# Patient Record
Sex: MALE | Race: WHITE | Employment: FULL TIME | URBAN - METROPOLITAN AREA
[De-identification: names, ages, dates, MRNs, and addresses within clinical notes are randomized per-mention and may not be internally consistent; named-entity substitution may affect disease eponyms.]

---

## 2017-02-01 ENCOUNTER — VIRTUAL VISIT (OUTPATIENT)
Dept: FAMILY MEDICINE | Facility: OTHER | Age: 42
End: 2017-02-01

## 2017-02-01 NOTE — PROGRESS NOTES
"Date:   Clinician: Maritza Rapp  Clinician NPI: [id number]  Patient: Rickey Butt  Patient : 1975  Patient Address: [address] [place] [address]  Patient Phone: [phone]  Visit Protocol: URI  Patient Summary:  Rickey is a 41 year old ( : 1975 ) male who initiated a Zip for a presumed sinus infection. When asked the question \"Do you have a [place] primary care physician?\", Rickey responded \"Yes\".     His symptoms started gradually 48 hours ago and consist of rhinitis, chills, nasal congestion, itchy eyes, loss of appetite, post-nasal drainage, cough, myalgias, and malaise.   He denies dysphagia, nausea, vomiting, fever, sore throat, hoarse voice, ear pain, dyspnea, and chest pain. He denies a history of facial surgery.   His profuse nasal secretions are yellow. His moderate facial pain or pressure does not feel worse when bending or leaning forward and is located on both sides of his head. The facial pain or pressure started after the onset of other URI symptoms.  He has teeth pain and is confident the tooth pain is not from a cavity, recent dental work or other mouth problems. Rickey has a moderate headache. The headache did not start before his other symptoms and is located on both sides of his head.   In the past year Rickey has been diagnosed with one (1) episodes of sinusitis. When asked to feel his neck he reported enlarged lymph nodes. Rickey noted that the enlarged neck lymph nodes were first noticed when prompted to check for lymphadenopathy. He denies axillary lymphadenopathy.   His mild (a few coughs/hr) productive cough is NOT more bothersome at night. He believes the cough is caused by post-nasal drainage. His cough produces yellow sputum.   He denies rigors.   Rickey denies having COPD or other chronic lung disease.   Pulse: Not measured beats in 10 seconds.    Weight (in lbs): 200   Rickey does not smoke or use smokeless tobacco.   " MEDICATIONS:  Sertraline (Zoloft)  , ALLERGIES:  NKDA   Clinician Response:  Dear Rickey,  Based on the information you have provided, you likely have a viral upper respiratory infection, otherwise known as a 'cold'.   I am prescribing fluticasone propionate nasal (Flonase) 50 mcg/spray. Take one or two inhalations in each nostril one time a day. There are no refills with this prescription.   Unless your are allergic to the over-the-counter medication(s) below, I recommend using:   Ibuprofen. Please follow the instructions on the package. This is an over-the-counter medication that does not require a prescription.   Drink plenty of liquids, especially water and take time to rest your body. This may mean taking a nap or going to bed earlier. Your body is fighting an infection and liquids and rest will improve the pace of recovery. Remember to regularly wash your hands and avoid close contact with others to prevent spreading your infection.   Diagnosis: Viral URI  Diagnosis ICD: J06.9  Prescription: fluticasone propionate (Flonase) 50mcg nasal spray 16 gm, 30 days supply. Take one or two inhalations in each nostril one time a day. Refills: 0, Refill as needed: no, Allow substitutions: yes  Prescription Sent At: February 01 15:07:39, 2017  Pharmacy: [id number] - [phone] - [address] [place] [address]

## 2017-05-14 ENCOUNTER — OFFICE VISIT (OUTPATIENT)
Dept: URGENT CARE | Facility: URGENT CARE | Age: 42
End: 2017-05-14
Payer: COMMERCIAL

## 2017-05-14 VITALS
TEMPERATURE: 98.5 F | HEART RATE: 66 BPM | SYSTOLIC BLOOD PRESSURE: 110 MMHG | OXYGEN SATURATION: 97 % | DIASTOLIC BLOOD PRESSURE: 80 MMHG

## 2017-05-14 DIAGNOSIS — R07.0 THROAT PAIN: Primary | ICD-10-CM

## 2017-05-14 DIAGNOSIS — J30.2 SEASONAL ALLERGIC RHINITIS, UNSPECIFIED ALLERGIC RHINITIS TRIGGER: ICD-10-CM

## 2017-05-14 LAB
DEPRECATED S PYO AG THROAT QL EIA: NORMAL
MICRO REPORT STATUS: NORMAL
SPECIMEN SOURCE: NORMAL

## 2017-05-14 PROCEDURE — 87880 STREP A ASSAY W/OPTIC: CPT | Performed by: FAMILY MEDICINE

## 2017-05-14 PROCEDURE — 99213 OFFICE O/P EST LOW 20 MIN: CPT | Performed by: FAMILY MEDICINE

## 2017-05-14 PROCEDURE — 87081 CULTURE SCREEN ONLY: CPT | Performed by: FAMILY MEDICINE

## 2017-05-14 RX ORDER — CETIRIZINE HYDROCHLORIDE 10 MG/1
10 TABLET ORAL PRN
COMMUNITY
End: 2020-05-20

## 2017-05-14 RX ORDER — FLUTICASONE PROPIONATE 50 MCG
1-2 SPRAY, SUSPENSION (ML) NASAL DAILY
Qty: 1 BOTTLE | Refills: 0 | Status: SHIPPED | OUTPATIENT
Start: 2017-05-14

## 2017-05-14 NOTE — PROGRESS NOTES
SUBJECTIVE:   Rickey Butt is a 41 year old male presenting with a chief complaint of strep exposure and sore throat:  Symptoms started 1 week ago and  include: sore throat in the mornings and some post nasal drainage.  Has seasonal allergies, taking zyrtec.  Multiple family members with strep.  Course of illness is: same.    Treatment measures tried:  OTC meds.  Predisposing factors include:  Non smoker    ROS:  5-Point Review of Systems Negative-- Except as stated above.    OBJECTIVE  /80 (BP Location: Right arm, Patient Position: Chair, Cuff Size: Adult Large)  Pulse 66  Temp 98.5  F (36.9  C) (Oral)  SpO2 97%  GENERAL:  Awake, alert and interactive. No acute distress.  HEENT:   NC/AT, EOMI, clear conjunctiva.  Nose clear.  Oropharynx moist and clear.  TM's and EAC's benign.  NECK: supple and free of adenopathy  CHEST:  Lungs are clear, no rhonchi, wheezing or rales. Normal symmetric air entry throughout both lung fields.   HEART:  S1 and S2 normal, no murmurs, clicks, gallops or rubs. Regular rate and rhythm.    Results for orders placed or performed in visit on 05/14/17   Rapid strep screen   Result Value Ref Range    Specimen Description Throat     Rapid Strep A Screen       NEGATIVE: No Group A streptococcal antigen detected by immunoassay, await   culture report.      Micro Report Status FINAL 05/14/2017        ASSESSMENT/PLAN    ICD-10-CM    1. Throat pain R07.0 Rapid strep screen     Beta strep group A culture   2. Seasonal allergic rhinitis, unspecified allergic rhinitis trigger J30.2 fluticasone (FLONASE) 50 MCG/ACT spray      We discussed the expected course and symptomatic cares in detail, including return to care if symptoms not improving as expected, do not resolve completely, or if any new or worsening symptoms develop.

## 2017-05-15 LAB
BACTERIA SPEC CULT: NORMAL
MICRO REPORT STATUS: NORMAL
SPECIMEN SOURCE: NORMAL

## 2018-05-04 ENCOUNTER — OFFICE VISIT (OUTPATIENT)
Dept: FAMILY MEDICINE | Facility: CLINIC | Age: 43
End: 2018-05-04
Payer: COMMERCIAL

## 2018-05-04 VITALS
WEIGHT: 210 LBS | RESPIRATION RATE: 14 BRPM | HEART RATE: 73 BPM | BODY MASS INDEX: 27.83 KG/M2 | HEIGHT: 73 IN | SYSTOLIC BLOOD PRESSURE: 122 MMHG | DIASTOLIC BLOOD PRESSURE: 80 MMHG | TEMPERATURE: 98.2 F

## 2018-05-04 DIAGNOSIS — F41.1 GENERALIZED ANXIETY DISORDER: ICD-10-CM

## 2018-05-04 DIAGNOSIS — F32.0 MILD MAJOR DEPRESSION (H): ICD-10-CM

## 2018-05-04 DIAGNOSIS — J30.2 ACUTE SEASONAL ALLERGIC RHINITIS, UNSPECIFIED TRIGGER: Primary | ICD-10-CM

## 2018-05-04 PROCEDURE — 99214 OFFICE O/P EST MOD 30 MIN: CPT | Performed by: PHYSICIAN ASSISTANT

## 2018-05-04 RX ORDER — AZELASTINE 1 MG/ML
1 SPRAY, METERED NASAL 2 TIMES DAILY
Qty: 1 BOTTLE | Refills: 3 | Status: SHIPPED | OUTPATIENT
Start: 2018-05-04 | End: 2020-05-20

## 2018-05-04 ASSESSMENT — PATIENT HEALTH QUESTIONNAIRE - PHQ9: 5. POOR APPETITE OR OVEREATING: NOT AT ALL

## 2018-05-04 ASSESSMENT — ANXIETY QUESTIONNAIRES
5. BEING SO RESTLESS THAT IT IS HARD TO SIT STILL: NOT AT ALL
2. NOT BEING ABLE TO STOP OR CONTROL WORRYING: NOT AT ALL
IF YOU CHECKED OFF ANY PROBLEMS ON THIS QUESTIONNAIRE, HOW DIFFICULT HAVE THESE PROBLEMS MADE IT FOR YOU TO DO YOUR WORK, TAKE CARE OF THINGS AT HOME, OR GET ALONG WITH OTHER PEOPLE: NOT DIFFICULT AT ALL
GAD7 TOTAL SCORE: 1
3. WORRYING TOO MUCH ABOUT DIFFERENT THINGS: NOT AT ALL
1. FEELING NERVOUS, ANXIOUS, OR ON EDGE: NOT AT ALL
6. BECOMING EASILY ANNOYED OR IRRITABLE: SEVERAL DAYS
7. FEELING AFRAID AS IF SOMETHING AWFUL MIGHT HAPPEN: NOT AT ALL

## 2018-05-04 NOTE — PROGRESS NOTES
SUBJECTIVE:   Rickey Butt is a 42 year old male who presents to clinic today for the following health issues:      ALLERGIES     Onset: x 1 week    Description:   Nasal congestion: YES  Sneezing: YES  Red, itchy eyes: YES    Progression of Symptoms:  same    Accompanying Signs & Symptoms:  Cough: no   Wheezing: no   Rash: no   Sinus/facial pain: YES   History:   Is it seasonal: in the spring   History of Asthma: no   Has allergy testing been done: no     Precipitating factors:   None    Alleviating factors:  None       Therapies Tried and outcome: flonase, zyrtec      Depression and Anxiety Follow-Up    Status since last visit: No change    Other associated symptoms:None    Complicating factors:     Significant life event: No     Current substance abuse: None    PHQ-9 10/19/2015 12/7/2015   Total Score 2 3   Q9: Suicide Ideation Not at all Not at all     JENIFER-7 SCORE 11/24/2014 10/19/2015 12/7/2015   Total Score 3 - -   Total Score - - -   Total Score - 6 3     In the past two weeks have you had thoughts of suicide or self-harm?  No.    Do you have concerns about your personal safety or the safety of others?   No  PHQ-9  English  PHQ-9   Any Language  JENIFER-7  Suicide Assessment Five-step Evaluation and Treatment (SAFE-T)      Was managed by psychiatrist. They retired. Has ~4 months of refills left.     Problem list and histories reviewed & adjusted, as indicated.  Additional history: as documented    Patient Active Problem List   Diagnosis     CARDIOVASCULAR SCREENING; LDL GOAL LESS THAN 160     Family hx of colon cancer     Family hx of melanoma     Mild major depression (H)     Generalized anxiety disorder     Seasonal allergic rhinitis     Past Surgical History:   Procedure Laterality Date     APPENDECTOMY  2007     HERNIA REPAIR         Social History   Substance Use Topics     Smoking status: Never Smoker     Smokeless tobacco: Never Used     Alcohol use Yes      Comment: one bottle beer per week      "Family History   Problem Relation Age of Onset     CANCER Mother      melanoma         Current Outpatient Prescriptions   Medication Sig Dispense Refill     azelastine (ASTELIN) 0.1 % spray Spray 1 spray into both nostrils 2 times daily 1 Bottle 3     cetirizine (ZYRTEC) 10 MG tablet Take 10 mg by mouth daily       fluticasone (FLONASE) 50 MCG/ACT spray Spray 1-2 sprays into both nostrils daily 1 Bottle 0     sertraline (ZOLOFT) 25 MG tablet Take 25 mg by mouth daily Patient taking 1.5 tabs daily 30 tablet 0     No Known Allergies  Recent Labs   Lab Test  10/19/15   0804  07/18/13   0832   LDL   --   89   HDL   --   38*   TRIG   --   92   ALT   --   35   CR   --   0.86   GFRESTIMATED   --   >90   GFRESTBLACK   --   >90   POTASSIUM   --   4.2   TSH  0.86  0.63        Reviewed and updated as needed this visit by clinical staff  Tobacco  Allergies  Meds  Problems  Med Hx  Surg Hx  Fam Hx  Soc Hx        Reviewed and updated as needed this visit by Provider  Allergies  Meds  Problems         ROS:  Constitutional, HEENT, psych, cardiovascular, pulmonary, gi and gu systems are negative, except as otherwise noted.    OBJECTIVE:     /80 (BP Location: Right arm, Patient Position: Chair, Cuff Size: Adult Large)  Pulse 73  Temp 98.2  F (36.8  C) (Oral)  Resp 14  Ht 6' 1\" (1.854 m)  Wt 210 lb (95.3 kg)  BMI 27.71 kg/m2  Body mass index is 27.71 kg/(m^2).  GENERAL: healthy, alert and no distress  EYES: Eyes grossly normal to inspection, PERRL and conjunctivae and sclerae normal  HENT: normal cephalic/atraumatic, both ears: clear effusion, nasal mucosa edematous , oropharynx clear, oral mucous membranes moist and sinuses: maxillary tenderness on bilaterally  NECK: no adenopathy, no asymmetry, masses, or scars and thyroid normal to palpation  RESP: lungs clear to auscultation - no rales, rhonchi or wheezes  CV: regular rates and rhythm, normal S1 S2, no S3 or S4 and no murmur, click or rub  PSYCH: mentation " appears normal, affect normal/bright    Diagnostic Test Results:  none     ASSESSMENT/PLAN:     (J30.2) Acute seasonal allergic rhinitis, unspecified trigger  (primary encounter diagnosis)  Comment: history of this. Not managed with past treatments. Will add astelin to regimen. If no improvement in ~5 days, patient to call and singulair to be added.   Plan: azelastine (ASTELIN) 0.1 % spray        -Medication use and side effects discussed with the patient. Patient is in complete understanding and agreement with plan.       (F32.0) Mild major depression (H)  Comment: stable. Was followed by psych. No longer established. Given stability, I am comfortable continuing management. Does not yet need refill, but will refill in 4 months and see patient yearly.   Plan:     (F41.1) Generalized anxiety disorder  Comment: as above   Plan:     Follow up: as above     Eduardo Mckinnon PA-C  [facility]

## 2018-05-04 NOTE — MR AVS SNAPSHOT
"              After Visit Summary   5/4/2018    Rickey Butt    MRN: [id number]           Patient Information     Date Of Birth          1975        Visit Information        Provider Department      5/4/2018 7:00 AM Eduardo Mckinnon PA-C [facility]        Today's Diagnoses     Mild major depression (H)    -  1    Generalized anxiety disorder        Acute seasonal allergic rhinitis, unspecified trigger           Follow-ups after your visit        Who to contact     If you have questions or need follow up information about today's clinic visit or your schedule please contact [facility] directly at [phone].  Normal or non-critical lab and imaging results will be communicated to you by MyChart, letter or phone within 4 business days after the clinic has received the results. If you do not hear from us within 7 days, please contact the clinic through [employer] or phone. If you have a critical or abnormal lab result, we will notify you by phone as soon as possible.  Submit refill requests through [employer] or call your pharmacy and they will forward the refill request to us. Please allow 3 business days for your refill to be completed.          Additional Information About Your Visit        MyChart Information     [employer] gives you secure access to your electronic health record. If you see a primary care provider, you can also send messages to your care team and make appointments. If you have questions, please call your primary care clinic.  If you do not have a primary care provider, please call [phone] and they will assist you.        Care EveryWhere ID     This is your Care EveryWhere ID. This could be used by other organizations to access your [facility] medical records  [id number]        Your Vitals Were     Pulse Temperature Respirations Height BMI (Body Mass Index)       73 98.2  F (36.8  C) (Oral) 14 6' 1\" (1.854 m) 27.71 kg/m2        Blood Pressure from " Last 3 Encounters:   05/04/18 122/80   05/14/17 110/80   12/26/16 128/70    Weight from Last 3 Encounters:   05/04/18 210 lb (95.3 kg)   12/26/16 203 lb (92.1 kg)   03/31/16 200 lb (90.7 kg)              We Performed the Following     DEPRESSION ACTION PLAN (DAP)          Today's Medication Changes          These changes are accurate as of 5/4/18  7:20 AM.  If you have any questions, ask your nurse or doctor.               Start taking these medicines.        Dose/Directions    azelastine 0.1 % spray   Commonly known as:  ASTELIN   Used for:  Acute seasonal allergic rhinitis, unspecified trigger   Started by:  Eduardo Mckinnon PA-C        Dose:  1 spray   Spray 1 spray into both nostrils 2 times daily   Quantity:  1 Bottle   Refills:  3         These medicines have changed or have updated prescriptions.        Dose/Directions    fluticasone 50 MCG/ACT spray   Commonly known as:  FLONASE   This may have changed:  Another medication with the same name was removed. Continue taking this medication, and follow the directions you see here.   Used for:  Seasonal allergic rhinitis, unspecified allergic rhinitis trigger   Changed by:  Eduardo Mckinnon PA-C        Dose:  1-2 spray   Spray 1-2 sprays into both nostrils daily   Quantity:  1 Bottle   Refills:  0         Stop taking these medicines if you haven't already. Please contact your care team if you have questions.     albuterol 108 (90 Base) MCG/ACT Inhaler   Commonly known as:  PROAIR HFA/PROVENTIL HFA/VENTOLIN HFA   Stopped by:  Eduardo Mckinnon PA-C                Where to get your medicines      These medications were sent to [facility] [address] [place], [place] - [address]  [address], [facility] [address]     Phone:  [phone]     azelastine 0.1 % spray                Primary Care Provider Fax #    Physician No Ref-Primary [phone]       No address on file        Equal Access  to Services     PAZ ARAGNO : Cooper Ruano, wadanika barron, qamaris valeraalalexis bain. [facility] [phone].    ATENCIÓN: Si lorela vannesa, tiene a etienne disposición servicios gratuitos de asistencia lingüística. Llame al [phone].    We comply with applicable federal civil rights laws and [place] laws. We do not discriminate on the basis of race, color, national origin, age, disability, sex, sexual orientation, or gender identity.            Thank you!     Thank you for choosing [facility]  for your care. Our goal is always to provide you with excellent care. Hearing back from our patients is one way we can continue to improve our services. Please take a few minutes to complete the written survey that you may receive in the mail after your visit with us. Thank you!             Your Updated Medication List - Protect others around you: Learn how to safely use, store and throw away your medicines at www.disposemymeds.org.          This list is accurate as of 5/4/18  7:20 AM.  Always use your most recent med list.                   Brand Name Dispense Instructions for use Diagnosis    azelastine 0.1 % spray    ASTELIN    1 Bottle    Spray 1 spray into both nostrils 2 times daily    Acute seasonal allergic rhinitis, unspecified trigger       cetirizine 10 MG tablet    zyrTEC     Take 10 mg by mouth daily        fluticasone 50 MCG/ACT spray    FLONASE    1 Bottle    Spray 1-2 sprays into both nostrils daily    Seasonal allergic rhinitis, unspecified allergic rhinitis trigger       sertraline 25 MG tablet    ZOLOFT    30 tablet    Take 25 mg by mouth daily Patient taking 1.5 tabs daily

## 2018-05-04 NOTE — LETTER
My Depression Action Plan  Name: Rickey Butt   Date of Birth 1975  Date: 5/4/2018    My doctor: No Ref-Primary, Physician   My clinic: [address] [address] [phone]          GREEN    ZONE   Good Control    What it looks like:     Things are going generally well. You have normal up s and down s. You may even feel depressed from time to time, but bad moods usually last less than a day.   What you need to do:  1. Continue to care for yourself (see self care plan)  2. Check your depression survival kit and update it as needed  3. Follow your physician s recommendations including any medication.  4. Do not stop taking medication unless you consult with your physician first.           YELLOW         ZONE Getting Worse    What it looks like:     Depression is starting to interfere with your life.     It may be hard to get out of bed; you may be starting to isolate yourself from others.    Symptoms of depression are starting to last most all day and this has happened for several days.     You may have suicidal thoughts but they are not constant.   What you need to do:     1. Call your care team, your response to treatment will improve if you keep your care team informed of your progress. Yellow periods are signs an adjustment may need to be made.     2. Continue your self-care, even if you have to fake it!    3. Talk to someone in your support network    4. Open up your depression survival kit           RED    ZONE Medical Alert - Get Help    What it looks like:     Depression is seriously interfering with your life.     You may experience these or other symptoms: You can t get out of bed most days, can t work or engage in other necessary activities, you have trouble taking care of basic hygiene, or basic responsibilities, thoughts of suicide or death that will not go away, self-injurious behavior.     What you need to do:  1. Call  your care team and request a same-day appointment. If they are not available (weekends or after hours) call your local crisis line, emergency room or 911.            Depression Self Care Plan / Survival Kit    Self-Care for Depression  Here s the deal. Your body and mind are really not as separate as most people think.  What you do and think affects how you feel and how you feel influences what you do and think. This means if you do things that people who feel good do, it will help you feel better.  Sometimes this is all it takes.  There is also a place for medication and therapy depending on how severe your depression is, so be sure to consult with your medical provider and/ or Behavioral Health Consultant if your symptoms are worsening or not improving.     In order to better manage my stress, I will:    Exercise  Get some form of exercise, every day. This will help reduce pain and release endorphins, the  feel good  chemicals in your brain. This is almost as good as taking antidepressants!  This is not the same as joining a gym and then never going! (they count on that by the way ) It can be as simple as just going for a walk or doing some gardening, anything that will get you moving.      Hygiene   Maintain good hygiene (Get out of bed in the morning, Make your bed, Brush your teeth, Take a shower, and Get dressed like you were going to work, even if you are unemployed).  If your clothes don't fit try to get ones that do.    Diet  I will strive to eat foods that are good for me, drink plenty of water, and avoid excessive sugar, caffeine, alcohol, and other mood-altering substances.  Some foods that are helpful in depression are: complex carbohydrates, B vitamins, flaxseed, fish or fish oil, fresh fruits and vegetables.    Psychotherapy  I agree to participate in Individual Therapy (if recommended).    Medication  If prescribed medications, I agree to take them.  Missing doses can result in serious side effects.   I understand that drinking alcohol, or other illicit drug use, may cause potential side effects.  I will not stop my medication abruptly without first discussing it with my provider.    Staying Connected With Others  I will stay in touch with my friends, family members, and my primary care provider/team.    Use your imagination  Be creative.  We all have a creative side; it doesn t matter if it s oil painting, sand castles, or mud pies! This will also kick up the endorphins.    Witness Beauty  (AKA stop and smell the roses) Take a look outside, even in mid-winter. Notice colors, textures. Watch the squirrels and birds.     Service to others  Be of service to others.  There is always someone else in need.  By helping others we can  get out of ourselves  and remember the really important things.  This also provides opportunities for practicing all the other parts of the program.    Humor  Laugh and be silly!  Adjust your TV habits for less news and crime-drama and more comedy.    Control your stress  Try breathing deep, massage therapy, biofeedback, and meditation. Find time to relax each day.     My support system    Clinic Contact:  Phone number:    Contact 1:  Phone number:    Contact 2:  Phone number:    Restorationist/:  Phone number:    Therapist:  Phone number:    [facility] crisis center:    Phone number:    Other community support:  Phone number:

## 2018-05-05 ASSESSMENT — PATIENT HEALTH QUESTIONNAIRE - PHQ9: SUM OF ALL RESPONSES TO PHQ QUESTIONS 1-9: 2

## 2018-05-05 ASSESSMENT — ANXIETY QUESTIONNAIRES: GAD7 TOTAL SCORE: 1

## 2018-05-06 ENCOUNTER — MYC MEDICAL ADVICE (OUTPATIENT)
Dept: FAMILY MEDICINE | Facility: CLINIC | Age: 43
End: 2018-05-06

## 2018-05-06 DIAGNOSIS — J30.1 ACUTE SEASONAL ALLERGIC RHINITIS DUE TO POLLEN: Primary | ICD-10-CM

## 2018-05-07 RX ORDER — MONTELUKAST SODIUM 10 MG/1
10 TABLET ORAL AT BEDTIME
Qty: 90 TABLET | Refills: 1 | Status: SHIPPED | OUTPATIENT
Start: 2018-05-07 | End: 2020-05-20

## 2018-05-07 NOTE — TELEPHONE ENCOUNTER
ZB:    Please see below. Per LOV note on 5/4/18:    (J30.2) Acute seasonal allergic rhinitis, unspecified trigger  (primary encounter diagnosis)  Comment: history of this. Not managed with past treatments. Will add astelin to regimen. If no improvement in ~5 days, patient to call and singulair to be added.   Plan: azelastine (ASTELIN) 0.1 % spray        -Medication use and side effects discussed with the patient. Patient is in complete understanding and agreement with plan.      Brianne Garrison RN -- [facility]

## 2018-08-30 DIAGNOSIS — F41.1 GENERALIZED ANXIETY DISORDER: ICD-10-CM

## 2018-08-30 DIAGNOSIS — F32.0 MILD MAJOR DEPRESSION (H): Primary | ICD-10-CM

## 2018-08-30 NOTE — TELEPHONE ENCOUNTER
"Requested Prescriptions   Pending Prescriptions Disp Refills     sertraline (ZOLOFT) 25 MG tablet 30 tablet 0     Sig: Take 1 tablet (25 mg) by mouth daily Patient taking 1.5 tabs daily    SSRIs Protocol Passed    8/30/2018  7:20 AM       Passed - Recent (12 mo) or future (30 days) visit within the authorizing provider's specialty    Patient had office visit in the last 12 months or has a visit in the next 30 days with authorizing provider or within the authorizing provider's specialty.  See \"Patient Info\" tab in inbasket, or \"Choose Columns\" in Meds & Orders section of the refill encounter.           Passed - Patient is age 18 or older        Last Written Prescription Date:  12/26/2016  Last Fill Quantity: 30,  # refills: 0   Last office visit: 5/4/2018 with prescribing provider:  César Mckinnon   Future Office Visit:      "

## 2018-09-01 NOTE — TELEPHONE ENCOUNTER
OV 5-4-18 (F32.0) Mild major depression (H)  Comment: stable. Was followed by psych. No longer established. Given stability, I am comfortable continuing management.   Does not yet need refill, but will refill in 4 months and see patient yearly.      PHQ-9 SCORE 10/19/2015 12/7/2015 5/4/2018   Total Score - - -   Total Score MyChart - - -   Total Score 2 3 2     JENIFER-7 SCORE 10/19/2015 12/7/2015 5/4/2018   Total Score - - -   Total Score - - -   Total Score 6 3 1       Routing refill request to provider for review/approval because:  New provider prescribing medication, rx states he takes 1.5 tabs (37.5 mg) do you want dose changed to that?    Consuelo Juarez RN, BS  Clinical Nurse Triage.

## 2018-09-02 RX ORDER — SERTRALINE HYDROCHLORIDE 25 MG/1
TABLET, FILM COATED ORAL
Qty: 135 TABLET | Refills: 0 | OUTPATIENT
Start: 2018-09-02

## 2018-09-03 RX ORDER — SERTRALINE HYDROCHLORIDE 25 MG/1
25 TABLET, FILM COATED ORAL DAILY
Qty: 135 TABLET | Refills: 1 | Status: SHIPPED | OUTPATIENT
Start: 2018-09-03 | End: 2019-02-27

## 2018-09-09 ENCOUNTER — OFFICE VISIT (OUTPATIENT)
Dept: URGENT CARE | Facility: URGENT CARE | Age: 43
End: 2018-09-09
Payer: COMMERCIAL

## 2018-09-09 VITALS
OXYGEN SATURATION: 98 % | HEART RATE: 68 BPM | TEMPERATURE: 97.2 F | DIASTOLIC BLOOD PRESSURE: 79 MMHG | SYSTOLIC BLOOD PRESSURE: 122 MMHG

## 2018-09-09 DIAGNOSIS — I49.9 IRREGULAR HEARTBEAT: Primary | ICD-10-CM

## 2018-09-09 PROCEDURE — 36415 COLL VENOUS BLD VENIPUNCTURE: CPT | Performed by: FAMILY MEDICINE

## 2018-09-09 PROCEDURE — 80048 BASIC METABOLIC PNL TOTAL CA: CPT | Performed by: FAMILY MEDICINE

## 2018-09-09 PROCEDURE — 99214 OFFICE O/P EST MOD 30 MIN: CPT | Performed by: FAMILY MEDICINE

## 2018-09-09 PROCEDURE — 93000 ELECTROCARDIOGRAM COMPLETE: CPT | Performed by: FAMILY MEDICINE

## 2018-09-09 ASSESSMENT — ENCOUNTER SYMPTOMS: PALPITATIONS: 1

## 2018-09-09 NOTE — MR AVS SNAPSHOT
After Visit Summary   9/9/2018    Rickey Butt    MRN: [id number]           Patient Information     Date Of Birth          1975        Visit Information        Provider Department      9/9/2018 4:20 PM Coy Echevarria MD [facility]        Today's Diagnoses     Irregular heartbeat    -  1       Follow-ups after your visit        Who to contact     If you have questions or need follow up information about today's clinic visit or your schedule please contact [facility] URG[facility] directly at [phone].  Normal or non-critical lab and imaging results will be communicated to you by [employer], letter or phone within 4 business days after the clinic has received the results. If you do not hear from us within 7 days, please contact the clinic through [employer] or phone. If you have a critical or abnormal lab result, we will notify you by phone as soon as possible.  Submit refill requests through [employer] or call your pharmacy and they will forward the refill request to us. Please allow 3 business days for your refill to be completed.          Additional Information About Your Visit        MyChart Information     [employer] gives you secure access to your electronic health record. If you see a primary care provider, you can also send messages to your care team and make appointments. If you have questions, please call your primary care clinic.  If you do not have a primary care provider, please call [phone] and they will assist you.        Care EveryWhere ID     This is your Care EveryWhere ID. This could be used by other organizations to access your [place] medical records  [id number]        Your Vitals Were     Pulse Temperature Pulse Oximetry             68 97.2  F (36.2  C) (Oral) 98%          Blood Pressure from Last 3 Encounters:   09/09/18 122/79   05/04/18 122/80   05/14/17 110/80    Weight from Last 3 Encounters:   05/04/18 210 lb (95.3 kg)   12/26/16 203 lb  (92.1 kg)   03/31/16 200 lb (90.7 kg)              We Performed the Following     Basic metabolic panel     EKG 12-lead complete w/read - Clinics        Primary Care Provider Office Phone # Fax #    Eduardo Dwain Mckinnon PA-C [phone] [address] [facility] [address]        Equal Access to Services     [facility]: Hadii aad ku hadasho Soomaali, waaxda luqadaha, qaybta kaalmada adeegyada, waxay idiin hayaan adeeg kharash la'aan . So [facility] [phone].    ATENCIÓN: Si habla español, tiene a etienne disposición servicios gratuitos de asistencia lingüística. Llame al [phone].    We comply with applicable federal civil rights laws and [place] laws. We do not discriminate on the basis of race, color, national origin, age, disability, sex, sexual orientation, or gender identity.            Thank you!     Thank you for choosing [facility] URGENT [facility]ARE  for your care. Our goal is always to provide you with excellent care. Hearing back from our patients is one way we can continue to improve our services. Please take a few minutes to complete the written survey that you may receive in the mail after your visit with us. Thank you!             Your Updated Medication List - Protect others around you: Learn how to safely use, store and throw away your medicines at www.disposemymeds.org.          This list is accurate as of 9/9/18  5:48 PM.  Always use your most recent med list.                   Brand Name Dispense Instructions for use Diagnosis    azelastine 0.1 % nasal spray    ASTELIN    1 Bottle    Spray 1 spray into both nostrils 2 times daily    Acute seasonal allergic rhinitis, unspecified trigger       cetirizine 10 MG tablet    zyrTEC     Take 10 mg by mouth daily        fluticasone 50 MCG/ACT spray    FLONASE    1 Bottle    Spray 1-2 sprays into both nostrils daily    Seasonal allergic rhinitis, unspecified allergic rhinitis trigger       montelukast 10 MG tablet    SINGULAIR     90 tablet    Take 1 tablet (10 mg) by mouth At Bedtime    Acute seasonal allergic rhinitis due to pollen       sertraline 25 MG tablet    ZOLOFT    135 tablet    Take 1 tablet (25 mg) by mouth daily Patient taking 1.5 tabs daily    Mild major depression (H), Generalized anxiety disorder

## 2018-09-09 NOTE — PROGRESS NOTES
SUBJECTIVE:   Rickey Butt is a 43 year old male presenting with a chief complaint of   Chief Complaint   Patient presents with     Urgent Care     Irregular Heart Beat     Feels heart is skipping started yesterday- Denies numbness or tingling on arms, no Cx pain   Acute onset of feeling of a skipped beat or an extra beat over the last several days.  Thinks he may be a little bit dehydrated  Has been walking his dog with no problems associated with increase in activity    He first noticed this when he was at rest.  It is not woken up at night      He does not have a family history of cardiovascular disease that he knows of  No personal history of cardiovascular disease      Not taking any medications other than antiallergy medications and has not been on those at this time.    He is an established patient of [employer].    Cardiac Event    Symptom Onset: 2 day(s) ago.  Timing of illness: sudden onset.  Current and Associated symptoms: irregular heart beats.  Character: dull.  Severity mild.  Location: left chest.  Radiation: none.  Associated Symptoms: none.  Exacerbated by: nothing.  Relieved by: nothing.  Cardiac risk factors: known cardiac disease.        Review of Systems   Cardiovascular: Positive for palpitations.   All other systems reviewed and are negative.      Past Medical History:   Diagnosis Date     CARDIOVASCULAR SCREENING; LDL GOAL LESS THAN 160 7/18/2013     Family hx of colon cancer 7/18/2013     Family hx of melanoma 7/18/2013     Generalised anxiety disorder 7/18/2013     Mild major depression (H) 7/18/2013     Family History   Problem Relation Age of Onset     Cancer Mother      melanoma     Current Outpatient Prescriptions   Medication Sig Dispense Refill     sertraline (ZOLOFT) 25 MG tablet Take 1 tablet (25 mg) by mouth daily Patient taking 1.5 tabs daily 135 tablet 1     azelastine (ASTELIN) 0.1 % spray Spray 1 spray into both nostrils 2 times daily 1 Bottle 3     cetirizine (ZYRTEC) 10  MG tablet Take 10 mg by mouth daily       fluticasone (FLONASE) 50 MCG/ACT spray Spray 1-2 sprays into both nostrils daily 1 Bottle 0     montelukast (SINGULAIR) 10 MG tablet Take 1 tablet (10 mg) by mouth At Bedtime 90 tablet 1     Social History   Substance Use Topics     Smoking status: Never Smoker     Smokeless tobacco: Never Used     Alcohol use Yes      Comment: one bottle beer per week       OBJECTIVE  /79 (BP Location: Right arm, Patient Position: Chair, Cuff Size: Adult Regular)  Pulse 68  Temp 97.2  F (36.2  C) (Oral)  SpO2 98%    Physical Exam   Constitutional: He is oriented to person, place, and time. He appears well-developed and well-nourished.   HENT:   Head: Normocephalic.   Eyes: EOM are normal. Pupils are equal, round, and reactive to light.   Neck: Normal range of motion. Neck supple.   Cardiovascular: Normal rate and regular rhythm.    Pulmonary/Chest: Effort normal.   Abdominal: Soft.   Musculoskeletal: Normal range of motion.   Neurological: He is alert and oriented to person, place, and time.   Skin: Skin is warm.   Psychiatric: He has a normal mood and affect.   Nursing note and vitals reviewed.      Labs:  No results found for this or any previous visit (from the past 24 hour(s)).    X-Ray was not done.  EKG EKG showed a sinus rhythm  ; Occasional ectopy that appeared to be ventricular.  Normal rate    ASSESSMENT:      ICD-10-CM    1. Irregular heartbeat I49.9 EKG 12-lead complete w/read - Clinics     Basic metabolic panel        Medical Decision Making:    Differential Diagnosis:  Ectopy, sinus rhythm, anxiety, allergy     Serious Comorbid Conditions:  Adult:  None    PLAN:    Cardiac: We will be checking his BMP.  I am concerned that he may have been dehydrated I am going to suggest that he use 32 ounces of Gatorade on a daily basis over the next 2-3 days .  In addition there is some ectopy that he has not noticed before I do not think that it serious and I think overall his  cardiovascular health appears normal however    Having said that I would probably still have him sent to cardiology either for consultation or for a stress echo I did suggest that he call his.    Primary care and my note would be on the chart with recommendations    Followup:    As above    There are no Patient Instructions on file for this visit.

## 2018-09-10 LAB
ANION GAP SERPL CALCULATED.3IONS-SCNC: 6 MMOL/L (ref 3–14)
BUN SERPL-MCNC: 12 MG/DL (ref 7–30)
CALCIUM SERPL-MCNC: 8.8 MG/DL (ref 8.5–10.1)
CHLORIDE SERPL-SCNC: 112 MMOL/L (ref 94–109)
CO2 SERPL-SCNC: 25 MMOL/L (ref 20–32)
CREAT SERPL-MCNC: 0.91 MG/DL (ref 0.66–1.25)
GFR SERPL CREATININE-BSD FRML MDRD: >90 ML/MIN/1.7M2
GLUCOSE SERPL-MCNC: 90 MG/DL (ref 70–99)
POTASSIUM SERPL-SCNC: 4.1 MMOL/L (ref 3.4–5.3)
SODIUM SERPL-SCNC: 143 MMOL/L (ref 133–144)

## 2018-09-11 ENCOUNTER — OFFICE VISIT (OUTPATIENT)
Dept: CARDIOLOGY | Facility: CLINIC | Age: 43
End: 2018-09-11
Payer: COMMERCIAL

## 2018-09-11 VITALS
WEIGHT: 207.6 LBS | HEART RATE: 59 BPM | BODY MASS INDEX: 27.51 KG/M2 | DIASTOLIC BLOOD PRESSURE: 87 MMHG | HEIGHT: 73 IN | SYSTOLIC BLOOD PRESSURE: 133 MMHG

## 2018-09-11 DIAGNOSIS — I49.3 PVC'S (PREMATURE VENTRICULAR CONTRACTIONS): ICD-10-CM

## 2018-09-11 DIAGNOSIS — R00.2 HEART PALPITATIONS: Primary | ICD-10-CM

## 2018-09-11 LAB
MAGNESIUM SERPL-MCNC: 2.4 MG/DL (ref 1.6–2.3)
TSH SERPL DL<=0.005 MIU/L-ACNC: 0.56 MU/L (ref 0.4–4)

## 2018-09-11 PROCEDURE — 83735 ASSAY OF MAGNESIUM: CPT | Performed by: INTERNAL MEDICINE

## 2018-09-11 PROCEDURE — 84443 ASSAY THYROID STIM HORMONE: CPT | Performed by: INTERNAL MEDICINE

## 2018-09-11 PROCEDURE — 99204 OFFICE O/P NEW MOD 45 MIN: CPT | Performed by: INTERNAL MEDICINE

## 2018-09-11 PROCEDURE — 36415 COLL VENOUS BLD VENIPUNCTURE: CPT | Performed by: INTERNAL MEDICINE

## 2018-09-11 NOTE — LETTER
9/11/2018    Eduardo Mckinnon PA-C  [address] [facility] [address]    RE: Rickey Butt       Dear Colleague,    I had the pleasure of seeing Rickey Butt in the [facility].    HPI and Plan:   See dictation    Orders Placed This Encounter   Procedures     TSH     Magnesium     Follow-Up with Cardiac Advanced Practice Provider     Holter Monitor 24 hour - Adult     Echocardiogram     Exercise Stress Echocardiogram       No orders of the defined types were placed in this encounter.      There are no discontinued medications.      Encounter Diagnoses   Name Primary?     Heart palpitations Yes     PVC's (premature ventricular contractions)        CURRENT MEDICATIONS:  Current Outpatient Prescriptions   Medication Sig Dispense Refill     azelastine (ASTELIN) 0.1 % spray Spray 1 spray into both nostrils 2 times daily (Patient taking differently: Spray 1 spray into both nostrils as needed ) 1 Bottle 3     cetirizine (ZYRTEC) 10 MG tablet Take 10 mg by mouth as needed        fluticasone (FLONASE) 50 MCG/ACT spray Spray 1-2 sprays into both nostrils daily (Patient taking differently: Spray 1-2 sprays into both nostrils as needed ) 1 Bottle 0     montelukast (SINGULAIR) 10 MG tablet Take 1 tablet (10 mg) by mouth At Bedtime (Patient taking differently: Take 10 mg by mouth as needed ) 90 tablet 1     sertraline (ZOLOFT) 25 MG tablet Take 1 tablet (25 mg) by mouth daily Patient taking 1.5 tabs daily 135 tablet 1       ALLERGIES   No Known Allergies    PAST MEDICAL HISTORY:  Past Medical History:   Diagnosis Date     CARDIOVASCULAR SCREENING; LDL GOAL LESS THAN 160 7/18/2013     Family hx of colon cancer 7/18/2013     Family hx of melanoma 7/18/2013     Generalised anxiety disorder 7/18/2013     Heart palpitations      Mild major depression (H) 7/18/2013       PAST SURGICAL HISTORY:  Past Surgical History:   Procedure Laterality Date     APPENDECTOMY  2007     HERNIA REPAIR    "      FAMILY HISTORY:  Family History   Problem Relation Age of Onset     Cancer Mother      melanoma       SOCIAL HISTORY:  Social History     Social History     Marital status:      Spouse name: N/A     Number of children: N/A     Years of education: N/A     Social History Main Topics     Smoking status: Never Smoker     Smokeless tobacco: Never Used     Alcohol use Yes      Comment: one bottle beer per week     Drug use: No     Sexual activity: Yes     Partners: Female     Other Topics Concern     Parent/Sibling W/ Cabg, Mi Or Angioplasty Before 65f 55m? No     Social History Narrative       Review of Systems:  Skin:  Negative       Eyes:  Positive for glasses    ENT:  Negative      Respiratory:  Negative       Cardiovascular:    Positive for;palpitations    Gastroenterology: Negative      Genitourinary:  Negative      Musculoskeletal:  Negative      Neurologic:  Negative      Psychiatric:  Negative      Heme/Lymph/Imm:  Negative      Endocrine:  Negative        Physical Exam:  Vitals: /87  Pulse 59  Ht 1.854 m (6' 1\")  Wt 94.2 kg (207 lb 9.6 oz)  BMI 27.39 kg/m2    Constitutional:  cooperative, alert and oriented, well developed, well nourished, in no acute distress        Skin:  warm and dry to the touch, no apparent skin lesions or masses noted          Head:  normocephalic, no masses or lesions        Eyes:  pupils equal and round, conjunctivae and lids unremarkable, sclera white, no xanthalasma, EOMS intact, no nystagmus        Lymph:      ENT:  no pallor or cyanosis        Neck:  carotid pulses are full and equal bilaterally, JVP normal, no carotid bruit        Respiratory:  normal breath sounds, clear to auscultation, normal A-P diameter, normal symmetry, normal respiratory excursion, no use of accessory muscles         Cardiac: regular rhythm;normal S1 and S2;no S3 or S4;apical impulse not displaced;no murmurs, gallops or rubs detected occasional premature beats              pulses full " and equal                                        GI:  not assessed this visit        Extremities and Muscular Skeletal:  no deformities, clubbing, cyanosis, erythema observed;no edema              Neurological:  no gross motor deficits;affect appropriate        Psych:  Alert and Oriented x 3        CC  Eduardo Mckinnon PA-C  [address] [phone]                Thank you for allowing me to participate in the care of your patient.      Sincerely,     Ángel Amaro MD, MD     [facility]    cc:   Eduardo Mckinnon PA-C  [id number] [address] [phone]

## 2018-09-11 NOTE — LETTER
9/11/2018      Eduardo Mckinnon PA-C  [address]  [facility] [address]      RE: Rickey PRESSLEY Daquan       Dear Colleague,    I had the pleasure of seeing Rickey Butt in the [facility].    Service Date: 09/11/2018      REFERRING PROVIDER:  Eduardo Mckinnon       INDICATION FOR CARDIAC CONSULTATION:  Palpitations.        HISTORY OF PRESENT ILLNESS:  It is my pleasure to see your patient, Rickey Butt.  He is a very pleasant 43-year-old gentleman who noticed on Sunday which was 3 days ago that he began to notice extra heartbeats.  He would notice after the extra heartbeats, that there was a sort of emptiness in his chest which most likely corresponds to the pause after the extrasystole.  They were happening all day and he decided to go into the urgent care.  They performed an EKG and the extrasystoles are in fact PVCs.  He has a nonspecific intraventricular conduction defect in aVF and he has early repolarization changes on the precordial leads and to a lesser extent in I and aVL.  He has no chest pains or chest pressure or unusual shortness of breath.  His potassium at the time was normal at 4.1 but TSH and magnesium were not checked.  His TSH in 2015 was normal.  He does drink about 5 cups of coffee in the morning time and he did have 5 cups of coffee on that Sunday morning but that is no different than previously.  He has never had these palpitations up until this Sunday and he has had them since then.  He has not had an echocardiogram performed.  He has not noticed anything different over this weekend.  He did not take any new medications, either prescription or over-the-counter.  He is not particularly stressed out about anything.  He is sleeping well.  He has some minor stress with financing siding for his house but nothing acute or major.       IMPRESSION:  Palpitations.  These are due to PVCs.  No obvious underlying cause for the PVCs is identifiable at present.       PLAN:     1.  We will obtain an echocardiogram to ensure that the heart is structurally normal.   2.  We will obtain a stress echocardiogram to ensure that ischemia is not the cause of the PVCs.   3.  We will check a serum magnesium and TSH to ensure there is no electrolyte or hormonal causes for the PVCs.     4.  Finally, we will obtain a 24-hour Holter monitor to see the PVC burden.        It is my pleasure to be involved in the care of this very nice patient.      Ángel Guerrero MD, FACC       cc:   Eduardo Mckinnon PA-C    [address] [address]         ÁNGEL GUERRERO MD, FACC             D: 2018   T: 2018   MT: DORETHA      Name:     LEIGH COOL   MRN:      [id number]        Account:      [id number]   :      1975           Service Date: 2018      Document: [id number]           Outpatient Encounter Prescriptions as of 2018   Medication Sig Dispense Refill     azelastine (ASTELIN) 0.1 % spray Spray 1 spray into both nostrils 2 times daily (Patient taking differently: Spray 1 spray into both nostrils as needed ) 1 Bottle 3     cetirizine (ZYRTEC) 10 MG tablet Take 10 mg by mouth as needed        fluticasone (FLONASE) 50 MCG/ACT spray Spray 1-2 sprays into both nostrils daily (Patient taking differently: Spray 1-2 sprays into both nostrils as needed ) 1 Bottle 0     montelukast (SINGULAIR) 10 MG tablet Take 1 tablet (10 mg) by mouth At Bedtime (Patient taking differently: Take 10 mg by mouth as needed ) 90 tablet 1     sertraline (ZOLOFT) 25 MG tablet Take 1 tablet (25 mg) by mouth daily Patient taking 1.5 tabs daily 135 tablet 1     No facility-administered encounter medications on file as of 2018.        Again, thank you for allowing me to participate in the care of your patient.      Sincerely,    Ángel Amaro MD, MD     [facility]

## 2018-09-11 NOTE — PROGRESS NOTES
HPI and Plan:   See dictation    Orders Placed This Encounter   Procedures     TSH     Magnesium     Follow-Up with Cardiac Advanced Practice Provider     Holter Monitor 24 hour - Adult     Echocardiogram     Exercise Stress Echocardiogram       No orders of the defined types were placed in this encounter.      There are no discontinued medications.      Encounter Diagnoses   Name Primary?     Heart palpitations Yes     PVC's (premature ventricular contractions)        CURRENT MEDICATIONS:  Current Outpatient Prescriptions   Medication Sig Dispense Refill     azelastine (ASTELIN) 0.1 % spray Spray 1 spray into both nostrils 2 times daily (Patient taking differently: Spray 1 spray into both nostrils as needed ) 1 Bottle 3     cetirizine (ZYRTEC) 10 MG tablet Take 10 mg by mouth as needed        fluticasone (FLONASE) 50 MCG/ACT spray Spray 1-2 sprays into both nostrils daily (Patient taking differently: Spray 1-2 sprays into both nostrils as needed ) 1 Bottle 0     montelukast (SINGULAIR) 10 MG tablet Take 1 tablet (10 mg) by mouth At Bedtime (Patient taking differently: Take 10 mg by mouth as needed ) 90 tablet 1     sertraline (ZOLOFT) 25 MG tablet Take 1 tablet (25 mg) by mouth daily Patient taking 1.5 tabs daily 135 tablet 1       ALLERGIES   No Known Allergies    PAST MEDICAL HISTORY:  Past Medical History:   Diagnosis Date     CARDIOVASCULAR SCREENING; LDL GOAL LESS THAN 160 7/18/2013     Family hx of colon cancer 7/18/2013     Family hx of melanoma 7/18/2013     Generalised anxiety disorder 7/18/2013     Heart palpitations      Mild major depression (H) 7/18/2013       PAST SURGICAL HISTORY:  Past Surgical History:   Procedure Laterality Date     APPENDECTOMY  2007     HERNIA REPAIR         FAMILY HISTORY:  Family History   Problem Relation Age of Onset     Cancer Mother      melanoma       SOCIAL HISTORY:  Social History     Social History     Marital status:      Spouse name: N/A     Number of  "children: N/A     Years of education: N/A     Social History Main Topics     Smoking status: Never Smoker     Smokeless tobacco: Never Used     Alcohol use Yes      Comment: one bottle beer per week     Drug use: No     Sexual activity: Yes     Partners: Female     Other Topics Concern     Parent/Sibling W/ Cabg, Mi Or Angioplasty Before 65f 55m? No     Social History Narrative       Review of Systems:  Skin:  Negative       Eyes:  Positive for glasses    ENT:  Negative      Respiratory:  Negative       Cardiovascular:    Positive for;palpitations    Gastroenterology: Negative      Genitourinary:  Negative      Musculoskeletal:  Negative      Neurologic:  Negative      Psychiatric:  Negative      Heme/Lymph/Imm:  Negative      Endocrine:  Negative        Physical Exam:  Vitals: /87  Pulse 59  Ht 1.854 m (6' 1\")  Wt 94.2 kg (207 lb 9.6 oz)  BMI 27.39 kg/m2    Constitutional:  cooperative, alert and oriented, well developed, well nourished, in no acute distress        Skin:  warm and dry to the touch, no apparent skin lesions or masses noted          Head:  normocephalic, no masses or lesions        Eyes:  pupils equal and round, conjunctivae and lids unremarkable, sclera white, no xanthalasma, EOMS intact, no nystagmus        Lymph:      ENT:  no pallor or cyanosis        Neck:  carotid pulses are full and equal bilaterally, JVP normal, no carotid bruit        Respiratory:  normal breath sounds, clear to auscultation, normal A-P diameter, normal symmetry, normal respiratory excursion, no use of accessory muscles         Cardiac: regular rhythm;normal S1 and S2;no S3 or S4;apical impulse not displaced;no murmurs, gallops or rubs detected occasional premature beats              pulses full and equal                                        GI:  not assessed this visit        Extremities and Muscular Skeletal:  no deformities, clubbing, cyanosis, erythema observed;no edema              Neurological:  no gross " motor deficits;affect appropriate        Psych:  Alert and Oriented x 3        CC  Eduardo Mckinnon PA-C  [address] [place] [address]

## 2018-09-11 NOTE — PROGRESS NOTES
Service Date: 09/11/2018      REFERRING PROVIDER:  Eduardo Mckinnon       INDICATION FOR CARDIAC CONSULTATION:  Palpitations.        HISTORY OF PRESENT ILLNESS:  It is my pleasure to see your patient, Rickey Butt.  He is a very pleasant 43-year-old gentleman who noticed on Sunday which was 3 days ago that he began to notice extra heartbeats.  He would notice after the extra heartbeats, that there was a sort of emptiness in his chest which most likely corresponds to the pause after the extrasystole.  They were happening all day and he decided to go into the urgent care.  They performed an EKG and the extrasystoles are in fact PVCs.  He has a nonspecific intraventricular conduction defect in aVF and he has early repolarization changes on the precordial leads and to a lesser extent in I and aVL.  He has no chest pains or chest pressure or unusual shortness of breath.  His potassium at the time was normal at 4.1 but TSH and magnesium were not checked.  His TSH in 2015 was normal.  He does drink about 5 cups of coffee in the morning time and he did have 5 cups of coffee on that Sunday morning but that is no different than previously.  He has never had these palpitations up until this Sunday and he has had them since then.  He has not had an echocardiogram performed.  He has not noticed anything different over this weekend.  He did not take any new medications, either prescription or over-the-counter.  He is not particularly stressed out about anything.  He is sleeping well.  He has some minor stress with financing siding for his house but nothing acute or major.       IMPRESSION:  Palpitations.  These are due to PVCs.  No obvious underlying cause for the PVCs is identifiable at present.      PLAN:     1.  We will obtain an echocardiogram to ensure that the heart is structurally normal.   2.  We will obtain a stress echocardiogram to ensure that ischemia is not the cause of the PVCs.   3.  We will check a serum magnesium  and TSH to ensure there is no electrolyte or hormonal causes for the PVCs.     4.  Finally, we will obtain a 24-hour Holter monitor to see the PVC burden.        It is my pleasure to be involved in the care of this very nice patient.      Ángel Guerrero MD, FACC       cc:   Eduardo Mckinnon PA-C    [facility]   [id number] [place] [address]         ÁNGEL GUERRERO MD, FACC             D: 2018   T: 2018   MT: DORETHA      Name:     LEIGH COOL   MRN:      [id number]        Account:      [id number]   :      1975           Service Date: 2018      Document: [id number]

## 2018-09-11 NOTE — MR AVS SNAPSHOT
After Visit Summary   9/11/2018    Rickey Butt    MRN: [id number]           Patient Information     Date Of Birth          1975        Visit Information        Provider Department      9/11/2018 11:15 AM Ángel Amaro MD [facility]        Today's Diagnoses     Heart palpitations    -  1    PVC's (premature ventricular contractions)           Follow-ups after your visit        Additional Services     Follow-Up with Cardiac Advanced Practice Provider                 Your next 10 appointments already scheduled     Sep 12, 2018 11:00 AM CDT   Ech Complete with [id number]   [facility] CV Echocardiography (Cardiovascular Imaging at [facility])    [address] [place] [id number]   [phone]           1.  Please bring or wear a comfortable two-piece outfit. 2.  You may eat, drink and take your normal medicines. 3.  For any questions that cannot be answered, please contact the ordering physician 4.  Please do not wear perfumes or scented lotions on the day of your exam.            Sep 21, 2018 10:00 AM CDT   Ech Stress Test with [id number]   [facility] CV Echocardiography (Cardiovascular Imaging at [facility])    [address] [place] [address]   [phone]           1. Please bring or wear a comfortable two-piece outfit and walking shoes. 2. Stop eating 3 hours before the test. You may drink water or juice. 3. Stop all caffeine 12 hours before the test. This includes coffee, tea, soda pop, chocolate and certain medicines (such as Anacin and Excederin). Also avoid decaf coffee and tea, as these contain small amounts of caffeine. 4. No alcohol, smoking or use of other tobacco products for 12 hours before the test. 5. Refer to your provider instructions to see if you need to stop any medications (such as beta-blockers or nitrates) for this test. 6. For  patients with diabetes: - If you take insulin, call your diabetes care team. Ask if you should take a   dose the morning of your test. - If you take diabetes medicine by mouth, dont take it on the morning of your test. Bring it with you to take after the test. (If you have questions, call your diabetes care team) 7. When you arrive, please tell us if: - You have diabetes. - You have taken Viagra, Cialis or Levitra in the past 48 hours. 8. For any questions that cannot be answered, please contact the ordering physician 9. Please do not wear perfumes or scented lotions on the day of your exam.            Sep 21, 2018 11:30 AM CDT   Holter Monitor with [facility] Heart Imaging ([facility])    [address]  [place] [id number] [phone]              Future tests that were ordered for you today     Open Future Orders        Priority Expected Expires Ordered    Follow-Up with Cardiac Advanced Practice Provider Routine 10/11/2018 9/11/2019 9/11/2018    Holter Monitor 24 hour - Adult Routine 9/18/2018 9/11/2019 9/11/2018    Exercise Stress Echocardiogram Routine 9/18/2018 9/11/2019 9/11/2018    Echocardiogram Routine 9/18/2018 9/11/2019 9/11/2018    Magnesium Routine 9/11/2018 9/11/2019 9/11/2018    TSH Routine 9/11/2018 9/11/2019 9/11/2018            Who to contact     If you have questions or need follow up information about today's clinic visit or your schedule please contact [facility]   [place] directly at [phone].  Normal or non-critical lab and imaging results will be communicated to you by MyChart, letter or phone within 4 business days after the clinic has received the results. If you do not hear from us within 7 days, please contact the clinic through MyChart or phone. If you have a critical or abnormal lab result, we will notify you by phone as soon as possible.  Submit refill requests through [employer] or call  "your pharmacy and they will forward the refill request to us. Please allow 3 business days for your refill to be completed.          Additional Information About Your Visit        GoTuneshart Information     [employer] gives you secure access to your electronic health record. If you see a primary care provider, you can also send messages to your care team and make appointments. If you have questions, please call your primary care clinic.  If you do not have a primary care provider, please call [phone] and they will assist you.        Care EveryWhere ID     This is your Care EveryWhere ID. This could be used by other organizations to access your [place] medical records  [id number]        Your Vitals Were     Pulse Height BMI (Body Mass Index)             59 1.854 m (6' 1\") 27.39 kg/m2          Blood Pressure from Last 3 Encounters:   09/11/18 133/87   09/09/18 122/79   05/04/18 122/80    Weight from Last 3 Encounters:   09/11/18 94.2 kg (207 lb 9.6 oz)   05/04/18 95.3 kg (210 lb)   12/26/16 92.1 kg (203 lb)                 Today's Medication Changes          These changes are accurate as of 9/11/18 11:46 AM.  If you have any questions, ask your nurse or doctor.               These medicines have changed or have updated prescriptions.        Dose/Directions    azelastine 0.1 % nasal spray   Commonly known as:  ASTELIN   This may have changed:    - when to take this  - reasons to take this   Used for:  Acute seasonal allergic rhinitis, unspecified trigger        Dose:  1 spray   Spray 1 spray into both nostrils 2 times daily   Quantity:  1 Bottle   Refills:  3       fluticasone 50 MCG/ACT spray   Commonly known as:  FLONASE   This may have changed:    - when to take this  - reasons to take this   Used for:  Seasonal allergic rhinitis, unspecified allergic rhinitis trigger        Dose:  1-2 spray   Spray 1-2 sprays into both nostrils daily   Quantity:  1 Bottle   Refills:  0       montelukast 10 MG tablet   Commonly " known as:  TURNER   This may have changed:    - when to take this  - reasons to take this   Used for:  Acute seasonal allergic rhinitis due to pollen        Dose:  10 mg   Take 1 tablet (10 mg) by mouth At Bedtime   Quantity:  90 tablet   Refills:  1                Primary Care Provider Office Phone # Fax #    Eduardo Dwain Mckinnon PA-C [phone]       [address] [facility] [address]        Equal Access to Services     PAZ ARAGON : Hadii aad ku hadasho Soomaali, waaxda luqadaha, qaybta kaalmada adeegyada, waxay idiin hayaan adeeg kharashyam lacarlos . So [facility] [phone].    ATENCIÓN: Si niels granado, tiene a etienne disposición servicios gratuitos de asistencia lingüística. [facility] [phone].    We comply with applicable federal civil rights laws and [place] laws. We do not discriminate on the basis of race, color, national origin, age, disability, sex, sexual orientation, or gender identity.            Thank you!     Thank you for choosing [facility]  for your care. Our goal is always to provide you with excellent care. Hearing back from our patients is one way we can continue to improve our services. Please take a few minutes to complete the written survey that you may receive in the mail after your visit with us. Thank you!             Your Updated Medication List - Protect others around you: Learn how to safely use, store and throw away your medicines at www.disposemymeds.org.          This list is accurate as of 9/11/18 11:46 AM.  Always use your most recent med list.                   Brand Name Dispense Instructions for use Diagnosis    azelastine 0.1 % nasal spray    ASTELIN    1 Bottle    Spray 1 spray into both nostrils 2 times daily    Acute seasonal allergic rhinitis, unspecified trigger       cetirizine 10 MG tablet    zyrTEC     Take 10 mg by mouth as needed        fluticasone 50 MCG/ACT spray    FLONASE    1 Bottle    Spray 1-2 sprays  into both nostrils daily    Seasonal allergic rhinitis, unspecified allergic rhinitis trigger       montelukast 10 MG tablet    SINGULAIR    90 tablet    Take 1 tablet (10 mg) by mouth At Bedtime    Acute seasonal allergic rhinitis due to pollen       sertraline 25 MG tablet    ZOLOFT    135 tablet    Take 1 tablet (25 mg) by mouth daily Patient taking 1.5 tabs daily    Mild major depression (H), Generalized anxiety disorder

## 2018-09-12 ENCOUNTER — HOSPITAL ENCOUNTER (OUTPATIENT)
Dept: CARDIOLOGY | Facility: CLINIC | Age: 43
Discharge: HOME | End: 2018-09-12
Attending: INTERNAL MEDICINE | Admitting: INTERNAL MEDICINE
Payer: COMMERCIAL

## 2018-09-12 PROCEDURE — 93306 TTE W/DOPPLER COMPLETE: CPT

## 2018-09-12 PROCEDURE — 93306 TTE W/DOPPLER COMPLETE: CPT | Mod: 26 | Performed by: INTERNAL MEDICINE

## 2018-09-21 ENCOUNTER — HOSPITAL ENCOUNTER (OUTPATIENT)
Dept: CARDIOLOGY | Facility: CLINIC | Age: 43
End: 2018-09-21
Attending: INTERNAL MEDICINE
Payer: COMMERCIAL

## 2018-09-21 ENCOUNTER — HOSPITAL ENCOUNTER (OUTPATIENT)
Dept: CARDIOLOGY | Facility: CLINIC | Age: 43
Discharge: HOME | End: 2018-09-21
Attending: INTERNAL MEDICINE | Admitting: INTERNAL MEDICINE
Payer: COMMERCIAL

## 2018-09-21 ENCOUNTER — CARE COORDINATION (OUTPATIENT)
Dept: CARDIOLOGY | Facility: CLINIC | Age: 43
End: 2018-09-21

## 2018-09-21 DIAGNOSIS — I49.3 PVC'S (PREMATURE VENTRICULAR CONTRACTIONS): Primary | ICD-10-CM

## 2018-09-21 DIAGNOSIS — I49.3 PVC'S (PREMATURE VENTRICULAR CONTRACTIONS): ICD-10-CM

## 2018-09-21 DIAGNOSIS — I47.29 PAROXYSMAL VENTRICULAR TACHYCARDIA (H): ICD-10-CM

## 2018-09-21 PROCEDURE — 93226 XTRNL ECG REC<48 HR SCAN A/R: CPT

## 2018-09-21 PROCEDURE — 93325 DOPPLER ECHO COLOR FLOW MAPG: CPT | Mod: TC

## 2018-09-21 PROCEDURE — 93350 STRESS TTE ONLY: CPT | Mod: 26 | Performed by: INTERNAL MEDICINE

## 2018-09-21 PROCEDURE — 93227 XTRNL ECG REC<48 HR R&I: CPT | Performed by: INTERNAL MEDICINE

## 2018-09-21 PROCEDURE — 93321 DOPPLER ECHO F-UP/LMTD STD: CPT | Mod: 26 | Performed by: INTERNAL MEDICINE

## 2018-09-21 PROCEDURE — 25500064 ZZH RX 255 OP 636: Performed by: INTERNAL MEDICINE

## 2018-09-21 PROCEDURE — 93018 CV STRESS TEST I&R ONLY: CPT | Performed by: INTERNAL MEDICINE

## 2018-09-21 PROCEDURE — 93016 CV STRESS TEST SUPVJ ONLY: CPT | Performed by: INTERNAL MEDICINE

## 2018-09-21 PROCEDURE — 93325 DOPPLER ECHO COLOR FLOW MAPG: CPT | Mod: 26 | Performed by: INTERNAL MEDICINE

## 2018-09-21 RX ADMIN — HUMAN ALBUMIN MICROSPHERES AND PERFLUTREN 9 ML: 10; .22 INJECTION, SOLUTION INTRAVENOUS at 10:18

## 2018-09-21 NOTE — PROGRESS NOTES
Pt w/ hx of palpitations, PVCs. Seen by Dr. Meek Guerrero 9/11 and sent for echo, stress echo and labs, as below, all essential WNL. Holter is pending and pt has f/u with Nathaniel Alaniz CNP 10/25. Reviewed results below w/ pt and asked him to call prior to f/u w/ concerns/questions. FYI to Dr. FELIZ.    9/11/18 echo showed:  The visual ejection fraction is estimated at 60-65%.  The right ventricle is normal in size and function.  Sinus rhythm was noted.  There is no comparison study available.    9/21/18 stress echo showed:  1. Exercise echocardiogram is negative for myocardial ischemia at a peak heart  rate of 157 BPM (88% of maximal predicted heart rate).  2. No regional wall motion abnormalities.  3. Left ventricular ejection fraction increased from 65% to 75%.  4. Negative stress ECG.  5. Above-average exercise capacity (13.4 METS).     There is no comparison study available.    9/11/18 labs:   Component      Latest Ref Rng & Units 9/11/2018   TSH      0.40 - 4.00 mU/L 0.56   Magnesium      1.6 - 2.3 mg/dL 2.4 (H)     Huma Patel [facility] RN 3:32 PM 09/21/18  [phone]

## 2018-09-21 NOTE — PROGRESS NOTES
Will be seeing Nathaniel in 3 weeks. Steco normal. Small run of VT and PVCs. Would add metoprolol succinate 25 mg per day and will see if symptoms improve prior to visit. Fallon

## 2018-09-24 LAB — INTERPRETATION MONITOR -MUSE: NORMAL

## 2018-09-25 NOTE — PROGRESS NOTES
Reviewed Holter report showing       Pt has office visit 10/25/18 w/ Nathaniel Penny NP. Will message Dr. Amaro to review. LPenfield RN

## 2018-09-28 RX ORDER — METOPROLOL SUCCINATE 25 MG/1
25 TABLET, EXTENDED RELEASE ORAL DAILY
Qty: 30 TABLET | Refills: 2 | Status: SHIPPED | OUTPATIENT
Start: 2018-09-28 | End: 2018-10-30

## 2018-09-28 NOTE — PROGRESS NOTES
Called pt with recommendations from Dr. Amaro to start metoprolol succinate 25 mg daily and follow up with Nathaniel domingo has scheduled. Pt advised to take the metoprolol in the evening or at bedtime to help with fatigue. Prescription escripted to [facility] as requested. LPenfield RN

## 2018-10-30 ENCOUNTER — OFFICE VISIT (OUTPATIENT)
Dept: CARDIOLOGY | Facility: CLINIC | Age: 43
End: 2018-10-30
Attending: INTERNAL MEDICINE
Payer: COMMERCIAL

## 2018-10-30 VITALS
WEIGHT: 206 LBS | BODY MASS INDEX: 27.3 KG/M2 | DIASTOLIC BLOOD PRESSURE: 68 MMHG | SYSTOLIC BLOOD PRESSURE: 112 MMHG | HEIGHT: 73 IN | HEART RATE: 60 BPM

## 2018-10-30 DIAGNOSIS — I47.29 PAROXYSMAL VENTRICULAR TACHYCARDIA (H): ICD-10-CM

## 2018-10-30 DIAGNOSIS — I49.3 PVC'S (PREMATURE VENTRICULAR CONTRACTIONS): ICD-10-CM

## 2018-10-30 PROCEDURE — 99213 OFFICE O/P EST LOW 20 MIN: CPT | Performed by: NURSE PRACTITIONER

## 2018-10-30 RX ORDER — METOPROLOL SUCCINATE 25 MG/1
25 TABLET, EXTENDED RELEASE ORAL DAILY
Qty: 90 TABLET | Refills: 3 | Status: SHIPPED | OUTPATIENT
Start: 2018-10-30 | End: 2019-11-21

## 2018-10-30 NOTE — MR AVS SNAPSHOT
After Visit Summary   10/30/2018    Rickey Butt    MRN: [id number]           Patient Information     Date Of Birth          1975        Visit Information        Provider Department      10/30/2018 7:30 AM Nathaniel Penny APRN CNP [facility]        Today's Diagnoses     PVC's (premature ventricular contractions)        Paroxysmal ventricular tachycardia (H)          Care Instructions    Stay on metoprolol for now  See us back in follow up end of January/february to discuss plan for staying on metoprolol          Follow-ups after your visit        Additional Services     Follow-Up with Cardiologist                 Future tests that were ordered for you today     Open Future Orders        Priority Expected Expires Ordered    Follow-Up with Cardiologist Routine 2/1/2019 10/30/2019 10/30/2018            Who to contact     If you have questions or need follow up information about today's clinic visit or your schedule please contact [facility] directly at [phone].  Normal or non-critical lab and imaging results will be communicated to you by Places[employer]hart, letter or phone within 4 business days after the clinic has received the results. If you do not hear from us within 7 days, please contact the clinic through [employer] or phone. If you have a critical or abnormal lab result, we will notify you by phone as soon as possible.  Submit refill requests through [employer] or call your pharmacy and they will forward the refill request to us. Please allow 3 business days for your refill to be completed.          Additional Information About Your Visit        Placesterhart Information     [employer] gives you secure access to your electronic health record. If you see a primary care provider, you can also send messages to your care team and make appointments. If you have questions, please call your primary care clinic.  If you do not  "have a primary care provider, please call [phone] and they will assist you.        Care EveryWhere ID     This is your Care EveryWhere ID. This could be used by other organizations to access your [place] medical records  [id number]        Your Vitals Were     Pulse Height BMI (Body Mass Index)             60 1.854 m (6' 1\") 27.18 kg/m2          Blood Pressure from Last 3 Encounters:   10/30/18 112/68   09/11/18 133/87   09/09/18 122/79    Weight from Last 3 Encounters:   10/30/18 93.4 kg (206 lb)   09/11/18 94.2 kg (207 lb 9.6 oz)   05/04/18 95.3 kg (210 lb)              We Performed the Following     Follow-Up with Cardiac Advanced Practice Provider          Today's Medication Changes          These changes are accurate as of 10/30/18  7:55 AM.  If you have any questions, ask your nurse or doctor.               These medicines have changed or have updated prescriptions.        Dose/Directions    azelastine 0.1 % nasal spray   Commonly known as:  ASTELIN   This may have changed:    - when to take this  - reasons to take this   Used for:  Acute seasonal allergic rhinitis, unspecified trigger        Dose:  1 spray   Spray 1 spray into both nostrils 2 times daily   Quantity:  1 Bottle   Refills:  3       fluticasone 50 MCG/ACT spray   Commonly known as:  FLONASE   This may have changed:    - when to take this  - reasons to take this   Used for:  Seasonal allergic rhinitis, unspecified allergic rhinitis trigger        Dose:  1-2 spray   Spray 1-2 sprays into both nostrils daily   Quantity:  1 Bottle   Refills:  0       montelukast 10 MG tablet   Commonly known as:  SINGULAIR   This may have changed:    - when to take this  - reasons to take this   Used for:  Acute seasonal allergic rhinitis due to pollen        Dose:  10 mg   Take 1 tablet (10 mg) by mouth At Bedtime   Quantity:  90 tablet   Refills:  1            Where to get your medicines      These medications were sent to [id number] [place] " [place], [place] - [address]  [address], [facility] [address]     Phone:  [phone]     metoprolol succinate 25 MG 24 hr tablet                Primary Care Provider Office Phone # Fax #    Eduardo Dwain Mckinnon PA-C [phone]       [address]  [facility] [address]        Equal Access to Services     PAZ ARAGON : Hadii aad ku hadasho Soomaali, waaxda luqadaha, qaybta kaalmada adeegyada, waxay idiin hayaan adeeg kharash la'aan . So [facility] [phone].    ATENCIÓN: Si niels granado, tiene a etienne disposición servicios gratuitos de asistencia lingüística. [facility] [phone].    We comply with applicable federal civil rights laws and [place] laws. We do not discriminate on the basis of race, color, national origin, age, disability, sex, sexual orientation, or gender identity.            Thank you!     Thank you for choosing [facility]  for your care. Our goal is always to provide you with excellent care. Hearing back from our patients is one way we can continue to improve our services. Please take a few minutes to complete the written survey that you may receive in the mail after your visit with us. Thank you!             Your Updated Medication List - Protect others around you: Learn how to safely use, store and throw away your medicines at www.disposemymeds.org.          This list is accurate as of 10/30/18  7:55 AM.  Always use your most recent med list.                   Brand Name Dispense Instructions for use Diagnosis    azelastine 0.1 % nasal spray    ASTELIN    1 Bottle    Spray 1 spray into both nostrils 2 times daily    Acute seasonal allergic rhinitis, unspecified trigger       cetirizine 10 MG tablet    zyrTEC     Take 10 mg by mouth as needed        fluticasone 50 MCG/ACT spray    FLONASE    1 Bottle    Spray 1-2 sprays into both nostrils daily    Seasonal allergic rhinitis, unspecified  allergic rhinitis trigger       metoprolol succinate 25 MG 24 hr tablet    TOPROL-XL    90 tablet    Take 1 tablet (25 mg) by mouth daily At bedtime    PVC's (premature ventricular contractions), Paroxysmal ventricular tachycardia (H)       montelukast 10 MG tablet    SINGULAIR    90 tablet    Take 1 tablet (10 mg) by mouth At Bedtime    Acute seasonal allergic rhinitis due to pollen       sertraline 25 MG tablet    ZOLOFT    135 tablet    Take 1 tablet (25 mg) by mouth daily Patient taking 1.5 tabs daily    Mild major depression (H), Generalized anxiety disorder

## 2018-10-30 NOTE — PROGRESS NOTES
History of Present Illness:    Rickey Butt is a 43 year old male who recently met Dr. Mariano Guerrero.  He was noting extra heartbeats and palpitations which he felt has an emptiness in his chest at times.  EKG at urgent care confirmed PVCs.  He has a nonspecific intraventricular conduction defect and aVF and he has repolarization changes on the precordial leads and to some extent in lead I and aVL, although less.    He has no cardiac symptoms.    He underwent a battery of tests which she is here to review today.  Since he knows he is reduced his coffee intake from 5 cups in the morning to 1 cup.  He notes some fatigue with this.  He takes Zoloft for depression with some components of anxiety and has seasonal allergies.    He underwent an echocardiogram which is essentially normal.  Stress echocardiogram also shows no ischemia.  He is aerobically fit.  Holter monitor was performed showing a 6% PVC burden with one, 7 beat run of nonsustained ventricular tachycardia and occasional PACs.  TSH and magnesium levels were normal.    Following these results Dr. Meek Guerrero suggested the patient begin metoprolol XL 25 mg a day which she is taking in the evening.  He has had a marked improvement in his palpitations with this and his caffeine reduction.    All of his test results were reviewed.  He wishes to stay on the medication for a few months and then reassess longevity or use as needed .    Impression/Plan:     1.  Premature ventricular contractions  -Normal LV function  -No ischemia  -Normal magnesium and TSH  -No associated symptoms of syncope or presyncope  -tolerating Metoprolol XL 25 mg a day well with reduction in his PVC's  -He has limited his caffeine intake  -Follow-up with us in 4 months to determine length of metoprolol therapy or if this could be used on an as-needed basis in the future.    It has been a pleasure seeing Rickey Butt in follow up today.    Nathaniel Prakash-Katty, MSN, APRN-BC,  CNP  Cardiology    No orders of the defined types were placed in this encounter.    No orders of the defined types were placed in this encounter.    There are no discontinued medications.      Encounter Diagnosis   Name Primary?     PVC's (premature ventricular contractions)        CURRENT MEDICATIONS:  Current Outpatient Prescriptions   Medication Sig Dispense Refill     azelastine (ASTELIN) 0.1 % spray Spray 1 spray into both nostrils 2 times daily (Patient taking differently: Spray 1 spray into both nostrils as needed ) 1 Bottle 3     cetirizine (ZYRTEC) 10 MG tablet Take 10 mg by mouth as needed        fluticasone (FLONASE) 50 MCG/ACT spray Spray 1-2 sprays into both nostrils daily (Patient taking differently: Spray 1-2 sprays into both nostrils as needed ) 1 Bottle 0     metoprolol succinate (TOPROL-XL) 25 MG 24 hr tablet Take 1 tablet (25 mg) by mouth daily At bedtime 30 tablet 2     montelukast (SINGULAIR) 10 MG tablet Take 1 tablet (10 mg) by mouth At Bedtime (Patient taking differently: Take 10 mg by mouth as needed ) 90 tablet 1     sertraline (ZOLOFT) 25 MG tablet Take 1 tablet (25 mg) by mouth daily Patient taking 1.5 tabs daily 135 tablet 1       ALLERGIES   No Known Allergies    PAST MEDICAL HISTORY:  Past Medical History:   Diagnosis Date     CARDIOVASCULAR SCREENING; LDL GOAL LESS THAN 160 7/18/2013     Family hx of colon cancer 7/18/2013     Family hx of melanoma 7/18/2013     Generalised anxiety disorder 7/18/2013     Heart palpitations      Mild major depression (H) 7/18/2013       PAST SURGICAL HISTORY:  Past Surgical History:   Procedure Laterality Date     APPENDECTOMY  2007     HERNIA REPAIR         FAMILY HISTORY:  Family History   Problem Relation Age of Onset     Cancer Mother      melanoma       SOCIAL HISTORY:  Social History     Social History     Marital status:      Spouse name: N/A     Number of children: N/A     Years of education: N/A     Social History Main Topics      "Smoking status: Never Smoker     Smokeless tobacco: Never Used     Alcohol use Yes      Comment: one bottle beer per week     Drug use: No     Sexual activity: Yes     Partners: Female     Other Topics Concern     Parent/Sibling W/ Cabg, Mi Or Angioplasty Before 65f 55m? No     Social History Narrative       Review of Systems:  Skin:  Negative       Eyes:  Positive for glasses    ENT:  Negative      Respiratory:  Negative       Cardiovascular:    Positive for;palpitations (no longer aware of them)    Gastroenterology: Negative      Genitourinary:  Negative      Musculoskeletal:  Negative      Neurologic:  Negative      Psychiatric:  Negative      Heme/Lymph/Imm:  Negative      Endocrine:  Negative        Physical Exam:  Vitals: /68  Pulse 60  Ht 1.854 m (6' 1\")  Wt 93.4 kg (206 lb)  BMI 27.18 kg/m2    Constitutional:           Skin:             Head:           Eyes:           Lymph:      ENT:           Neck:           Respiratory:            Cardiac:                                                           GI:           Extremities and Muscular Skeletal:                 Neurological:           Psych:         Recent Lab Results:  LIPID RESULTS:  Lab Results   Component Value Date    CHOL 145 07/18/2013    HDL 38 (L) 07/18/2013    LDL 89 07/18/2013    TRIG 92 07/18/2013    CHOLHDLRATIO 3.8 07/18/2013       LIVER ENZYME RESULTS:  Lab Results   Component Value Date    AST 24 07/18/2013    ALT 35 07/18/2013       CBC RESULTS:  Lab Results   Component Value Date    WBC 3.4 (L) 07/18/2013    RBC 4.81 07/18/2013    HGB 14.9 07/18/2013    HCT 43.4 07/18/2013    MCV 90 07/18/2013    MCH 31.0 07/18/2013    MCHC 34.3 07/18/2013    RDW 12.8 07/18/2013     07/18/2013       BMP RESULTS:  Lab Results   Component Value Date     09/09/2018    POTASSIUM 4.1 09/09/2018    CHLORIDE 112 (H) 09/09/2018    CO2 25 09/09/2018    ANIONGAP 6 09/09/2018    GLC 90 09/09/2018    BUN 12 09/09/2018    CR 0.91 09/09/2018    " GFRESTIMATED >90 09/09/2018    GFRESTBLACK >90 09/09/2018    ANDREW 8.8 09/09/2018        A1C RESULTS:  No results found for: A1C    INR RESULTS:  No results found for: INR        CC  Ángel Amaro MD  [address]  [place] [address]

## 2018-10-30 NOTE — LETTER
10/30/2018    Eduardo Mckinnon PA-C  [address]  [facility] [address]    RE: Rickey Butt       Dear Colleague,    I had the pleasure of seeing Rickey Butt in the [facility].    History of Present Illness:    Rickey Butt is a 43 year old male who recently met Dr. Mariano Guerrero.  He was noting extra heartbeats and palpitations which he felt has an emptiness in his chest at times.  EKG at urgent care confirmed PVCs.  He has a nonspecific intraventricular conduction defect and aVF and he has repolarization changes on the precordial leads and to some extent in lead I and aVL, although less.    He has no cardiac symptoms.    He underwent a battery of tests which she is here to review today.  Since he knows he is reduced his coffee intake from 5 cups in the morning to 1 cup.  He notes some fatigue with this.  He takes Zoloft for depression with some components of anxiety and has seasonal allergies.    He underwent an echocardiogram which is essentially normal.  Stress echocardiogram also shows no ischemia.  He is aerobically fit.  Holter monitor was performed showing a 6% PVC burden with one, 7 beat run of nonsustained ventricular tachycardia and occasional PACs.  TSH and magnesium levels were normal.    Following these results Dr. Meek Guerrero suggested the patient begin metoprolol XL 25 mg a day which she is taking in the evening.  He has had a marked improvement in his palpitations with this and his caffeine reduction.    All of his test results were reviewed.  He wishes to stay on the medication for a few months and then reassess longevity or use as needed .    Impression/Plan:     1.  Premature ventricular contractions  -Normal LV function  -No ischemia  -Normal magnesium and TSH  -No associated symptoms of syncope or presyncope  -tolerating Metoprolol XL 25 mg a day well with reduction in his PVC's  -He has limited his caffeine intake  -Follow-up with us  in 4 months to determine length of metoprolol therapy or if this could be used on an as-needed basis in the future.    It has been a pleasure seeing Rickey Butt in follow up today.    Nathaniel Penny, MSN, APRN-BC, CNP  Cardiology    No orders of the defined types were placed in this encounter.    No orders of the defined types were placed in this encounter.    There are no discontinued medications.      Encounter Diagnosis   Name Primary?     PVC's (premature ventricular contractions)        CURRENT MEDICATIONS:  Current Outpatient Prescriptions   Medication Sig Dispense Refill     azelastine (ASTELIN) 0.1 % spray Spray 1 spray into both nostrils 2 times daily (Patient taking differently: Spray 1 spray into both nostrils as needed ) 1 Bottle 3     cetirizine (ZYRTEC) 10 MG tablet Take 10 mg by mouth as needed        fluticasone (FLONASE) 50 MCG/ACT spray Spray 1-2 sprays into both nostrils daily (Patient taking differently: Spray 1-2 sprays into both nostrils as needed ) 1 Bottle 0     metoprolol succinate (TOPROL-XL) 25 MG 24 hr tablet Take 1 tablet (25 mg) by mouth daily At bedtime 30 tablet 2     montelukast (SINGULAIR) 10 MG tablet Take 1 tablet (10 mg) by mouth At Bedtime (Patient taking differently: Take 10 mg by mouth as needed ) 90 tablet 1     sertraline (ZOLOFT) 25 MG tablet Take 1 tablet (25 mg) by mouth daily Patient taking 1.5 tabs daily 135 tablet 1       ALLERGIES   No Known Allergies    PAST MEDICAL HISTORY:  Past Medical History:   Diagnosis Date     CARDIOVASCULAR SCREENING; LDL GOAL LESS THAN 160 7/18/2013     Family hx of colon cancer 7/18/2013     Family hx of melanoma 7/18/2013     Generalised anxiety disorder 7/18/2013     Heart palpitations      Mild major depression (H) 7/18/2013       PAST SURGICAL HISTORY:  Past Surgical History:   Procedure Laterality Date     APPENDECTOMY  2007     HERNIA REPAIR         FAMILY HISTORY:  Family History   Problem Relation Age of Onset      "Cancer Mother      melanoma       SOCIAL HISTORY:  Social History     Social History     Marital status:      Spouse name: N/A     Number of children: N/A     Years of education: N/A     Social History Main Topics     Smoking status: Never Smoker     Smokeless tobacco: Never Used     Alcohol use Yes      Comment: one bottle beer per week     Drug use: No     Sexual activity: Yes     Partners: Female     Other Topics Concern     Parent/Sibling W/ Cabg, Mi Or Angioplasty Before 65f 55m? No     Social History Narrative       Review of Systems:  Skin:  Negative       Eyes:  Positive for glasses    ENT:  Negative      Respiratory:  Negative       Cardiovascular:    Positive for;palpitations (no longer aware of them)    Gastroenterology: Negative      Genitourinary:  Negative      Musculoskeletal:  Negative      Neurologic:  Negative      Psychiatric:  Negative      Heme/Lymph/Imm:  Negative      Endocrine:  Negative        Physical Exam:  Vitals: /68  Pulse 60  Ht 1.854 m (6' 1\")  Wt 93.4 kg (206 lb)  BMI 27.18 kg/m2    Constitutional:           Skin:             Head:           Eyes:           Lymph:      ENT:           Neck:           Respiratory:            Cardiac:                                                           GI:           Extremities and Muscular Skeletal:                 Neurological:           Psych:         Recent Lab Results:  LIPID RESULTS:  Lab Results   Component Value Date    CHOL 145 07/18/2013    HDL 38 (L) 07/18/2013    LDL 89 07/18/2013    TRIG 92 07/18/2013    CHOLHDLRATIO 3.8 07/18/2013       LIVER ENZYME RESULTS:  Lab Results   Component Value Date    AST 24 07/18/2013    ALT 35 07/18/2013       CBC RESULTS:  Lab Results   Component Value Date    WBC 3.4 (L) 07/18/2013    RBC 4.81 07/18/2013    HGB 14.9 07/18/2013    HCT 43.4 07/18/2013    MCV 90 07/18/2013    MCH 31.0 07/18/2013    MCHC 34.3 07/18/2013    RDW 12.8 07/18/2013     07/18/2013       BMP RESULTS:  Lab " Results   Component Value Date     09/09/2018    POTASSIUM 4.1 09/09/2018    CHLORIDE 112 (H) 09/09/2018    CO2 25 09/09/2018    ANIONGAP 6 09/09/2018    GLC 90 09/09/2018    BUN 12 09/09/2018    CR 0.91 09/09/2018    GFRESTIMATED >90 09/09/2018    GFRESTBLACK >90 09/09/2018    ANDREW 8.8 09/09/2018        A1C RESULTS:  No results found for: A1C    INR RESULTS:  No results found for: INR      Thank you for allowing me to participate in the care of your patient.    Sincerely,     KIM Kerr [facility]

## 2018-10-30 NOTE — PATIENT INSTRUCTIONS
Stay on metoprolol for now  See us back in follow up end of January/february to discuss plan for staying on metoprolol

## 2019-02-21 ENCOUNTER — OFFICE VISIT (OUTPATIENT)
Dept: CARDIOLOGY | Facility: CLINIC | Age: 44
End: 2019-02-21
Payer: COMMERCIAL

## 2019-02-21 VITALS
SYSTOLIC BLOOD PRESSURE: 112 MMHG | DIASTOLIC BLOOD PRESSURE: 68 MMHG | BODY MASS INDEX: 27.82 KG/M2 | HEART RATE: 60 BPM | HEIGHT: 73 IN | WEIGHT: 209.9 LBS

## 2019-02-21 DIAGNOSIS — I49.3 PVC'S (PREMATURE VENTRICULAR CONTRACTIONS): ICD-10-CM

## 2019-02-21 DIAGNOSIS — R00.2 HEART PALPITATIONS: Primary | ICD-10-CM

## 2019-02-21 PROCEDURE — 99213 OFFICE O/P EST LOW 20 MIN: CPT | Performed by: INTERNAL MEDICINE

## 2019-02-21 ASSESSMENT — MIFFLIN-ST. JEOR: SCORE: 1905.98

## 2019-02-21 NOTE — PROGRESS NOTES
Service Date: 02/21/2019      REFERRING PHYSICIAN:  Eduardo Mckinnon PA-C.       HISTORY OF PRESENT ILLNESS:  It was my pleasure to see your patient, Rickey Butt.  He is an extremely pleasant man with a history of palpitations due to premature ventricular contractions.  As part of his workup, he had an echocardiogram which essentially shows a structurally normal heart and he underwent a stress echocardiogram which showed no evidence of ischemia or inducible arrhythmias.  The Holter monitor did show that he had frequent PVCs.  He was having 6340 PVCs in a 24-hour period. That is 6% of QRS complexes.  He had 1 ventricular run which was 7 beats long.  He was placed on low-dose metoprolol succinate 25 mg at nighttime and he also cut back significantly on his caffeine intake.  If you remember, he was drinking 5 cups of coffee a morning.  Also, if you remember his magnesium was normal, his TSH was normal, and his electrolytes were normal as well.  Since November, he has really had no palpitations at all, so things are going well for him.  Given the frequency of his PVCs as 6% of the total complexes, he is not at risk of developing a rate-related cardiomyopathy due to his frequent PVCs.  He is having no side effects from the metoprolol that he knows of.  His blood pressure is well controlled today at 112/68.  His pulse is 60 beats per minute.      IMPRESSION:  Palpitations.  These have been effectively suppressed by metoprolol and by greatly reducing his caffeine intake.  There is no evidence of structural heart disease or ischemia.      PLAN:  We will continue on his present regimen, but we will see him again in 6 months' time.  He was wondering if he could stop the metoprolol medication possibly at that time and I explained to him everybody is different.  Some people would have rebound of their PVCs, other people will do well simply by eliminating caffeine from their diet, so he will see how he does at that time.       As always, he has been told to contact me if he has any questions or any concerns.      cc:   Eduardo [place] [address]         LU WILSON MD, [facility]             D: 2019   T: 2019   MT: CALEB      Name:     LEIGH COOL   MRN:      [id number]        Account:      [id number]   :      1975           Service Date: 2019      Document: [id number]

## 2019-02-21 NOTE — PROGRESS NOTES
HPI and Plan:   See dictation    Orders Placed This Encounter   Procedures     Follow-Up with Cardiologist     EKG 12-lead complete w/read - Clinics       No orders of the defined types were placed in this encounter.      There are no discontinued medications.      Encounter Diagnoses   Name Primary?     PVC's (premature ventricular contractions)      Heart palpitations Yes       CURRENT MEDICATIONS:  Current Outpatient Medications   Medication Sig Dispense Refill     cetirizine (ZYRTEC) 10 MG tablet Take 10 mg by mouth as needed        fluticasone (FLONASE) 50 MCG/ACT spray Spray 1-2 sprays into both nostrils daily (Patient taking differently: Spray 1-2 sprays into both nostrils as needed ) 1 Bottle 0     metoprolol succinate (TOPROL-XL) 25 MG 24 hr tablet Take 1 tablet (25 mg) by mouth daily At bedtime 90 tablet 3     montelukast (SINGULAIR) 10 MG tablet Take 1 tablet (10 mg) by mouth At Bedtime (Patient taking differently: Take 10 mg by mouth as needed ) 90 tablet 1     sertraline (ZOLOFT) 25 MG tablet Take 1 tablet (25 mg) by mouth daily Patient taking 1.5 tabs daily 135 tablet 1     azelastine (ASTELIN) 0.1 % spray Spray 1 spray into both nostrils 2 times daily (Patient not taking: Reported on 2/21/2019) 1 Bottle 3       ALLERGIES     Allergies   Allergen Reactions     Seasonal Allergies        PAST MEDICAL HISTORY:  Past Medical History:   Diagnosis Date     CARDIOVASCULAR SCREENING; LDL GOAL LESS THAN 160 7/18/2013     Family hx of colon cancer 7/18/2013     Family hx of melanoma 7/18/2013     Generalised anxiety disorder 7/18/2013     Heart palpitations      Mild major depression (H) 7/18/2013       PAST SURGICAL HISTORY:  Past Surgical History:   Procedure Laterality Date     APPENDECTOMY  2007     HERNIA REPAIR         FAMILY HISTORY:  Family History   Problem Relation Age of Onset     Cancer Mother         melanoma       SOCIAL HISTORY:  Social History     Socioeconomic History     Marital status:   "    Spouse name: None     Number of children: None     Years of education: None     Highest education level: None   Occupational History     None   Social Needs     Financial resource strain: None     Food insecurity:     Worry: None     Inability: None     Transportation needs:     Medical: None     Non-medical: None   Tobacco Use     Smoking status: Never Smoker     Smokeless tobacco: Never Used   Substance and Sexual Activity     Alcohol use: Yes     Comment: five bottles of beer per week     Drug use: No     Sexual activity: Yes     Partners: Female   Lifestyle     Physical activity:     Days per week: None     Minutes per session: None     Stress: None   Relationships     Social connections:     Talks on phone: None     Gets together: None     Attends Restoration service: None     Active member of club or organization: None     Attends meetings of clubs or organizations: None     Relationship status: None     Intimate partner violence:     Fear of current or ex partner: None     Emotionally abused: None     Physically abused: None     Forced sexual activity: None   Other Topics Concern     Parent/sibling w/ CABG, MI or angioplasty before 65F 55M? No   Social History Narrative     None       Review of Systems:  Skin:  Negative       Eyes:  Positive for glasses    ENT:  Negative      Respiratory:  Negative       Cardiovascular:  Negative      Gastroenterology: Negative      Genitourinary:  Negative      Musculoskeletal:  Negative      Neurologic:  Negative      Psychiatric:  Positive for depression    Heme/Lymph/Imm:  Negative      Endocrine:  Negative        Physical Exam:  Vitals: /68 (BP Location: Right arm, Patient Position: Chair, Cuff Size: Adult Large)   Pulse 60   Ht 1.854 m (6' 1\")   Wt 95.2 kg (209 lb 14.4 oz)   BMI 27.69 kg/m      Constitutional:  cooperative, alert and oriented, well developed, well nourished, in no acute distress        Skin:  warm and dry to the touch, no apparent skin " lesions or masses noted          Head:  normocephalic, no masses or lesions        Eyes:  pupils equal and round, conjunctivae and lids unremarkable, sclera white, no xanthalasma, EOMS intact, no nystagmus        Lymph:      ENT:  no pallor or cyanosis        Neck:  carotid pulses are full and equal bilaterally, JVP normal, no carotid bruit        Respiratory:  normal breath sounds, clear to auscultation, normal A-P diameter, normal symmetry, normal respiratory excursion, no use of accessory muscles         Cardiac: regular rhythm;normal S1 and S2;no S3 or S4;apical impulse not displaced;no murmurs, gallops or rubs detected                pulses full and equal                                        GI:  not assessed this visit        Extremities and Muscular Skeletal:  no deformities, clubbing, cyanosis, erythema observed;no edema              Neurological:  no gross motor deficits;affect appropriate        Psych:  Alert and Oriented x 3        CC  KIM Kerr CNP  [address]  [place] [address]

## 2019-02-21 NOTE — LETTER
2/21/2019    Eduardo Mckinnon PA-C  [address] [address] [facility] [address]    RE: Rickey Butt       Dear Colleague,    I had the pleasure of seeing Rickey Butt in the [facility].    HPI and Plan:   See dictation    Orders Placed This Encounter   Procedures     Follow-Up with Cardiologist     EKG 12-lead complete w/read - Clinics       No orders of the defined types were placed in this encounter.      There are no discontinued medications.      Encounter Diagnoses   Name Primary?     PVC's (premature ventricular contractions)      Heart palpitations Yes       CURRENT MEDICATIONS:  Current Outpatient Medications   Medication Sig Dispense Refill     cetirizine (ZYRTEC) 10 MG tablet Take 10 mg by mouth as needed        fluticasone (FLONASE) 50 MCG/ACT spray Spray 1-2 sprays into both nostrils daily (Patient taking differently: Spray 1-2 sprays into both nostrils as needed ) 1 Bottle 0     metoprolol succinate (TOPROL-XL) 25 MG 24 hr tablet Take 1 tablet (25 mg) by mouth daily At bedtime 90 tablet 3     montelukast (SINGULAIR) 10 MG tablet Take 1 tablet (10 mg) by mouth At Bedtime (Patient taking differently: Take 10 mg by mouth as needed ) 90 tablet 1     sertraline (ZOLOFT) 25 MG tablet Take 1 tablet (25 mg) by mouth daily Patient taking 1.5 tabs daily 135 tablet 1     azelastine (ASTELIN) 0.1 % spray Spray 1 spray into both nostrils 2 times daily (Patient not taking: Reported on 2/21/2019) 1 Bottle 3       ALLERGIES     Allergies   Allergen Reactions     Seasonal Allergies        PAST MEDICAL HISTORY:  Past Medical History:   Diagnosis Date     CARDIOVASCULAR SCREENING; LDL GOAL LESS THAN 160 7/18/2013     Family hx of colon cancer 7/18/2013     Family hx of melanoma 7/18/2013     Generalised anxiety disorder 7/18/2013     Heart palpitations      Mild major depression (H) 7/18/2013       PAST SURGICAL HISTORY:  Past Surgical History:   Procedure Laterality Date      APPENDECTOMY  2007     HERNIA REPAIR         FAMILY HISTORY:  Family History   Problem Relation Age of Onset     Cancer Mother         melanoma       SOCIAL HISTORY:  Social History     Socioeconomic History     Marital status:      Spouse name: None     Number of children: None     Years of education: None     Highest education level: None   Occupational History     None   Social Needs     Financial resource strain: None     Food insecurity:     Worry: None     Inability: None     Transportation needs:     Medical: None     Non-medical: None   Tobacco Use     Smoking status: Never Smoker     Smokeless tobacco: Never Used   Substance and Sexual Activity     Alcohol use: Yes     Comment: five bottles of beer per week     Drug use: No     Sexual activity: Yes     Partners: Female   Lifestyle     Physical activity:     Days per week: None     Minutes per session: None     Stress: None   Relationships     Social connections:     Talks on phone: None     Gets together: None     Attends Church service: None     Active member of club or organization: None     Attends meetings of clubs or organizations: None     Relationship status: None     Intimate partner violence:     Fear of current or ex partner: None     Emotionally abused: None     Physically abused: None     Forced sexual activity: None   Other Topics Concern     Parent/sibling w/ CABG, MI or angioplasty before 65F 55M? No   Social History Narrative     None       Review of Systems:  Skin:  Negative       Eyes:  Positive for glasses    ENT:  Negative      Respiratory:  Negative       Cardiovascular:  Negative      Gastroenterology: Negative      Genitourinary:  Negative      Musculoskeletal:  Negative      Neurologic:  Negative      Psychiatric:  Positive for depression    Heme/Lymph/Imm:  Negative      Endocrine:  Negative        Physical Exam:  Vitals: /68 (BP Location: Right arm, Patient Position: Chair, Cuff Size: Adult Large)   Pulse 60   Ht  "1.854 m (6' 1\")   Wt 95.2 kg (209 lb 14.4 oz)   BMI 27.69 kg/m       Constitutional:  cooperative, alert and oriented, well developed, well nourished, in no acute distress        Skin:  warm and dry to the touch, no apparent skin lesions or masses noted          Head:  normocephalic, no masses or lesions        Eyes:  pupils equal and round, conjunctivae and lids unremarkable, sclera white, no xanthalasma, EOMS intact, no nystagmus        Lymph:      ENT:  no pallor or cyanosis        Neck:  carotid pulses are full and equal bilaterally, JVP normal, no carotid bruit        Respiratory:  normal breath sounds, clear to auscultation, normal A-P diameter, normal symmetry, normal respiratory excursion, no use of accessory muscles         Cardiac: regular rhythm;normal S1 and S2;no S3 or S4;apical impulse not displaced;no murmurs, gallops or rubs detected                pulses full and equal                                        GI:  not assessed this visit        Extremities and Muscular Skeletal:  no deformities, clubbing, cyanosis, erythema observed;no edema              Neurological:  no gross motor deficits;affect appropriate        Psych:  Alert and Oriented x 3        CC  KIM Kerr CNP  [address]  [place] [address]                Thank you for allowing me to participate in the care of your patient.      Sincerely,     Ángel Amaro MD, MD     [facility]    cc:   KIM Kerr CNP  [address]  [place] [address]        "

## 2019-02-21 NOTE — LETTER
2/21/2019      Eduardo Mckinnon PA-C  [address] [facility] [address]      RE: Rickey PRESSLEY Daquan       Dear Colleague,    I had the pleasure of seeing Rickey Butt in the [facility].    Service Date: 02/21/2019      REFERRING PHYSICIAN:  Eduardo Mckinnon PA-C.       HISTORY OF PRESENT ILLNESS:  It was my pleasure to see your patient, Rickey Butt.  He is an extremely pleasant man with a history of palpitations due to premature ventricular contractions.  As part of his workup, he had an echocardiogram which essentially shows a structurally normal heart and he underwent a stress echocardiogram which showed no evidence of ischemia or inducible arrhythmias.  The Holter monitor did show that he had frequent PVCs.  He was having 6340 PVCs in a 24-hour period. That is 6% of QRS complexes.  He had 1 ventricular run which was 7 beats long.  He was placed on low-dose metoprolol succinate 25 mg at nighttime and he also cut back significantly on his caffeine intake.  If you remember, he was drinking 5 cups of coffee a morning.  Also, if you remember his magnesium was normal, his TSH was normal, and his electrolytes were normal as well.  Since November, he has really had no palpitations at all, so things are going well for him.  Given the frequency of his PVCs as 6% of the total complexes, he is not at risk of developing a rate-related cardiomyopathy due to his frequent PVCs.  He is having no side effects from the metoprolol that he knows of.  His blood pressure is well controlled today at 112/68.  His pulse is 60 beats per minute.      IMPRESSION:  Palpitations.  These have been effectively suppressed by metoprolol and by greatly reducing his caffeine intake.  There is no evidence of structural heart disease or ischemia.      PLAN:  We will continue on his present regimen, but we will see him again in 6 months' time.  He was wondering if he could stop the metoprolol medication  possibly at that time and I explained to him everybody is different.  Some people would have rebound of their PVCs, other people will do well simply by eliminating caffeine from their diet, so he will see how he does at that time.      As always, he has been told to contact me if he has any questions or any concerns.      cc:   [place] [address]         ÁNGEL WILSON MD, [facility]             D: 2019   T: 2019   MT: CALEB      Name:     LEIGH COOL   MRN:      [id number]        Account:      [id number]   :      1975           Service Date: 2019      Document: [id number]         Outpatient Encounter Medications as of 2019   Medication Sig Dispense Refill     cetirizine (ZYRTEC) 10 MG tablet Take 10 mg by mouth as needed        fluticasone (FLONASE) 50 MCG/ACT spray Spray 1-2 sprays into both nostrils daily (Patient taking differently: Spray 1-2 sprays into both nostrils as needed ) 1 Bottle 0     metoprolol succinate (TOPROL-XL) 25 MG 24 hr tablet Take 1 tablet (25 mg) by mouth daily At bedtime 90 tablet 3     montelukast (SINGULAIR) 10 MG tablet Take 1 tablet (10 mg) by mouth At Bedtime (Patient taking differently: Take 10 mg by mouth as needed ) 90 tablet 1     [DISCONTINUED] sertraline (ZOLOFT) 25 MG tablet Take 1 tablet (25 mg) by mouth daily Patient taking 1.5 tabs daily 135 tablet 1     azelastine (ASTELIN) 0.1 % spray Spray 1 spray into both nostrils 2 times daily 1 Bottle 3     No facility-administered encounter medications on file as of 2019.        Again, thank you for allowing me to participate in the care of your patient.      Sincerely,    Ángel Amaro MD, MD     [facility]

## 2019-02-27 ENCOUNTER — OFFICE VISIT (OUTPATIENT)
Dept: FAMILY MEDICINE | Facility: CLINIC | Age: 44
End: 2019-02-27
Payer: COMMERCIAL

## 2019-02-27 VITALS
SYSTOLIC BLOOD PRESSURE: 129 MMHG | RESPIRATION RATE: 14 BRPM | TEMPERATURE: 98.4 F | WEIGHT: 209 LBS | BODY MASS INDEX: 27.57 KG/M2 | DIASTOLIC BLOOD PRESSURE: 79 MMHG | HEART RATE: 64 BPM

## 2019-02-27 DIAGNOSIS — F41.1 GENERALIZED ANXIETY DISORDER: ICD-10-CM

## 2019-02-27 DIAGNOSIS — I47.29 PAROXYSMAL VENTRICULAR TACHYCARDIA (H): ICD-10-CM

## 2019-02-27 DIAGNOSIS — Z13.6 CARDIOVASCULAR SCREENING; LDL GOAL LESS THAN 160: ICD-10-CM

## 2019-02-27 DIAGNOSIS — F32.0 MILD MAJOR DEPRESSION (H): Primary | ICD-10-CM

## 2019-02-27 DIAGNOSIS — Z13.1 SCREENING FOR DIABETES MELLITUS: ICD-10-CM

## 2019-02-27 PROBLEM — I47.20 PAROXYSMAL VENTRICULAR TACHYCARDIA (H): Status: ACTIVE | Noted: 2019-02-27

## 2019-02-27 LAB
CHOLEST SERPL-MCNC: 175 MG/DL
GLUCOSE SERPL-MCNC: 90 MG/DL (ref 70–99)
HDLC SERPL-MCNC: 46 MG/DL
LDLC SERPL CALC-MCNC: 109 MG/DL
NONHDLC SERPL-MCNC: 129 MG/DL
TRIGL SERPL-MCNC: 99 MG/DL

## 2019-02-27 PROCEDURE — 82947 ASSAY GLUCOSE BLOOD QUANT: CPT | Performed by: PHYSICIAN ASSISTANT

## 2019-02-27 PROCEDURE — 80061 LIPID PANEL: CPT | Performed by: PHYSICIAN ASSISTANT

## 2019-02-27 PROCEDURE — 36415 COLL VENOUS BLD VENIPUNCTURE: CPT | Performed by: PHYSICIAN ASSISTANT

## 2019-02-27 PROCEDURE — 99213 OFFICE O/P EST LOW 20 MIN: CPT | Performed by: PHYSICIAN ASSISTANT

## 2019-02-27 RX ORDER — SERTRALINE HYDROCHLORIDE 25 MG/1
25 TABLET, FILM COATED ORAL DAILY
Qty: 135 TABLET | Refills: 1 | Status: SHIPPED | OUTPATIENT
Start: 2019-02-27 | End: 2019-08-21

## 2019-02-27 ASSESSMENT — ANXIETY QUESTIONNAIRES
7. FEELING AFRAID AS IF SOMETHING AWFUL MIGHT HAPPEN: NOT AT ALL
GAD7 TOTAL SCORE: 1
1. FEELING NERVOUS, ANXIOUS, OR ON EDGE: NOT AT ALL
GAD7 TOTAL SCORE: 1
7. FEELING AFRAID AS IF SOMETHING AWFUL MIGHT HAPPEN: NOT AT ALL
3. WORRYING TOO MUCH ABOUT DIFFERENT THINGS: NOT AT ALL
6. BECOMING EASILY ANNOYED OR IRRITABLE: SEVERAL DAYS
2. NOT BEING ABLE TO STOP OR CONTROL WORRYING: NOT AT ALL
4. TROUBLE RELAXING: NOT AT ALL
GAD7 TOTAL SCORE: 1
5. BEING SO RESTLESS THAT IT IS HARD TO SIT STILL: NOT AT ALL

## 2019-02-27 ASSESSMENT — PATIENT HEALTH QUESTIONNAIRE - PHQ9
SUM OF ALL RESPONSES TO PHQ QUESTIONS 1-9: 0
SUM OF ALL RESPONSES TO PHQ QUESTIONS 1-9: 0
10. IF YOU CHECKED OFF ANY PROBLEMS, HOW DIFFICULT HAVE THESE PROBLEMS MADE IT FOR YOU TO DO YOUR WORK, TAKE CARE OF THINGS AT HOME, OR GET ALONG WITH OTHER PEOPLE: NOT DIFFICULT AT ALL

## 2019-02-27 NOTE — PROGRESS NOTES
SUBJECTIVE:   Rickey Butt is a 43 year old male who presents to clinic today for the following health issues:      History of Present Illness     Depression & Anxiety Follow-up:     Depression/Anxiety:  Depression only    Status since last visit::  Stable    Other associated symptoms of depression::  None    Significant life event::  No    Current substance use::  Alcohol and Prescription Drugs    Anxiety/Panic symptoms::  No       Today's PHQ-9         PHQ-9 Total Score:     (P) 0   PHQ-9 Q9 Suicidal ideation:   (P) Not at all   Thoughts of suicide or self harm:      Self-harm Plan:        Self-harm Action:          Safety concerns for self or others:       JENIFER-7 Total Score: (P) 1    Diet:  Other  Frequency of exercise:  None  Taking medications regularly:  Yes  Medication side effects:  None  Additional concerns today:  No    Patient also requesting fasting glucose and lipids. Lipids last checked 2013. Glucose on bmp non fasting in sept of 2018 was 90.       Problem list and histories reviewed & adjusted, as indicated.  Additional history: as documented      Patient Active Problem List   Diagnosis     CARDIOVASCULAR SCREENING; LDL GOAL LESS THAN 160     Family hx of colon cancer     Family hx of melanoma     Mild major depression (H)     Generalized anxiety disorder     Seasonal allergic rhinitis     Heart palpitations     Paroxysmal ventricular tachycardia (H)     Past Surgical History:   Procedure Laterality Date     APPENDECTOMY  2007     HERNIA REPAIR         Social History     Tobacco Use     Smoking status: Never Smoker     Smokeless tobacco: Never Used   Substance Use Topics     Alcohol use: Yes     Comment: five bottles of beer per week     Family History   Problem Relation Age of Onset     Cancer Mother         melanoma         Current Outpatient Medications   Medication Sig Dispense Refill     azelastine (ASTELIN) 0.1 % spray Spray 1 spray into both nostrils 2 times daily 1 Bottle 3      cetirizine (ZYRTEC) 10 MG tablet Take 10 mg by mouth as needed        fluticasone (FLONASE) 50 MCG/ACT spray Spray 1-2 sprays into both nostrils daily (Patient taking differently: Spray 1-2 sprays into both nostrils as needed ) 1 Bottle 0     metoprolol succinate (TOPROL-XL) 25 MG 24 hr tablet Take 1 tablet (25 mg) by mouth daily At bedtime 90 tablet 3     montelukast (SINGULAIR) 10 MG tablet Take 1 tablet (10 mg) by mouth At Bedtime (Patient taking differently: Take 10 mg by mouth as needed ) 90 tablet 1     sertraline (ZOLOFT) 25 MG tablet Take 1 tablet (25 mg) by mouth daily Patient taking 1.5 tabs daily 135 tablet 1     Allergies   Allergen Reactions     Seasonal Allergies      BP Readings from Last 3 Encounters:   02/27/19 129/79   02/21/19 112/68   10/30/18 112/68    Wt Readings from Last 3 Encounters:   02/27/19 94.8 kg (209 lb)   02/21/19 95.2 kg (209 lb 14.4 oz)   10/30/18 93.4 kg (206 lb)                    ROS:  Constitutional, HEENT, psych, neuro, cardiovascular, pulmonary, gi and gu systems are negative, except as otherwise noted.    OBJECTIVE:     /79 (BP Location: Right arm, Patient Position: Chair, Cuff Size: Adult Regular)   Pulse 64   Temp 98.4  F (36.9  C) (Oral)   Resp 14   Wt 94.8 kg (209 lb)   BMI 27.57 kg/m    Body mass index is 27.57 kg/m .  GENERAL: healthy, alert and no distress  RESP: lungs clear to auscultation - no rales, rhonchi or wheezes  CV: regular rates and rhythm, normal S1 S2, no S3 or S4 and no murmur, click or rub  PSYCH: mentation appears normal, affect normal/bright    Diagnostic Test Results:  none     ASSESSMENT/PLAN:     (F32.0) Mild major depression (H)  (primary encounter diagnosis)  Comment: stable. Follow up 6 month evisit.   Plan: sertraline (ZOLOFT) 25 MG tablet        -Medication use and side effects discussed with the patient. Patient is in complete understanding and agreement with plan.       (F41.1) Generalized anxiety disorder  Comment: as above    Plan: sertraline (ZOLOFT) 25 MG tablet            (Z13.6) CARDIOVASCULAR SCREENING; LDL GOAL LESS THAN 160  Comment:   Plan: Lipid panel reflex to direct LDL Fasting            (Z13.1) Screening for diabetes mellitus  Comment:   Plan: Glucose            (I47.2) Paroxysmal ventricular tachycardia (H)  Comment: followed by cardiology. Managed with metoprolol/rate control   Plan:     Follow up: as above     Eduardo Mckinnon PA-C  [facility]  Answers for HPI/ROS submitted by the patient on 2/27/2019   Chronic problems general questions HPI Form  If you checked off any problems, how difficult have these problems made it for you to do your work, take care of things at home, or get along with other people?: Not difficult at all  PHQ9 TOTAL SCORE: 0  JENIFER 7 TOTAL SCORE: 1

## 2019-02-28 ASSESSMENT — PATIENT HEALTH QUESTIONNAIRE - PHQ9: SUM OF ALL RESPONSES TO PHQ QUESTIONS 1-9: 0

## 2019-02-28 ASSESSMENT — ANXIETY QUESTIONNAIRES: GAD7 TOTAL SCORE: 1

## 2019-04-29 ENCOUNTER — OFFICE VISIT (OUTPATIENT)
Dept: SLEEP MEDICINE | Facility: CLINIC | Age: 44
End: 2019-04-29
Payer: COMMERCIAL

## 2019-04-29 VITALS
HEART RATE: 52 BPM | DIASTOLIC BLOOD PRESSURE: 74 MMHG | WEIGHT: 204.2 LBS | BODY MASS INDEX: 27.06 KG/M2 | OXYGEN SATURATION: 96 % | SYSTOLIC BLOOD PRESSURE: 113 MMHG | HEIGHT: 73 IN

## 2019-04-29 DIAGNOSIS — G47.9 SLEEP DISTURBANCE: Primary | ICD-10-CM

## 2019-04-29 PROCEDURE — 99214 OFFICE O/P EST MOD 30 MIN: CPT | Performed by: INTERNAL MEDICINE

## 2019-04-29 ASSESSMENT — MIFFLIN-ST. JEOR: SCORE: 1880

## 2019-04-29 NOTE — PROGRESS NOTES
[facility]  Outpatient Sleep Medicine Consultation  April 29, 2019      Name: Rickey Butt MRN# [id number]   Age: 43 year old YOB: 1975     Date of Consultation: April 29, 2019  Consultation is requested by: Elissa Roberts MD  No address on file  Primary care provider: Eduardo Mckinnon  [place] clinic: [facility]       Reason for Sleep Consult:     Rickey Butt is a 43 year old male nightly witnessed apnea, snoring, gasping, poor quality of sleep and excessive daytime sleepiness and tiredness.         Assessment and Plan:     Summary Sleep Diagnoses/Recommendations:    1. Sleep Disturbance:  High suspicion of sleep disordered breathing based on patient's symptoms (snoring, excessive daytime sleepiness, witnessed apneas), high BMI, neck circumference and oropharyngeal examination. Will schedule Home sleep study. We also discussed the pathophysiology of sleep disordered breathing and the importance of treating it if S/he should have it. Patient is advised not to drive if he/she feels drowsy or sleepy. Hand out was given to the patient.  Follow up after sleep study to discuss the result of sleep study and treatment options.    2.  No restorative sleep and insufficient sleep related to above.        Orders Placed This Encounter   Procedures     HST-Home Sleep Apnea Test       Summary Counseling:  See instructions    Counseling included a comprehensive review of diagnostic and therapeutic strategies as well as risks of inadequate therapy.  Educational materials provided in instructions.    All questions were answered.  The patient indicates understanding of the above issues and agrees with the plan set forth.           History of Present Illness:     Rickey Butt is a 43 year old male with history of premature ventricular contractures, depression, generalized anxiety disorder and palpitations who presents to the [facility] in [place] with  complains of sleep disturbance.  Patient complaints snoring, witnessed apnea, poor sleep quality as per wife, excessive daytime sleepiness and tiredness, insufficient sleep, no refreshing, no restorative sleep, dry mouth and throat.  He denies waking up gasping or choking, morning headaches, acid reflux, restless legs, grinding his teeth, difficulty falling asleep and staying asleep. He was recently diagnosed premature ventricular contractures with palpitations and improved with Metoprolol. His TSH was normal.    Please see below for sleep ROS details.    PREVIOUS IN- LAB or HOME SLEEP STUDIES:  None     SLEEP-WAKE SCHEDULE:     Rickey Butt     -Describes themself as neither a morning or night person;      -ON WEEKDAYS, goes to sleep at 11:00 PM during the week; awakens  5:45 AM with an alarm; falls asleep in 30 minutes; denies difficulty falling asleep.     -ON WEEKENDS, goes to sleep at 11:00 PM and wakes up at 7:00 AM without an alarm; falls asleep in 30 minutes.       -Awakens 1 time a night for 30 minutes before falling back to sleep; awakens to uncertain reasons.      -Total sleep time: 6-7 hours per night.    -Naps 0 times/days per week ; takes no inadvertant naps.       BEDTIME ACTIVITIES AND SHIFT WORK:    Rickey Butt    -does use electronics in bed and does not watch TV in bed and read in bed.     -does not do shift work.  He/she works day shifts.      Occupation:      [employer]       SLEEP APNEA: [employer]ang score: 5       INSOMNIA:  Insomnia severity score: 7       SLEEPINESS: Allardt sleepiness scale (ESS): 8   Drowsy driving/near accidents: No          PHQ9: N/A    SLEEP COMPLAINTS:   Snoring- 7 days/week  Witness apnea: Yes  Gasping/Choking: No  Excessive daytime sleep: Yes/No  Toss/turn: Yes/No  Excessive tiredness/fatigue:  Yes  Morning headaches: No  Dry mouth/throat: Yes  Dyspnea: No  Coexisting Lung disease: No    Coexisting Heart disease: No    Does patient have a bed  partner: Yes  Has bed partner been sleeping separately because of snoring:  No            RLS Screen: When you try to relax in the evening or sleep at  night, do you ever have unpleasant, restless feelings in your  legs that can be relieved by walking or movement? No    Periodic limb movement: No    Narcolepsy:       denies sudden urges of sleep attacks     denies cataplexy     denies sleep paralysis      denies hallucinations     Sleep Behaviors:     denies leg symptoms/movements     denies motor restlessness     denies night terrors     denies bruxism     denies automatic behaviors    Other subjective complaints:     denies anxiety or rumination      denies pain and discomfort at  night     denies waking up with heart pounding or racing     denies GERD/heartburn         Parasomnia:   NREM - denies recurrent persistent confusional arousal, night eating, sleep walking or sleep terrors   REM  - denies dream enactment; no injuries.     Safety: None             Medications:     Current Outpatient Medications   Medication Sig     azelastine (ASTELIN) 0.1 % spray Spray 1 spray into both nostrils 2 times daily     cetirizine (ZYRTEC) 10 MG tablet Take 10 mg by mouth as needed      fluticasone (FLONASE) 50 MCG/ACT spray Spray 1-2 sprays into both nostrils daily (Patient taking differently: Spray 1-2 sprays into both nostrils as needed )     metoprolol succinate (TOPROL-XL) 25 MG 24 hr tablet Take 1 tablet (25 mg) by mouth daily At bedtime     montelukast (SINGULAIR) 10 MG tablet Take 1 tablet (10 mg) by mouth At Bedtime (Patient taking differently: Take 10 mg by mouth as needed )     sertraline (ZOLOFT) 25 MG tablet Take 1 tablet (25 mg) by mouth daily Patient taking 1.5 tabs daily     No current facility-administered medications for this visit.         Medication that can affect sleep: Zoloft    Allergies   Allergen Reactions     Seasonal Allergies             Past Medical History:     Does not need 02 supplement at night  "    Past Medical History:   Diagnosis Date     CARDIOVASCULAR SCREENING; LDL GOAL LESS THAN 160 7/18/2013     Family hx of colon cancer 7/18/2013     Family hx of melanoma 7/18/2013     Generalised anxiety disorder 7/18/2013     Heart palpitations      Mild major depression (H) 7/18/2013               Past Surgical History:    No previous upper airway surgery     Past Surgical History:   Procedure Laterality Date     APPENDECTOMY  2007     HERNIA REPAIR              Social History:     Social History     Tobacco Use     Smoking status: Never Smoker     Smokeless tobacco: Never Used   Substance Use Topics     Alcohol use: Yes     Comment: five bottles of beer per week         Chemical History:     Tobacco: No     Uses 1 cups/day of coffee. Last caffeine intake is usually before 10 am    EtOH: Yes, one beer a day  Recreational Drugs: No    Psych Hx:   Depression and generalized anxiety disorder    Current dangers to self or others: None           Family History:     Family History   Problem Relation Age of Onset     Cancer Mother         melanoma        Sleep Family Hx:        RLS- No  GOVIND - No  Insomnia - No  Parasomnia - No         Review of Systems:     A complete 10 point review of systems was negative other than HPI or as commented below:   Patient denies chest pain, dyspnea with activity and or rest, wheezing, abdominal pain, n&v, fever, chills, dysuria, leg pain or swelling. Patient is also denies ear pain, sore throat, postnasal drip, running nose, dry cough.      Rickey Butt has gained 0-5 pounds in the last year.            Physical Examination:   /74   Pulse 52   Ht 1.854 m (6' 0.99\")   Wt 92.6 kg (204 lb 3.2 oz)   SpO2 96%   BMI 26.95 kg/m       Neck Circumference: 40 cm   Constitutional: . Awake, alert, cooperative, in no apparent distress  Mood: euthymic; affect congruent with full range and intensity.  Attention/Concentration:  Normal   Eyes: Pupils round and reactive. No " icterus.  ENT: Mallampati Class: IV.   Tonsillar Stage: 1  hidden by pillars  Clear nasal passages. Enlarged inferior turbinates. No deviated septum.  Oropharynx: No high arched palate. Mild pharyngeal erythema but no exudates, elongated uvula. No lateral narrowing  Tongue:  relative macroglossia   Dentition: Good.  Dentures: None  Neck: Supple, no thyroid enlargement.   Cardiovascular: Regular S1 and S2, no murmurs or gallops.    Pulmonary:  Chest symmetric, lungs reveal decreased breath sounds at bases. No rales or wheezes.  Abdomen: Soft, obese, non tender.  Extremities:  No pedal edema.  Muscle/joint: Strength and tone normal   Skin:  No rash or significant lesions examined areas of skin.   Neurologic: Alert, oriented x3, no focal neurological deficit.           Data: All pertinent previous laboratory data reviewed     No results found for: PH, PHARTERIAL, PO2, [id number], SAT, PCO2, HCO3, BASEEXCESS, CHRISTINA, BEB  Lab Results   Component Value Date    TSH 0.56 09/11/2018    TSH 0.86 10/19/2015     Lab Results   Component Value Date    GLC 90 02/27/2019    GLC 90 09/09/2018     Lab Results   Component Value Date    HGB 14.9 07/18/2013     Lab Results   Component Value Date    BUN 12 09/09/2018    BUN 14 07/18/2013    CR 0.91 09/09/2018    CR 0.86 07/18/2013     Lab Results   Component Value Date    CO2 25 09/09/2018    CO2 27 07/18/2013     No results found for: PAULO      Echocardiography: 9/11/2018  Interpretation Summary     The visual ejection fraction is estimated at 60-65%.  The right ventricle is normal in size and function.  Sinus rhythm was noted.    Chest x-ray: No    PFT: No        Copy to: Eduardo Mckinnon  Copy to: Referred Self      Sergio Grimm MD 4/29/2019   [facility] [place] [address], [place] [address]   [phone] Clinic    Total time spent with patient: 31 minutes with this patient today in which 25 minutes was spent in counseling/coordination of care and  going over planned testing and recommendations.

## 2019-04-29 NOTE — PATIENT INSTRUCTIONS
"MY TREATMENT INFORMATION FOR SLEEP DISTURBANCE-  Rickey Butt    DOCTOR : Sergio Grimm MD  SLEEP CENTER :  [facility]    MY CONTACT NUMBER: [phone]        If I haven't had a sleep study yet, what can I expect?  A personal story from Félix  https://www.[employer].com/watch?v=AxPLmlRpnCs    Am I having a home sleep study?  Here is a video in case you get home and want to make sure you have done it correctly  https://www.[employer].com/watch?v=HLI0Z4eVlp8&feature=youtu.be    Suspected sleep apnea: Sleep study ordered.    Follow up in sleep clinic 1-2 weeks after sleep study to discuss results of sleep study and treatment options.    Patient was advised not to drive if drowsy or sleepy.    Frequently asked questions:  1. What is Obstructive Sleep Apnea (GOVIND)? GOVIND is the most common type of sleep apnea. Apnea literally means, \"without breath.\" It is characterized by repetitive pauses in breathing, despite continued effort to breathe, and is usually associated with a reduction in blood oxygen saturation. Apneas can last 10 to over 60 seconds. It is caused by narrowing or collapse of the upper airway as muscles relax during sleep. Severity of sleep apnea is determined by frequency of breathing events and their effect on your sleep and oxygen levels determined during sleep testing.   2. What are the consequences of GOVIND? Symptoms include: daytime sleepiness- possibly increasing the risk of falling asleep while driving, unrefreshing/restless sleep, snoring, insomnia, waking frequently to urinate, waking with heartburn or reflux, reduced concentration and memory, and morning headaches. Other health consequences may include development of high blood pressure and other cardiovascular disease in persons who are susceptible. Untreated GOVIND  can contribute to heart disease, stroke and diabetes.   3. What are the treatment options? In most situations, sleep apnea is a lifelong disease that must be managed with daily " therapy. Medications are not effective for sleep apnea and surgery is generally not performed until other therapies have been tried. Therapy is usually tailored to the individual patient based on many factors including your wishes as well as severity of sleep apnea and severity of obesity. Continuous Positive Airway (CPAP) is the most reliable treatment. An oral device to hold your jaw forward is usually the next most reliable option. Other options include postioning devices (to keep you off your back), weight loss, and surgery including a tongue pacing device. There is more detail about some of these options below.    Central sleep apnea is a disorder in which your breathing repeatedly stops and starts during sleep.  Central sleep apnea occurs because your brain doesn't send proper signals to the muscles that control your breathing. This condition is different from obstructive sleep apnea, in which you can't breathe normally because of upper airway obstruction. Central sleep apnea is less common than obstructive sleep apnea.  Central sleep apnea may occur as a result of other conditions, such as heart failure and stroke. Sleeping at a high altitude and pain medications also may cause central sleep apnea.        1. CPAP-  WHAT DOES IT DO AND HOW CAN I LEARN TO WEAR IT?                               BEFORE I START, CAN I WATCH A MOVIE TO GET A PLAN ON HOW TO USE CPAP?  https://www.[employer].com/watch?t=r4D40qc177R      Continuous positive airway pressure, or CPAP, is the most effective treatment for obstructive sleep apnea. It works by blowing room air, through a mask, to hold your throat open. A decision to use CPAP is a major step forward in the pursuit of a healthier life. The successful use of CPAP will help you breathe easier, sleep better and live healthier. You can choose CPAP equipment from any durable medical equipment provider that meets your needs.  Using CPAP can be a positive experience if you keep these  "key points in mind:  1. Commitment  CPAP is not a quick fix for your problem. It involves a long-term commitment to improve your sleep and your health.    2. Communication  Stay in close communication with both your sleep doctor and your CPAP supplier. Ask lots of questions and seek help when you need it.    3. Consistency  Use CPAP all night, every night and for every nap. You will receive the maximum health benefits from CPAP when you use it every time that you sleep. This will also make it easier for your body to adjust to the treatment.    4. Correction  The first machine and mask that you try may not be the best ones for you. Work with your sleep doctor and your CPAP supplier to make corrections to your equipment selection. Ask about trying a different type of machine or mask if you have ongoing problems. Make sure that your mask is a good fit and learn to use your equipment properly.    5. Challenge  Tell a family member or close friend to ask you each morning if you used your CPAP the previous night. Have someone to challenge you to give it your best effort.    6. Connection   Your adjustment to CPAP will be easier if you are able to connect with others who use the same treatment. Ask your sleep doctor if there is a support group in your area for people who have sleep apnea, or look for one on the Internet.  7. Comfort   Increase your level of comfort by using a saline spray, decongestant or heated humidifier if CPAP irritates your nose, mouth or throat. Use your unit's \"ramp\" setting to slowly get used to the air pressure level. There may be soft pads you can buy that will fit over your mask straps. Look on www.CPAP.com for accessories that can help make CPAP use more comfortable.  8. Cleaning   Clean your mask, tubing and headgear on a regular basis. Put this time in your schedule so that you don't forget to do it. Check and replace the filters for your CPAP unit and humidifier.    9. Completion   Although " you are never finished with CPAP therapy, you should reward yourself by celebrating the completion of your first month of treatment. Expect this first month to be your hardest period of adjustment. It will involve some trial and error as you find the machine, mask and pressure settings that are right for you.    10. Continuation  After your first month of treatment, continue to make a daily commitment to use your CPAP all night, every night and for every nap.    CPAP-Tips to starting with success:  Begin using your CPAP for short periods of time during the day while you watch TV or read.    Use CPAP every night and for every nap. Using it less often reduces the health benefits and makes it harder for your body to get used to it.    Make small adjustments to your mask, tubing, straps and headgear until you get the right fit. Tightening the mask may actually worsen the leak.  If it leaves significant marks on your face or irritates the bridge of your nose, it may not be the best mask for you.  Speak with the person who supplied the mask and consider trying other masks. Insurances will allow you to try different masks during the first month of starting CPAP.  Insurance also covers a new mask, hose and filter about every 6 months.    Use a saline nasal spray to ease mild nasal congestion. Neti-Pot or saline nasal rinses may also help. Nasal gel sprays can help reduce nasal dryness.  Biotene mouthwash can be helpful to protect your teeth if you experience frequent dry mouth.  Dry mouth may be a sign of air escaping out of your mouth or out of the mask in the case of a full face mask.  Speak with your provider if you expect that is the case.     Take a nasal decongestant to relieve more severe nasal or sinus congestion.  Do not use Afrin (oxymetazoline) nasal spray more than 3 days in a row.  Speak with your sleep doctor if your nasal congestion is chronic.    Use a heated humidifier that fits your CPAP model to enhance  your breathing comfort. Adjust the heat setting up if you get a dry nose or throat, down if you get condensation in the hose or mask.  Position the CPAP lower than you so that any condensation in the hose drains back into the machine rather than towards the mask.    Try a system that uses nasal pillows if traditional masks give you problems.    Clean your mask, tubing and headgear once a week. Make sure the equipment dries fully.    Regularly check and replace the filters for your CPAP unit and humidifier.    Work closely with your sleep provider and your CPAP supplier to make sure that you have the machine, mask and air pressure setting that works best for you. It is better to stop using it and call your provider to solve problems than to lay awake all night frustrated with the device.    BESIDES CPAP, WHAT OTHER THERAPIES ARE THERE?      Positioning Device  Positioning devices are generally used when sleep apnea is mild and only occurs on your back.This example shows a pillow that straps around the waist. It may be appropriate for those whose sleep study shows milder sleep apnea that occurs primarily when lying flat on one's back. Preliminary studies have shown benefit but effectiveness at home may need to be verified by a home sleep test. These devices are generally not covered by medical insurance.                      Oral Appliance  What is oral appliance therapy?  An oral appliance is a small acrylic device that fits over the upper and lower teeth or tongue (similar to an orthodontic retainer or a mouth guard). This device slightly advances the lower jaw or tongue, which moves the base of the tongue forward, opens the airway, improves breathing and can effectively treat snoring and obstructive sleep apnea sleep apnea. The appliance is fabricated and customized by a qualified dentist with experience in treating snoring and sleep apnea. Oral appliances are usually well tolerated and have relatively high  compliance by patients1, 2, 3.  When is an oral appliance indicated?  Oral appliance therapy is recommended as a first-line treatment for patients with primary snoring, mild sleep apnea, and for patients with moderate sleep apnea who prefer appliance therapy to use of CPAP4, 5. Severity of sleep apnea is determined by sleep testing and is based on the number of respiratory events per hour of sleep.   How successful is oral appliance therapy?  The success rate of oral appliance therapy in patients with mild sleep apnea is 75-80% while in patients with moderate sleep apnea it is 50-70%. The chance of success in patients with severe sleep apnea is 40-50%. The research also shows that oral appliances have a beneficial effect on the cardiovascular health of GOVIND patients at the same magnitude as CPAP therapy7.  Oral appliances should be a second-line treatment in cases of severe sleep apnea, but if not completely successful then a combination therapy utilizing CPAP plus oral appliance therapy may be effective. Oral appliances tend to be effective in a broad range of patients although studies show that the patients who have the highest success are females, younger patients, those with milder disease, and less severe obesity. 3, 6.   The chances of success are lower in patients who have more severe GOVIND, are older, and those who are morbidly obese.     Example of an oral appliance   Finding a dentist that practices dental sleep medicine  Specific training is available through the American Academy of Dental Sleep Medicine for dentists interested in working in the field of sleep. To find a dentist who is educated in the field of sleep and the use of oral appliances, near you, visit the Web site of the American Academy of Dental Sleep Medicine; also see   http://www.accpstorage.org/newOrganization/patients/oralAppliances.pdf  To search for a dentist certified in these practices:  Http://aadsm.org/FindADentist.aspx?1  1.  Rama, et al. Objectively measured vs self-reported compliance during oral appliance therapy for sleep-disordered breathing. Chest 2013; 144(5): 9235-5791.  2. Tere, et al. Objective measurement of compliance during oral appliance therapy for sleep-disordered breathing. Thorax 2013; 68(1): 91-96.  3. Luis et al. Mandibular advancement devices in 620 men and women with GOVIND and snoring: tolerability and predictors of treatment success. Chest 2004; 125: 6679-2948.  4. Halima et al. Oral appliances for snoring and GOVIND: a review. Sleep 2006; 29: 244-262.  5. Yung et al. Oral appliance treatment for GOVIND: an update. J Clin Sleep Med 2014; 10(2): 215-227.  6. Trey et al. Predictors of OSAH treatment outcome. J Dent Res 2007; 86: 3570-7137.    Nasal Valves                 Nasal valves may not be effective if you have frequent nasal congestion or have difficulty breathing through your nose. They may be an option for mild apnea if other options are not well tolerated. The efficacy of these devices is generally less than CPAP or oral appliances.      Weight Loss:    Weight management is a personal decision.  If you are interested in exploring weight loss strategies, the following discussion covers the impact on weight loss on sleep apnea and the approaches that may be successful.    Weight loss decreases severity of sleep apnea in most people with obesity. For those with mild obesity who have developed snoring with weight gain, even 15-30 pound weight loss can improve and occasionally eliminate sleep apnea.  Structured and life-long dietary and health habits are necessary to lose weight and keep healthier weight levels.     Though there may be significant health benefits from weight loss, long-term weight loss is very difficult to achieve- studies show success with dietary management in less than 10% of people. In addition, substantial weight loss may require years of dietary control and may be  difficult if patients have severe obesity. In these cases, surgical management may be considered.  Finally, older individuals who have tolerated obesity without health complications may be less likely to benefit from weight loss strategies.    Your BMI is Body mass index is 26.95 kg/m .  Body mass index (BMI) is one way to tell whether you are at a healthy weight, overweight, or obese. It measures your weight in relation to your height.  A BMI of 18.5 to 24.9 is in the healthy range. A person with a BMI of 25 to 29.9 is considered overweight, and someone with a BMI of 30 or greater is considered obese. More than two-thirds of American adults are considered overweight or obese.  Being overweight or obese increases the risk for further weight gain. Excess weight may lead to heart disease and diabetes.  Creating and following plans for healthy eating and physical activity may help you improve your health.  Weight control is part of healthy lifestyle and includes exercise, emotional health, and healthy eating habits. Careful eating habits lifelong are the mainstay of weight control. Though there are significant health benefits from weight loss, long-term weight loss with diet alone may be very difficult to achieve- studies show long-term success with dietary management in less than 10% of people. Attaining a healthy weight may be especially difficult to achieve in those with severe obesity. In some cases, medications, devices and surgical management might be considered.  What can you do?  If you are overweight or obese and are interested in methods for weight loss, you should discuss this with your provider.     Consider reducing daily calorie intake by 500 calories.     Keep a food journal.     Avoiding skipping meals, consider cutting portions instead.    Diet combined with exercise helps maintain muscle while optimizing fat loss. Strength training is particularly important for building and maintaining muscle mass.  Exercise helps reduce stress, increase energy, and improves fitness. Increasing exercise without diet control, however, may not burn enough calories to loose weight.       Start walking three days a week 10-20 minutes at a time    Work towards walking thirty minutes five days a week     Eventually, increase the speed of your walking for 1-2 minutes at time    In addition, we recommend that you review healthy lifestyles and methods for weight loss available through the National Institutes of Health patient information sites:  http://win.niddk.nih.gov/publications/index.htm    And look into health and wellness programs that may be available through your health insurance provider, employer, local community center, or jonathan club.    Weight management plan: Patient was referred to their PCP to discuss a diet and exercise plan.    Surgery:    Upper Airway Surgery for GOVIND  Surgery for GOVIND is a second-line treatment option in the management of sleep apnea.  Surgery should be considered for patients who are having a difficult time tolerating CPAP.    Surgery for GOVIND is directed at areas that are responsible for narrowing or complete obstruction of the airway during sleep.  There are a wide range of procedures available to enlarge and/or stabilize the airway to prevent blockage of breathing in the three major areas where it can occur: the palate, tongue, and nasal regions.  Successful surgical treatment depends on the accurate identification of the factors responsible for obstructive sleep apnea in each person.  A personalized approach is required because there is no single treatment that works well for everyone.  Because of anatomic variation, consultation with an examination by a sleep surgeon is a critical first step in determining what surgical options are best for each patient.  In some cases, examination during sedation may be recommended in order to guide the selection of procedures.  Patients will be counseled about  risks and benefits as well as the typical recovery course after surgery. Surgery is typically not a cure for a person s GOVIND.  However, surgery will often significantly improve one s GOVIND severity (termed  success rate ).  Even in the absence of a cure, surgery will decrease the cardiovascular risk associated with OSA7; improve overall quality of life8 (sleepiness, functionality, sleep quality, etc).          Palate Procedures:  Patients with GOVIND often have narrowing of their airway in the region of their tonsils and uvula.  The goals of palate procedures are to widen the airway in this region as well as to help the tissues resist collapse.  Modern palate procedure techniques focus on tissue conservation and soft tissue rearrangement, rather than tissue removal.  Often the uvula is preserved in this procedure. Residual sleep apnea is common in patient after pharyngoplasty with an average reduction in sleep apnea events of 33%2.      Tongue Procedures:  While patients are awake, the muscles that surround the throat are active and keep this region open for breathing. These muscles relax during sleep, allowing the tongue and other structures to collapse and block breathing.  There are several different tongue procedures available.  Selection of a tongue base procedure depends on characteristics seen on physical exam.  Generally, procedures are aimed at removing bulky tissues in this area or preventing the back of the tongue from falling back during sleep.  Success rates for tongue surgery range from 50-62%3.    Hypoglossal Nerve Stimulation:  Hypoglossal nerve stimulation has recently received approval from the [place] Food and Drug Administration for the treatment of obstructive sleep apnea.  This is based on research showing that the system was safe and effective in treating sleep apnea6.  Results showed that the median AHI score decreased 68%, from 29.3 to 9.0. This therapy uses an implant system that senses  breathing patterns and delivers mild stimulation to airway muscles, which keeps the airway open during sleep.  The system consists of three fully implanted components: a small generator (similar in size to a pacemaker), a breathing sensor, and a stimulation lead.  Using a small handheld remote, a patient turns the therapy on before bed and off upon awakening.    Candidates for this device must be greater than 22 years of age, have moderate to severe GOVIND (AHI between 20-65), BMI less than 32, have tried CPAP/oral appliance without success, and have appropriate upper airway anatomy (determined by a sleep endoscopy performed by Dr. Lyn).    Hypoglossal Nerve Stimulation Pathway:    The sleep surgeon s office will work with the patient through the insurance prior-authorization process (including communications and appeals).    Nasal Procedures:  Nasal obstruction can interfere with nasal breathing during the day and night.  Studies have shown that relief of nasal obstruction can improve the ability of some patients to tolerate positive airway pressure therapy for obstructive sleep apnea1.  Treatment options include medications such as nasal saline, topical corticosteroid and antihistamine sprays, and oral medications such as antihistamines or decongestants. Non-surgical treatments can include external nasal dilators for selected patients. If these are not successful by themselves, surgery can improve the nasal airway either alone or in combination with these other options.      Combination Procedures:  Combination of surgical procedures and other treatments may be recommended, particularly if patients have more than one area of narrowing or persistent positional disease.  The success rate of combination surgery ranges from 66-80%2,3.      1. Jenna PRESSLEY. The Role of the Nose in Snoring and Obstructive Sleep Apnoea: An Update.  Eur Arch Otorhinolaryngol. 2011; 268: 1365-73.  2.  Christy DRAPER; Georgina JONES; Ashley SMITH;  Pallanch JF; Daria MB; Norma SG; Ye AGRAWAL. Surgical modifications of the upper airway for obstructive sleep apnea in adults: a systematic review and meta-analysis. SLEEP 2010;33(10):9163-1727. Moshe HOLCOMB. Hypopharyngeal surgery in obstructive sleep apnea: an evidence-based medicine review.  Arch Otolaryngol Head Neck Surg. 2006 Feb;132(2):206-13.  3. Nahid YH1, Mukesh Y, Khris YURIY. The efficacy of anatomically based multilevel surgery for obstructive sleep apnea. Otolaryngol Head Neck Surg. 2003 Oct;129(4):327-35.  4. Moshe HOLCOMB, Goldberg A. Hypopharyngeal Surgery in Obstructive Sleep Apnea: An Evidence-Based Medicine Review. Arch Otolaryngol Head Neck Surg. 2006 Feb;132(2):206-13.  5. Sander LESTER et al. Upper-Airway Stimulation for Obstructive Sleep Apnea.  N Engl J Med. 2014 Jan 9;370(2):139-49.  6. Lula Y et al. Increased Incidence of Cardiovascular Disease in Middle-aged Men with Obstructive Sleep Apnea. Am J Respir Crit Care Med; 2002 166: 159-165  7. Restrepo EM et al. Studying Life Effects and Effectiveness of Palatopharyngoplasty (SLEEP) study: Subjective Outcomes of Isolated Uvulopalatopharyngoplasty. Otolaryngol Head Neck Surg. 2011; 144: 623-631.  8.   Your blood pressure was checked while you were in clinic today.  Please read the guidelines below about what these numbers mean and what you should do about them.  Your systolic blood pressure is the top number.  This is the pressure when the heart is pumping.  Your diastolic blood pressure is the bottom number.  This is the pressure in between beats.  If your systolic blood pressure is less than 120 and your diastolic blood pressure is less than 80, then your blood pressure is normal. There is nothing more that you need to do about it  If your systolic blood pressure is 120-139 or your diastolic blood pressure is 80-89, your blood pressure may be higher than it should be.  You should have your blood pressure re-checked within a year by a primary care  provider.  If your systolic blood pressure is 140 or greater or your diastolic blood pressure is 90 or greater, you may have high blood pressure.  High blood pressure is treatable, but if left untreated over time it can put you at risk for heart attack, stroke, or kidney failure.  You should have your blood pressure re-checked by a primary care provider within the next four weeks.

## 2019-04-29 NOTE — NURSING NOTE
"Chief Complaint   Patient presents with     Consult     snores, witnessed apneas ,        Initial /74   Pulse 52   Ht 1.854 m (6' 0.99\")   Wt 92.6 kg (204 lb 3.2 oz)   SpO2 96%   BMI 26.95 kg/m   Estimated body mass index is 26.95 kg/m  as calculated from the following:    Height as of this encounter: 1.854 m (6' 0.99\").    Weight as of this encounter: 92.6 kg (204 lb 3.2 oz).    Medication Reconciliation: complete    Neck circumference: 15.75 inches / 40 centimeters.    DME: no    ESS 8    Rupal Ware, Sleep Clinic specialist  "

## 2019-05-02 ENCOUNTER — OFFICE VISIT (OUTPATIENT)
Dept: SLEEP MEDICINE | Facility: CLINIC | Age: 44
End: 2019-05-02
Payer: COMMERCIAL

## 2019-05-02 DIAGNOSIS — G47.9 SLEEP DISTURBANCE: ICD-10-CM

## 2019-05-02 PROCEDURE — G0399 HOME SLEEP TEST/TYPE 3 PORTA: HCPCS | Performed by: INTERNAL MEDICINE

## 2019-05-03 ENCOUNTER — DOCUMENTATION ONLY (OUTPATIENT)
Dept: SLEEP MEDICINE | Facility: CLINIC | Age: 44
End: 2019-05-03
Payer: COMMERCIAL

## 2019-05-03 NOTE — PROGRESS NOTES
This HSAT was performed using a Noxturnal T3 device which recorded snore, sound, movement activity, body position, nasal pressure, oronasal thermal airflow, pulse, oximetry and both chest and abdominal respiratory effort. HSAT data was restricted to the time patient states they were in bed.     HSAT was scored using 1B 4% hypopnea rule.     AHI: 5.7. Snoring was reported as moderate.  Time with SpO2 below 89% was 0.0 minutes.   Overall signal quality was good     Pt will follow up with sleep provider to determine appropriate therapy.     Ordering Alfa POLLARD Abdullahi I, MD Charles O. BA, RPS, RST System Clinical Specialist 05/3/2019

## 2019-05-06 NOTE — PROCEDURES
"  [facility] Study Report  ===========================    Patient Information:  --------------------  Rickey Butt  Patient ID:  [id number]   :  1975  Recording date:  2019       Indication of the sleep study: Rickey Butt is a 43 year old male with history of premature ventricular contractures, depression, generalized anxiety disorder and palpitations who was seen at the [facility] in [place] with complains of snoring, witnessed apnea, poor sleep quality as per wife, excessive daytime sleepiness and tiredness, insufficient sleep, no refreshing, no restorative sleep, dry mouth and throat. Ht 1.854 m (6' 0.99\")   Wt 92.6 kg (204 lb 3.2 oz)   SpO2 96%   BMI 26.95 kg/m .      Recording Information:  ----------------------  This was a Type 3 unattended sleep study (measuring flow, effort, heart rate and pulse oximetry) performed at home. Please refer to EPIC procedure for detailed scoring report.   This study was considered adequate based on > 4 hours of quality oximetry and respiratory recording. As specified by the AASM Manual for the Scoring of Sleep and Associated events, version 2.3, Rule VIII.D 1B, 4% oxygen desaturation scoring for hypopneas is used as a standard of care on all home sleep apnea testing.  The severity of sleep disordered breathing can be underestimated during portable testing because the apnea/hypopnea index is calculated using total recording time rather than total sleep time.  Recording date:  2019   Recording duration:  600.0 minutes  Time in bed:  399.2 minutes  Estimated sleep efficiency was 96.9 %.   Time spent in supine position:  100.0 % of total bed time  The test quality was: adequate for interpretation  The test duration was: adequate for interpretation  Respiratory Analysis:  ---------------------  AHI: 5.7 /hour  AHI (supine):  5.7 /hour  AHI (non-supine):  Non applicable.  WARD: 4.2 /hour (Number of oxygen desaturations per " hour)  Snore index: 4.9 (percentage of time spent snoring versus the total time spent in bed)  Central apnea index:  1.4 / hour    The sleep study demonstrated mild sleep disordered breathing which was characterized predominantly by obstructive apneas and hypopneas.     Oximetry Analysis:  ------------------  Baseline oxygen saturation during sleep was normal.   Lowest oxygen saturation:  89.0 %  Majority of the sleep time spent with oxygen saturation greater than 90%.   0.0% of the total recording time was spent with oxygen saturation less than 90%.   Time Spent oxygen saturation below 88% was 0 minutes.    Cardiac Analysis:  -----------------  Maximum pulse rate was 80.0 /minute and minimal pulse rate was 48.0/minute. Time spent above 100 bpm was 0.0 minutes and time spent below 40 bpm was 0.0 minutes.    Diagnosis:  ==========  Mild obstructive sleep apnea G47.33    Recommendations:   ================    1. Patient may be a candidate for dental appliance through referral to Sleep Dentistry for the treatment of obstructive sleep apnea and/or socially disruptive snoring.    2. If devices are not acceptable or effective, patient may benefit from evaluation of possible surgical options.  If he/she is interested, would recommend referral to specialized ENT-Sleep provider.    3. Patient may be a candidate for positional device as alternative therapy.    4. Recommend optimizing regular wake-sleep schedule and avoiding sleep deprivation.    Other Recommendations:  ======================  1. Start weight loss program if BMI > 25.    2. Avoid sedating medications, including narcotics and alcohol, as these may exacerbate sleep apnea and/or if underlying respiratory disorders.     3. Positional therapy by avoiding sleep in supine position.    4. Avoid driving when drowsy    5. Follow up primary care doctor and/or referring physician as scheduled.      Electronically signed by:      Sergio Grimm MD  Sleep Medicine  Physician  RICARDO Merinoomate, Sleep Medicine and Internal Medicine  [facility].

## 2019-05-15 ENCOUNTER — OFFICE VISIT (OUTPATIENT)
Dept: SLEEP MEDICINE | Facility: CLINIC | Age: 44
End: 2019-05-15
Payer: COMMERCIAL

## 2019-05-15 VITALS
SYSTOLIC BLOOD PRESSURE: 124 MMHG | WEIGHT: 204 LBS | HEART RATE: 60 BPM | DIASTOLIC BLOOD PRESSURE: 78 MMHG | BODY MASS INDEX: 27.04 KG/M2 | OXYGEN SATURATION: 95 % | HEIGHT: 73 IN

## 2019-05-15 DIAGNOSIS — G47.33 OSA (OBSTRUCTIVE SLEEP APNEA): Primary | ICD-10-CM

## 2019-05-15 PROCEDURE — 99213 OFFICE O/P EST LOW 20 MIN: CPT | Performed by: INTERNAL MEDICINE

## 2019-05-15 ASSESSMENT — MIFFLIN-ST. JEOR: SCORE: 1879.09

## 2019-05-15 NOTE — NURSING NOTE
"Chief Complaint   Patient presents with     RECHECK     F/U HST 5/2       Initial /78   Pulse 60   Ht 1.854 m (6' 0.99\")   Wt 92.5 kg (204 lb)   SpO2 95%   BMI 26.92 kg/m   Estimated body mass index is 26.92 kg/m  as calculated from the following:    Height as of this encounter: 1.854 m (6' 0.99\").    Weight as of this encounter: 92.5 kg (204 lb).    Medication Reconciliation: complete          "

## 2019-05-15 NOTE — PATIENT INSTRUCTIONS
"MY TREATMENT INFORMATION FOR SLEEP APNEA-  Rickey WENDIE Butt    MY CONTACT NUMBERS ARE:  [phone]  DOCTOR : Sergio MCKEON [facility]  SLEEP CENTER :  [facility]    You have sleep apnea/snoring: oral appliance is recommended for you.    Patient was advised not to drive if drowsy or sleepy.    Frequently asked questions:  1. What is Obstructive Sleep Apnea (GOVIND)? GOVIND is the most common type of sleep apnea. Apnea literally means, \"without breath.\" It is characterized by repetitive pauses in breathing, despite continued effort to breathe, and is usually associated with a reduction in blood oxygen saturation. Apneas can last 10 to over 60 seconds. It is caused by narrowing or collapse of the upper airway as muscles relax during sleep. Severity of sleep apnea is determined by frequency of breathing events and their effect on your sleep and oxygen levels determined during sleep testing.   2. What are the consequences of GOVIND? Symptoms include: daytime sleepiness- possibly increasing the risk of falling asleep while driving, unrefreshing/restless sleep, snoring, insomnia, waking frequently to urinate, waking with heartburn or reflux, reduced concentration and memory, and morning headaches. Other health consequences may include development of high blood pressure and other cardiovascular disease in persons who are susceptible. Untreated GOVIND  can contribute to heart disease, stroke and diabetes.   3. What are the treatment options? In most situations, sleep apnea is a lifelong disease that must be managed with daily therapy. Medications are not effective for sleep apnea and surgery is generally not performed until other therapies have been tried. Therapy is usually tailored to the individual patient based on many factors including your wishes as well as severity of sleep apnea and severity of obesity. Continuous Positive Airway (CPAP) is the most reliable treatment. An oral device to hold your jaw forward is usually    Oral " Appliance  What is oral appliance therapy?  An oral appliance is a small acrylic device that fits over the upper and lower teeth or tongue (similar to an orthodontic retainer or a mouth guard). This device slightly advances the lower jaw or tongue, which moves the base of the tongue forward, opens the airway, improves breathing and can effectively treat snoring and obstructive sleep apnea sleep apnea. The appliance is fabricated and customized by a qualified dentist with experience in treating snoring and sleep apnea. Oral appliances are usually well tolerated and have relatively high compliance by patients1, 2, 3.  When is an oral appliance indicated?  Oral appliance therapy is recommended as a first-line treatment for patients with primary snoring, mild sleep apnea, and for patients with moderate sleep apnea who prefer appliance therapy to use of CPAP4, 5. Severity of sleep apnea is determined by sleep testing and is based on the number of respiratory events per hour of sleep.   How successful is oral appliance therapy?  The success rate of oral appliance therapy in patients with mild sleep apnea is 75-80% while in patients with moderate sleep apnea it is 50-70%. The chance of success in patients with severe sleep apnea is 40-50%. The research also shows that oral appliances have a beneficial effect on the cardiovascular health of GOVIND patients at the same magnitude as CPAP therapy7.  Oral appliances should be a second-line treatment in cases of severe sleep apnea, but if not completely successful then a combination therapy utilizing CPAP plus oral appliance therapy may be effective. Oral appliances tend to be effective in a broad range of patients although studies show that the patients who have the highest success are females, younger patients, those with milder disease, and less severe obesity. 3, 6.   The chances of success are lower in patients who have more severe GOVIND, are older, and those who are morbidly  obese.     Example of an oral appliance   Finding a dentist that practices dental sleep medicine  Specific training is available through the American Academy of Dental Sleep Medicine for dentists interested in working in the field of sleep. To find a dentist who is educated in the field of sleep and the use of oral appliances, near you, visit the Web site of the American Academy of Dental Sleep Medicine; also see   http://www.accpstorage.org/newOrganization/patients/oralAppliances.pdf  To search for a dentist certified in these practices:  Http://aadsm.org/FindADentist.aspx?1  1. Rama et al. Objectively measured vs self-reported compliance during oral appliance therapy for sleep-disordered breathing. Chest 2013; 144(5): 2955-7698.  2. Tere et al. Objective measurement of compliance during oral appliance therapy for sleep-disordered breathing. Thorax 2013; 68(1): 91-96.  3. Luis, et al. Mandibular advancement devices in 620 men and women with GOVIND and snoring: tolerability and predictors of treatment success. Chest 2004; 125: 5946-3132.  4. Halima, et al. Oral appliances for snoring and GOVIND: a review. Sleep 2006; 29: 244-262.  5. Yung et al. Oral appliance treatment for GOVIND: an update. J Clin Sleep Med 2014; 10(2): 215-227.  6. Trey et al. Predictors of OSAH treatment outcome. J Dent Res 2007; 86: 1538-2302.    Oral Appliance  These are examples of two of many custom-made devices that are more likely to work in mild sleep apnea                                                      Oral appliances are dental mouth pieces that fit very much like a sports mouth guards or removable orthodontic retainers. They are used to treat snoring  And obstructive sleep apnea . The device prevents the airway from collapsing by either holding the tongue or supporting the jaw in a forward position. Since oral appliances are non-invasive and easy to use, they may be considered as an early treatment option.  Oral appliance therapy (OAT) involves the customization, selection, fabrication, fitting, adjustments and long-term follow-up care of specially designed oral devices, worn during sleep, which reposition the lower jaw and tongue base forward to maintain an open airway.  Custom made oral appliances are proven to be more effective than over-the-counter devices. Therefore, the over-the-counter devices are recommended not to be used as a screening tool nor as a therapeutic option.     Who gets a dental device?  Oral appliance therapy can be used as an alternative to  CPAP therapy for the treatment of mild to moderate sleep apnea and for those patients who prefer OAT to CPAP. Oral appliance therapy is a first line therapy for the treatment of primary snoring. Additionally, OAT is an option for those that cannot tolerate CPAP as therapy or who have experienced insufficient surgical results.                 Possible side effects?  Frequent but minor side effects include: excessive salivation, dry mouth, discomfort of teeth and jaw and temporary changes in the patient s bite.  Potential complications include: jaw pain, permanent occlusal changes and TMJ symptoms.  The above mentioned side effects and complications can be recognized and managed by dentists trained in dental sleep medicine.   Finding a dentist that practices dental sleep medicine  Specific training is available through the American Academy of Dental Sleep Medicine for dentists interested in working in the field of sleep. To find a dentist who is educated in the field of sleep and the use of oral appliances, near you, visit the Web site of the American Academy of Dental Sleep Medicine; also see http://www.accpstorage.org/newOrganization/patients/oralAppliances.pdf   To search for a dentist certified in these practices:  Http://aadsm.org/FindADentist.aspx?1  Http://www.accpstorage.org/newOrganization/patients/oralAppliances.pdf        Your blood pressure was  checked while you were in clinic today.  Please read the guidelines below about what these numbers mean and what you should do about them.  Your systolic blood pressure is the top number.  This is the pressure when the heart is pumping.  Your diastolic blood pressure is the bottom number.  This is the pressure in between beats.  If your systolic blood pressure is less than 120 and your diastolic blood pressure is less than 80, then your blood pressure is normal. There is nothing more that you need to do about it  If your systolic blood pressure is 120-139 or your diastolic blood pressure is 80-89, your blood pressure may be higher than it should be.  You should have your blood pressure re-checked within a year by a primary care provider.  If your systolic blood pressure is 140 or greater or your diastolic blood pressure is 90 or greater, you may have high blood pressure.  High blood pressure is treatable, but if left untreated over time it can put you at risk for heart attack, stroke, or kidney failure.  You should have your blood pressure re-checked by a primary care provider within the next four weeks.  Your BMI is Body mass index is 26.92 kg/m .  Weight management is a personal decision.  If you are interested in exploring weight loss strategies, the following discussion covers the approaches that may be successful. Body mass index (BMI) is one way to tell whether you are at a healthy weight, overweight, or obese. It measures your weight in relation to your height.  A BMI of 18.5 to 24.9 is in the healthy range. A person with a BMI of 25 to 29.9 is considered overweight, and someone with a BMI of 30 or greater is considered obese. More than two-thirds of American adults are considered overweight or obese.  Being overweight or obese increases the risk for further weight gain. Excess weight may lead to heart disease and diabetes.  Creating and following plans for healthy eating and physical activity may help you  improve your health.  Weight control is part of healthy lifestyle and includes exercise, emotional health, and healthy eating habits. Careful eating habits lifelong are the mainstay of weight control. Though there are significant health benefits from weight loss, long-term weight loss with diet alone may be very difficult to achieve- studies show long-term success with dietary management in less than 10% of people. Attaining a healthy weight may be especially difficult to achieve in those with severe obesity. In some cases, medications, devices and surgical management might be considered.  What can you do?  If you are overweight or obese and are interested in methods for weight loss, you should discuss this with your provider.     Consider reducing daily calorie intake by 500 calories.     Keep a food journal.     Avoiding skipping meals, consider cutting portions instead.    Diet combined with exercise helps maintain muscle while optimizing fat loss. Strength training is particularly important for building and maintaining muscle mass. Exercise helps reduce stress, increase energy, and improves fitness. Increasing exercise without diet control, however, may not burn enough calories to loose weight.       Start walking three days a week 10-20 minutes at a time    Work towards walking thirty minutes five days a week     Eventually, increase the speed of your walking for 1-2 minutes at time    In addition, we recommend that you review healthy lifestyles and methods for weight loss available through the National Institutes of Health patient information sites:  http://win.niddk.nih.gov/publications/index.htm    And look into health and wellness programs that may be available through your health insurance provider, employer, local community center, or [employer].

## 2019-05-15 NOTE — PROGRESS NOTES
Sleep Study Follow-Up Visit:    Date on this visit: 5/15/2019    ASSESSMENT / PLAN:    GOVIND (obstructive sleep apnea)  Discussed with patient the recent sleep study and treatment options, we recommend oral appliance for the treatment of his mild GOVIND and/or socially disrupting snoring. Avoid sleeping supine position and weight loss as below.  Instructions given. Follow up 6 months after oral device use and repeat home sleep study to determine efficacy of oral device. If in-laboratory sleep study is ordered or required for oral appliance titration, make sure to bring your adjustment tool for the sleep study, and in addition will contact sleep dentist for the details of oral appliance titration.  Counseled regarding weight loss through diet modification and increased physical activity. Patient was given instuctions of weight loss and advised to follow up his/her PCP for further weight loss interventions.      All questions were answered.  The patient indicates understanding of the above issues and agrees with the plan set forth.      Orders Placed This Encounter   Procedures     SLEEP DENTAL REFERRAL       He will follow up with me in about 6 month(s).       BRIEF SUMMARY:    Rickey Butt is a 43 year old male with history of premature ventricular contractures, depression, generalized anxiety disorder and snoring comes in today for follow-up of his sleep study done on 5/2/19 at the [facility] for result of sleep study. Please see H&P for his presenting sleep symptoms for additional details.    Home sleep study: 5/2/18   AHI 5.7/hr (supine 5.7/hr)  WARD 4.9/hr  Snore index 4.9%  Lowest O 2 saturation is 89%  Time spent O 2 saturation below 88% was 0 minutes    These findings were reviewed with the patient and copy of the sleep study result was given.    Past medical/surgical history, family history, social history, medications and allergies were reviewed.      Problem List:  Patient Active Problem List  "   Diagnosis Date Noted     Paroxysmal ventricular tachycardia (H) 02/27/2019     Priority: Medium     Heart palpitations      Priority: Medium     Seasonal allergic rhinitis 03/31/2016     Priority: Medium     CARDIOVASCULAR SCREENING; LDL GOAL LESS THAN 160 07/18/2013     Priority: Medium     Family hx of colon cancer 07/18/2013     Priority: Medium     Family hx of melanoma 07/18/2013     Priority: Medium     Mild major depression (H) 07/18/2013     Priority: Medium     Generalized anxiety disorder 07/18/2013     Priority: Medium     Diagnosis updated by automated process. Provider to review and confirm.               Medications:     Current Outpatient Medications   Medication Sig     azelastine (ASTELIN) 0.1 % spray Spray 1 spray into both nostrils 2 times daily     cetirizine (ZYRTEC) 10 MG tablet Take 10 mg by mouth as needed      fluticasone (FLONASE) 50 MCG/ACT spray Spray 1-2 sprays into both nostrils daily (Patient taking differently: Spray 1-2 sprays into both nostrils as needed )     metoprolol succinate (TOPROL-XL) 25 MG 24 hr tablet Take 1 tablet (25 mg) by mouth daily At bedtime     montelukast (SINGULAIR) 10 MG tablet Take 1 tablet (10 mg) by mouth At Bedtime (Patient not taking: Reported on 4/29/2019)     sertraline (ZOLOFT) 25 MG tablet Take 1 tablet (25 mg) by mouth daily Patient taking 1.5 tabs daily     No current facility-administered medications for this visit.           PHYSICAL EXAMINATION:  /78   Pulse 60   Ht 1.854 m (6' 0.99\")   Wt 92.5 kg (204 lb)   SpO2 95%   BMI 26.92 kg/m            Data: All pertinent previous laboratory data reviewed     No results found for: PH, PHARTERIAL, PO2, [id number], SAT, PCO2, HCO3, BASEEXCESS, CHRISTINA, BEB  Lab Results   Component Value Date    TSH 0.56 09/11/2018    TSH 0.86 10/19/2015     Lab Results   Component Value Date    GLC 90 02/27/2019    GLC 90 09/09/2018     Lab Results   Component Value Date    HGB 14.9 07/18/2013     Lab Results "   Component Value Date    BUN 12 09/09/2018    BUN 14 07/18/2013    CR 0.91 09/09/2018    CR 0.86 07/18/2013     No results found for: PAULO     Fifteen minutes spent with patient, all of which were spent face-to-face counseling, consulting, coordinating plan of care, going over sleep test results and chart review.        Sergio Grimm MD 5/15/2019   [facility]  [address], [place] [address]   [phone] Clinic      CC: No ref. provider found  Copy to: Eduardo Mckinnon

## 2019-08-20 ENCOUNTER — MYC MEDICAL ADVICE (OUTPATIENT)
Dept: FAMILY MEDICINE | Facility: CLINIC | Age: 44
End: 2019-08-20

## 2019-08-21 ENCOUNTER — E-VISIT (OUTPATIENT)
Dept: FAMILY MEDICINE | Facility: CLINIC | Age: 44
End: 2019-08-21
Payer: COMMERCIAL

## 2019-08-21 DIAGNOSIS — F41.1 GENERALIZED ANXIETY DISORDER: ICD-10-CM

## 2019-08-21 DIAGNOSIS — F32.0 MILD MAJOR DEPRESSION (H): ICD-10-CM

## 2019-08-21 PROCEDURE — 99444 ZZC PHYSICIAN ONLINE EVALUATION & MANAGEMENT SERVICE: CPT | Performed by: PHYSICIAN ASSISTANT

## 2019-08-21 RX ORDER — SERTRALINE HYDROCHLORIDE 25 MG/1
25 TABLET, FILM COATED ORAL DAILY
Qty: 135 TABLET | Refills: 1 | Status: SHIPPED | OUTPATIENT
Start: 2019-08-21 | End: 2020-05-20

## 2019-08-21 ASSESSMENT — PATIENT HEALTH QUESTIONNAIRE - PHQ9
SUM OF ALL RESPONSES TO PHQ QUESTIONS 1-9: 1
10. IF YOU CHECKED OFF ANY PROBLEMS, HOW DIFFICULT HAVE THESE PROBLEMS MADE IT FOR YOU TO DO YOUR WORK, TAKE CARE OF THINGS AT HOME, OR GET ALONG WITH OTHER PEOPLE: NOT DIFFICULT AT ALL
SUM OF ALL RESPONSES TO PHQ QUESTIONS 1-9: 1

## 2019-08-22 ASSESSMENT — PATIENT HEALTH QUESTIONNAIRE - PHQ9: SUM OF ALL RESPONSES TO PHQ QUESTIONS 1-9: 1

## 2019-10-30 ENCOUNTER — OFFICE VISIT (OUTPATIENT)
Dept: CARDIOLOGY | Facility: CLINIC | Age: 44
End: 2019-10-30
Attending: INTERNAL MEDICINE
Payer: COMMERCIAL

## 2019-10-30 VITALS
DIASTOLIC BLOOD PRESSURE: 76 MMHG | BODY MASS INDEX: 28.18 KG/M2 | WEIGHT: 212.6 LBS | HEART RATE: 60 BPM | HEIGHT: 73 IN | SYSTOLIC BLOOD PRESSURE: 122 MMHG

## 2019-10-30 DIAGNOSIS — R00.2 HEART PALPITATIONS: ICD-10-CM

## 2019-10-30 DIAGNOSIS — I49.3 PVC'S (PREMATURE VENTRICULAR CONTRACTIONS): ICD-10-CM

## 2019-10-30 PROCEDURE — 93000 ELECTROCARDIOGRAM COMPLETE: CPT | Performed by: INTERNAL MEDICINE

## 2019-10-30 PROCEDURE — 99213 OFFICE O/P EST LOW 20 MIN: CPT | Performed by: INTERNAL MEDICINE

## 2019-10-30 ASSESSMENT — MIFFLIN-ST. JEOR: SCORE: 1913.23

## 2019-10-30 NOTE — LETTER
10/30/2019    Eduardo Mckinnon PA-C  [address] [facility] [address]    RE: Rickey Butt       Dear Colleague,    I had the pleasure of seeing Rickey Butt in the [facility].    HPI and Plan:   See dictation    Orders Placed This Encounter   Procedures     Follow-Up with Cardiologist     EKG 12-lead complete w/read - Clinics (to be scheduled)       No orders of the defined types were placed in this encounter.      There are no discontinued medications.      Encounter Diagnoses   Name Primary?     PVC's (premature ventricular contractions)      Heart palpitations        CURRENT MEDICATIONS:  Current Outpatient Medications   Medication Sig Dispense Refill     cetirizine (ZYRTEC) 10 MG tablet Take 10 mg by mouth as needed        fluticasone (FLONASE) 50 MCG/ACT spray Spray 1-2 sprays into both nostrils daily (Patient taking differently: Spray 1-2 sprays into both nostrils as needed ) 1 Bottle 0     metoprolol succinate (TOPROL-XL) 25 MG 24 hr tablet Take 1 tablet (25 mg) by mouth daily At bedtime 90 tablet 3     sertraline (ZOLOFT) 25 MG tablet Take 1 tablet (25 mg) by mouth daily Patient taking 1.5 tabs daily 135 tablet 1     azelastine (ASTELIN) 0.1 % spray Spray 1 spray into both nostrils 2 times daily 1 Bottle 3     montelukast (SINGULAIR) 10 MG tablet Take 1 tablet (10 mg) by mouth At Bedtime (Patient not taking: Reported on 4/29/2019) 90 tablet 1       ALLERGIES     Allergies   Allergen Reactions     Seasonal Allergies        PAST MEDICAL HISTORY:  Past Medical History:   Diagnosis Date     CARDIOVASCULAR SCREENING; LDL GOAL LESS THAN 160 7/18/2013     Family hx of colon cancer 7/18/2013     Family hx of melanoma 7/18/2013     Generalised anxiety disorder 7/18/2013     Heart palpitations      Mild major depression (H) 7/18/2013       PAST SURGICAL HISTORY:  Past Surgical History:   Procedure Laterality Date     APPENDECTOMY  2007     HERNIA REPAIR    "      FAMILY HISTORY:  Family History   Problem Relation Age of Onset     Cancer Mother         melanoma       SOCIAL HISTORY:  Social History     Socioeconomic History     Marital status:      Spouse name: None     Number of children: None     Years of education: None     Highest education level: None   Occupational History     None   Social Needs     Financial resource strain: None     Food insecurity:     Worry: None     Inability: None     Transportation needs:     Medical: None     Non-medical: None   Tobacco Use     Smoking status: Never Smoker     Smokeless tobacco: Never Used   Substance and Sexual Activity     Alcohol use: Yes     Comment: five bottles of beer per week     Drug use: No     Sexual activity: Yes     Partners: Female   Lifestyle     Physical activity:     Days per week: None     Minutes per session: None     Stress: None   Relationships     Social connections:     Talks on phone: None     Gets together: None     Attends [employer] service: None     Active member of club or organization: None     Attends meetings of clubs or organizations: None     Relationship status: None     Intimate partner violence:     Fear of current or ex partner: None     Emotionally abused: None     Physically abused: None     Forced sexual activity: None   Other Topics Concern     Parent/sibling w/ CABG, MI or angioplasty before 65F 55M? No   Social History Narrative     None       Review of Systems:  Skin:  Negative       Eyes:  Positive for glasses    ENT:  Negative      Respiratory:  Positive for sleep apnea does not wear CPAP   Cardiovascular:    Positive for;palpitations    Gastroenterology: Negative      Genitourinary:  Negative      Musculoskeletal:  Negative      Neurologic:  Positive for numbness or tingling of hands \"sometimes\" - positional  Psychiatric:  Positive for sleep disturbances due to snoring  Heme/Lymph/Imm:  Negative      Endocrine:  Negative        Physical Exam:  Vitals: /76 (BP " "Location: Right arm, Patient Position: Chair, Cuff Size: Adult Regular)   Pulse 60   Ht 1.854 m (6' 1\")   Wt 96.4 kg (212 lb 9.6 oz)   BMI 28.05 kg/m       Constitutional:  cooperative, alert and oriented, well developed, well nourished, in no acute distress        Skin:  warm and dry to the touch, no apparent skin lesions or masses noted          Head:  normocephalic, no masses or lesions        Eyes:  pupils equal and round, conjunctivae and lids unremarkable, sclera white, no xanthalasma, EOMS intact, no nystagmus        Lymph:      ENT:  no pallor or cyanosis        Neck:  carotid pulses are full and equal bilaterally, JVP normal, no carotid bruit        Respiratory:  normal breath sounds, clear to auscultation, normal A-P diameter, normal symmetry, normal respiratory excursion, no use of accessory muscles         Cardiac: regular rhythm;normal S1 and S2;no S3 or S4;apical impulse not displaced;no murmurs, gallops or rubs detected                pulses full and equal                                        GI:  not assessed this visit        Extremities and Muscular Skeletal:  no deformities, clubbing, cyanosis, erythema observed;no edema              Neurological:  no gross motor deficits;affect appropriate        Psych:  Alert and Oriented x 3        CC  Ángel Amaro MD  [address]  [place] [address]                Thank you for allowing me to participate in the care of your patient.      Sincerely,     Ángel Amaro MD, MD     [facility]    cc:   Ángel Amaro MD  [address]  [place] [id number]        "

## 2019-10-30 NOTE — LETTER
10/30/2019      Eduardo Mckinnon PA-C  [address] [facility] [address]      RE: Rickey Butt       Dear Colleague,    I had the pleasure of seeing Rickey Butt in the [facility].    Service Date: 10/30/2019      CARDIOLOGY FOLLOWUP VISIT      REFERRING HEALTH CARE PROVIDER:  JERILYN Eastman.      HISTORY OF PRESENT ILLNESS:  It is my pleasure to see your patient, Rickey Butt, who is a pleasant 44-year-old man complaining of palpitations.  Our investigations confirm that his palpitations were due to PVCs.  He had a structurally normal heart and stress echocardiography showed no evidence of ischemia.   One Holter monitor showed that he had 6340 PVCs in a 24-hour period, representing 6% of his QRS complexes.  He had a 7-beat run of ventricular tachycardia also.      With that background in mind, he has been feeling well.  He only feels palpitations very infrequently.  He is taking metoprolol succinate 25 mg per day.  He has cut his caffeine down significantly.  He was taking 5 cups of coffee in the morning.  Now, he is only taking one.  He has no other cardiac symptoms.  His blood pressure is well controlled at 122/76 and his pulse is 60 beats per minute.  I did not feel any PVCs while listening to him or feeling his pulse today.      IMPRESSION:   1.  Palpitations due to PVCs.  His symptoms have markedly improved compared to this time last year on metoprolol and significant reduction on caffeine intake.      PLAN:  We will continue him on his present medications.  He is tolerating the metoprolol well.  As always, however, he has been told to contact us if he has any questions or any concerns.  It has been my pleasure to be involved in the care of this extremely nice patient.      cc:   Eduardo Mckinnon PA-C   [address]  [address]         LU WILSON MD, FACC             D: 10/30/2019   T:  10/30/2019   MT: CALEB      Name:     LEIGH COOL   MRN:      [id number]        Account:      [id number]   :      1975           Service Date: 10/30/2019      Document: [id number]         Outpatient Encounter Medications as of 10/30/2019   Medication Sig Dispense Refill     cetirizine (ZYRTEC) 10 MG tablet Take 10 mg by mouth as needed        fluticasone (FLONASE) 50 MCG/ACT spray Spray 1-2 sprays into both nostrils daily (Patient taking differently: Spray 1-2 sprays into both nostrils as needed ) 1 Bottle 0     metoprolol succinate (TOPROL-XL) 25 MG 24 hr tablet Take 1 tablet (25 mg) by mouth daily At bedtime 90 tablet 3     sertraline (ZOLOFT) 25 MG tablet Take 1 tablet (25 mg) by mouth daily Patient taking 1.5 tabs daily 135 tablet 1     azelastine (ASTELIN) 0.1 % spray Spray 1 spray into both nostrils 2 times daily 1 Bottle 3     montelukast (SINGULAIR) 10 MG tablet Take 1 tablet (10 mg) by mouth At Bedtime (Patient not taking: Reported on 2019) 90 tablet 1     No facility-administered encounter medications on file as of 10/30/2019.            Again, thank you for allowing me to participate in the care of your patient.      Sincerely,    Ángel Amaro MD, MD     [facility]

## 2019-10-30 NOTE — PROGRESS NOTES
HPI and Plan:   See dictation    Orders Placed This Encounter   Procedures     Follow-Up with Cardiologist     EKG 12-lead complete w/read - Clinics (to be scheduled)       No orders of the defined types were placed in this encounter.      There are no discontinued medications.      Encounter Diagnoses   Name Primary?     PVC's (premature ventricular contractions)      Heart palpitations        CURRENT MEDICATIONS:  Current Outpatient Medications   Medication Sig Dispense Refill     cetirizine (ZYRTEC) 10 MG tablet Take 10 mg by mouth as needed        fluticasone (FLONASE) 50 MCG/ACT spray Spray 1-2 sprays into both nostrils daily (Patient taking differently: Spray 1-2 sprays into both nostrils as needed ) 1 Bottle 0     metoprolol succinate (TOPROL-XL) 25 MG 24 hr tablet Take 1 tablet (25 mg) by mouth daily At bedtime 90 tablet 3     sertraline (ZOLOFT) 25 MG tablet Take 1 tablet (25 mg) by mouth daily Patient taking 1.5 tabs daily 135 tablet 1     azelastine (ASTELIN) 0.1 % spray Spray 1 spray into both nostrils 2 times daily 1 Bottle 3     montelukast (SINGULAIR) 10 MG tablet Take 1 tablet (10 mg) by mouth At Bedtime (Patient not taking: Reported on 4/29/2019) 90 tablet 1       ALLERGIES     Allergies   Allergen Reactions     Seasonal Allergies        PAST MEDICAL HISTORY:  Past Medical History:   Diagnosis Date     CARDIOVASCULAR SCREENING; LDL GOAL LESS THAN 160 7/18/2013     Family hx of colon cancer 7/18/2013     Family hx of melanoma 7/18/2013     Generalised anxiety disorder 7/18/2013     Heart palpitations      Mild major depression (H) 7/18/2013       PAST SURGICAL HISTORY:  Past Surgical History:   Procedure Laterality Date     APPENDECTOMY  2007     HERNIA REPAIR         FAMILY HISTORY:  Family History   Problem Relation Age of Onset     Cancer Mother         melanoma       SOCIAL HISTORY:  Social History     Socioeconomic History     Marital status:      Spouse name: None     Number of children:  "None     Years of education: None     Highest education level: None   Occupational History     None   Social Needs     Financial resource strain: None     Food insecurity:     Worry: None     Inability: None     Transportation needs:     Medical: None     Non-medical: None   Tobacco Use     Smoking status: Never Smoker     Smokeless tobacco: Never Used   Substance and Sexual Activity     Alcohol use: Yes     Comment: five bottles of beer per week     Drug use: No     Sexual activity: Yes     Partners: Female   Lifestyle     Physical activity:     Days per week: None     Minutes per session: None     Stress: None   Relationships     Social connections:     Talks on phone: None     Gets together: None     Attends [employer] service: None     Active member of club or organization: None     Attends meetings of clubs or organizations: None     Relationship status: None     Intimate partner violence:     Fear of current or ex partner: None     Emotionally abused: None     Physically abused: None     Forced sexual activity: None   Other Topics Concern     Parent/sibling w/ CABG, MI or angioplasty before 65F 55M? No   Social History Narrative     None       Review of Systems:  Skin:  Negative       Eyes:  Positive for glasses    ENT:  Negative      Respiratory:  Positive for sleep apnea does not wear CPAP   Cardiovascular:    Positive for;palpitations    Gastroenterology: Negative      Genitourinary:  Negative      Musculoskeletal:  Negative      Neurologic:  Positive for numbness or tingling of hands \"sometimes\" - positional  Psychiatric:  Positive for sleep disturbances due to snoring  Heme/Lymph/Imm:  Negative      Endocrine:  Negative        Physical Exam:  Vitals: /76 (BP Location: Right arm, Patient Position: Chair, Cuff Size: Adult Regular)   Pulse 60   Ht 1.854 m (6' 1\")   Wt 96.4 kg (212 lb 9.6 oz)   BMI 28.05 kg/m      Constitutional:  cooperative, alert and oriented, well developed, well nourished, in no " acute distress        Skin:  warm and dry to the touch, no apparent skin lesions or masses noted          Head:  normocephalic, no masses or lesions        Eyes:  pupils equal and round, conjunctivae and lids unremarkable, sclera white, no xanthalasma, EOMS intact, no nystagmus        Lymph:      ENT:  no pallor or cyanosis        Neck:  carotid pulses are full and equal bilaterally, JVP normal, no carotid bruit        Respiratory:  normal breath sounds, clear to auscultation, normal A-P diameter, normal symmetry, normal respiratory excursion, no use of accessory muscles         Cardiac: regular rhythm;normal S1 and S2;no S3 or S4;apical impulse not displaced;no murmurs, gallops or rubs detected                pulses full and equal                                        GI:  not assessed this visit        Extremities and Muscular Skeletal:  no deformities, clubbing, cyanosis, erythema observed;no edema              Neurological:  no gross motor deficits;affect appropriate        Psych:  Alert and Oriented x 3        CC  Ángel Amaro MD  [address]  [place] [id number]

## 2019-10-30 NOTE — PROGRESS NOTES
Service Date: 10/30/2019      CARDIOLOGY FOLLOWUP VISIT      REFERRING HEALTH CARE PROVIDER:  JERILYN Eastman.      HISTORY OF PRESENT ILLNESS:  It is my pleasure to see your patient, Rickey Cool, who is a pleasant 44-year-old man complaining of palpitations.  Our investigations confirm that his palpitations were due to PVCs.  He had a structurally normal heart and stress echocardiography showed no evidence of ischemia.   One Holter monitor showed that he had 6340 PVCs in a 24-hour period, representing 6% of his QRS complexes.  He had a 7-beat run of ventricular tachycardia also.      With that background in mind, he has been feeling well.  He only feels palpitations very infrequently.  He is taking metoprolol succinate 25 mg per day.  He has cut his caffeine down significantly.  He was taking 5 cups of coffee in the morning.  Now, he is only taking one.  He has no other cardiac symptoms.  His blood pressure is well controlled at 122/76 and his pulse is 60 beats per minute.  I did not feel any PVCs while listening to him or feeling his pulse today.      IMPRESSION:   1.  Palpitations due to PVCs.  His symptoms have markedly improved compared to this time last year on metoprolol and significant reduction on caffeine intake.      PLAN:  We will continue him on his present medications.  He is tolerating the metoprolol well.  As always, however, he has been told to contact us if he has any questions or any concerns.  It has been my pleasure to be involved in the care of this extremely nice patient.      cc:   E[place]ardo [place] [address]         LU WILSON MD, FACC             D: 10/30/2019   T: 10/30/2019   MT: CALEB      Name:     RICKEY COOL   MRN:      [id number]        Account:      [id number]   :      1975           Service Date: 10/30/2019      Document: [id number]

## 2019-11-04 ENCOUNTER — HEALTH MAINTENANCE LETTER (OUTPATIENT)
Age: 44
End: 2019-11-04

## 2019-11-21 DIAGNOSIS — I47.29 PAROXYSMAL VENTRICULAR TACHYCARDIA (H): ICD-10-CM

## 2019-11-21 DIAGNOSIS — I49.3 PVC'S (PREMATURE VENTRICULAR CONTRACTIONS): ICD-10-CM

## 2019-11-21 RX ORDER — METOPROLOL SUCCINATE 25 MG/1
25 TABLET, EXTENDED RELEASE ORAL DAILY
Qty: 90 TABLET | Refills: 2 | Status: SHIPPED | OUTPATIENT
Start: 2019-11-21 | End: 2020-08-17

## 2020-05-19 ASSESSMENT — PATIENT HEALTH QUESTIONNAIRE - PHQ9: SUM OF ALL RESPONSES TO PHQ QUESTIONS 1-9: 0

## 2020-05-19 NOTE — PROGRESS NOTES
"Rickey Butt is a 44 year old male who is being evaluated via a billable telephone visit.      The patient has been notified of following:     \"This telephone visit will be conducted via a call between you and your physician/provider. We have found that certain health care needs can be provided without the need for a physical exam.  This service lets us provide the care you need with a short phone conversation.  If a prescription is necessary we can send it directly to your pharmacy.  If lab work is needed we can place an order for that and you can then stop by our lab to have the test done at a later time.    Telephone visits are billed at different rates depending on your insurance coverage. During this emergency period, for some insurers they may be billed the same as an in-person visit.  Please reach out to your insurance provider with any questions.    If during the course of the call the physician/provider feels a telephone visit is not appropriate, you will not be charged for this service.\"    Patient has given verbal consent for Telephone visit?  Yes    What phone number would you like to be contacted at? See demographics    How would you like to obtain your AVS? MyChart    Subjective     Rickey Butt is a 44 year old male who presents via phone visit today for the following health issues:    HPI  Depression Followup    How are you doing with your depression since your last visit? No change    Are you having other symptoms that might be associated with depression? No    Have you had a significant life event?  2 Grandparents passed away     Are you feeling anxious or having panic attacks?   No    Do you have any concerns with your use of alcohol or other drugs? No    Social History     Tobacco Use     Smoking status: Never Smoker     Smokeless tobacco: Never Used   Substance Use Topics     Alcohol use: Yes     Comment: five bottles of beer per week     Drug use: No     PHQ 2/27/2019 8/21/2019 " 5/19/2020   PHQ-9 Total Score 0 1 0   Q9: Thoughts of better off dead/self-harm past 2 weeks Not at all Not at all Not at all     JENIFER-7 SCORE 12/7/2015 5/4/2018 2/27/2019   Total Score - - -   Total Score - - 1 (minimal anxiety)   Total Score 3 1 1       Suicide Assessment Five-step Evaluation and Treatment (SAFE-T)      How many servings of fruits and vegetables do you eat daily?  2-3    On average, how many sweetened beverages do you drink each day (Examples: soda, juice, sweet tea, etc.  Do NOT count diet or artificially sweetened beverages)?   1    How many days per week do you exercise enough to make your heart beat faster? 3 or less    How many minutes a day do you exercise enough to make your heart beat faster? 20 - 29    How many days per week do you miss taking your medication? 0         Patient Active Problem List   Diagnosis     CARDIOVASCULAR SCREENING; LDL GOAL LESS THAN 160     Family hx of colon cancer     Family hx of melanoma     Mild major depression (H)     Generalized anxiety disorder     Seasonal allergic rhinitis     Heart palpitations     Paroxysmal ventricular tachycardia (H)     Past Surgical History:   Procedure Laterality Date     APPENDECTOMY  2007     HERNIA REPAIR         Social History     Tobacco Use     Smoking status: Never Smoker     Smokeless tobacco: Never Used   Substance Use Topics     Alcohol use: Yes     Comment: five bottles of beer per week     Family History   Problem Relation Age of Onset     Cancer Mother         melanoma         Current Outpatient Medications   Medication Sig Dispense Refill     sertraline (ZOLOFT) 25 MG tablet Take 1 tablet (25 mg) by mouth daily Patient taking 1.5 tabs daily 135 tablet 1     FLUCELVAX QUADRIVALENT 0.5 ML AL        fluticasone (FLONASE) 50 MCG/ACT spray Spray 1-2 sprays into both nostrils daily (Patient taking differently: Spray 1-2 sprays into both nostrils as needed ) 1 Bottle 0     metoprolol succinate ER (TOPROL-XL) 25 MG 24 hr  tablet Take 1 tablet (25 mg) by mouth daily At bedtime 90 tablet 2     Allergies   Allergen Reactions     Seasonal Allergies      Recent Labs   Lab Test 02/27/19  0723 09/11/18  1150 09/09/18  1706 10/19/15  0804 07/18/13  0832   *  --   --   --  89   HDL 46  --   --   --  38*   TRIG 99  --   --   --  92   ALT  --   --   --   --  35   CR  --   --  0.91  --  0.86   GFRESTIMATED  --   --  >90  --  >90   GFRESTBLACK  --   --  >90  --  >90   POTASSIUM  --   --  4.1  --  4.2   TSH  --  0.56  --  0.86 0.63      BP Readings from Last 3 Encounters:   10/30/19 122/76   05/15/19 124/78   04/29/19 113/74    Wt Readings from Last 3 Encounters:   10/30/19 96.4 kg (212 lb 9.6 oz)   05/15/19 92.5 kg (204 lb)   04/29/19 92.6 kg (204 lb 3.2 oz)                    Reviewed and updated as needed this visit by Provider  Tobacco  Allergies  Meds  Problems  Med Hx  Surg Hx  Fam Hx         Review of Systems   Constitutional, HEENT, cardiovascular, pulmonary, GI, , musculoskeletal, neuro, skin, endocrine and psych systems are negative, except as otherwise noted.       Objective   Reported vitals:  There were no vitals taken for this visit.   healthy, alert and no distress  PSYCH: Alert and oriented times 3; coherent speech, normal   rate and volume, able to articulate logical thoughts, able   to abstract reason, no tangential thoughts, no hallucinations   or delusions  His affect is normal  RESP: No cough, no audible wheezing, able to talk in full sentences  Remainder of exam unable to be completed due to telephone visits    Diagnostic Test Results:  none         Assessment/Plan:  1. Mild major depression (H)  Stable. Follow up 6 months to 12 months for physical.   - sertraline (ZOLOFT) 25 MG tablet; Take 1 tablet (25 mg) by mouth daily Patient taking 1.5 tabs daily  Dispense: 135 tablet; Refill: 1  -Medication use and side effects discussed with the patient. Patient is in complete understanding and agreement with plan.        2. Generalized anxiety disorder  As above   - sertraline (ZOLOFT) 25 MG tablet; Take 1 tablet (25 mg) by mouth daily Patient taking 1.5 tabs daily  Dispense: 135 tablet; Refill: 1    Return in about 1 year (around 5/20/2021) for Physical Exam, depression/anxiety follow up.      Phone call duration:  7 minutes    Eduardo Mckinnon PA-C

## 2020-05-20 ENCOUNTER — VIRTUAL VISIT (OUTPATIENT)
Dept: FAMILY MEDICINE | Facility: CLINIC | Age: 45
End: 2020-05-20
Payer: COMMERCIAL

## 2020-05-20 DIAGNOSIS — F41.1 GENERALIZED ANXIETY DISORDER: ICD-10-CM

## 2020-05-20 DIAGNOSIS — F32.0 MILD MAJOR DEPRESSION (H): ICD-10-CM

## 2020-05-20 PROCEDURE — 99213 OFFICE O/P EST LOW 20 MIN: CPT | Mod: TEL | Performed by: PHYSICIAN ASSISTANT

## 2020-05-20 RX ORDER — SERTRALINE HYDROCHLORIDE 25 MG/1
25 TABLET, FILM COATED ORAL DAILY
Qty: 135 TABLET | Refills: 1 | Status: SHIPPED | OUTPATIENT
Start: 2020-05-20 | End: 2020-11-17

## 2020-05-20 RX ORDER — INFLUENZA A VIRUS A/NEBRASKA/14/2019 (H1N1) ANTIGEN (MDCK CELL DERIVED, PROPIOLACTONE INACTIVATED), INFLUENZA A VIRUS A/DELAWARE/39/2019 (H3N2) ANTIGEN (MDCK CELL DERIVED, PROPIOLACTONE INACTIVATED), INFLUENZA B VIRUS B/SINGAPORE/INFTT-16-0610/2016 ANTIGEN (MDCK CELL DERIVED, PROPIOLACTONE INACTIVATED), INFLUENZA B VIRUS B/DARWIN/7/2019 ANTIGEN (MDCK CELL DERIVED, PROPIOLACTONE INACTIVATED) 15; 15; 15; 15 UG/.5ML; UG/.5ML; UG/.5ML; UG/.5ML
INJECTION, SUSPENSION INTRAMUSCULAR
COMMUNITY
Start: 2019-11-09 | End: 2022-11-23

## 2020-08-17 DIAGNOSIS — I49.3 PVC'S (PREMATURE VENTRICULAR CONTRACTIONS): ICD-10-CM

## 2020-08-17 DIAGNOSIS — I47.29 PAROXYSMAL VENTRICULAR TACHYCARDIA (H): ICD-10-CM

## 2020-08-17 RX ORDER — METOPROLOL SUCCINATE 25 MG/1
25 TABLET, EXTENDED RELEASE ORAL DAILY
Qty: 90 TABLET | Refills: 0 | Status: SHIPPED | OUTPATIENT
Start: 2020-08-17 | End: 2020-11-16

## 2020-09-15 ENCOUNTER — VIRTUAL VISIT (OUTPATIENT)
Dept: FAMILY MEDICINE | Facility: OTHER | Age: 45
End: 2020-09-15
Payer: COMMERCIAL

## 2020-09-15 PROCEDURE — 99421 OL DIG E/M SVC 5-10 MIN: CPT | Performed by: PHYSICIAN ASSISTANT

## 2020-09-16 DIAGNOSIS — Z20.822 SUSPECTED COVID-19 VIRUS INFECTION: Primary | ICD-10-CM

## 2020-09-16 NOTE — PROGRESS NOTES
"Date: 09/15/2020 19:19:33  Clinician: Josef Contreras  Clinician NPI: [id number]  Patient: Rickey Butt  Patient : 1975  Patient Address: [address] [place] [address]  Patient Phone: [phone]  Visit Protocol: URI  Patient Summary:  Rickey is a 45 year old ( : 1975 ) male who initiated a [facility] Visit for COVID-19 (Coronavirus) evaluation and screening. When asked the question \"Please sign me up to receive news, health information and promotions from [facility].\", Rickey responded \"No\".    When asked when his symptoms started, Rickey reported that he does not have any symptoms.   He denies taking antibiotic medication in the past month and having recent facial or sinus surgery in the past 60 days.    Pertinent COVID-19 (Coronavirus) information  In the past 14 days, Rickey has not worked in a congregate living setting.   He does not work or volunteer as healthcare worker or a  and does not work or volunteer in a healthcare facility.   Rickey also has not lived in a congregate living setting in the past 14 days. He does not live with a healthcare worker.   Rickey has had a close contact with a laboratory-confirmed COVID-19 patient in the last 14 days. Additional information about contact with COVID-19 (Coronavirus) patient as reported by the patient (free text): My wife tested positive for Covid-19.  Her symptoms started on .  She received a covid test on .  She was notified that she tested positive on .  We live under the same roof along with our 2 sons ages 9 and 11.  So far, the rest of us are displaying no symptoms.    Patient reported they are living in the same household with a COVID-19 positive patient.  Since 2019, Rickey and has not had upper respiratory infection or influenza-like illness. Has not been diagnosed with lab-confirmed COVID-19 test   Pertinent medical history  Rickey does not need a return to work/school note.   Weight: " 210 lbs   Rickey does not smoke or use smokeless tobacco.   Weight: 210 lbs    MEDICATIONS: metoprolol succinate oral, sertraline oral, ALLERGIES: NKDA  Clinician Response:  Dear Rickey,   Based on your exposure to COVID-19 (coronavirus), we would like to test you for this virus.  1. Please call [phone] to schedule your visit. Explain that you were referred by [facility] to have a COVID-19 test. Be ready to share your [facility] visit ID number.  The following will serve as your written order for this COVID Test, ordered by me, for the indication of suspected COVID [Z20.828]: The test will be ordered in [facility], our electronic health record, after you are scheduled. It will show as ordered and authorized by Andrew Pinedo MD.  Order: COVID-19 (coronavirus) PCR for ASYMPTOMATIC EXPOSURE testing from [facility].  If you know you have had close contact with someone who tested positive, you should be quarantined for 14 days after this exposure. You should stay in quarantine for the14 days even if the covid test is negative, the optimal time to test after exposure is 5-7 days from the exposure  Quarantine means   What should I do?  For safety, it's very important to follow these rules. Do this for 14 days after the date you were last exposed to the virus..  Stay home and away from others. Don't go to school or anywhere else. Generally quarantine means staying home from work but there are some exceptions to this. Please contact your workplace.   No hugging, kissing or shaking hands.  Don't let anyone visit.  Cover your mouth and nose with a mask, tissue or washcloth to avoid spreading germs.  Wash your hands and face often. Use soap and water.  What are the symptoms of COVID-19?  The most common symptoms are cough, fever and trouble breathing. Less common symptoms include headache, body aches, fatigue (feeling very tired), chills, sore throat, stuffy or runny nose, diarrhea (loose poop), loss of taste or smell, belly pain, and  nausea or vomiting (feeling sick to your stomach or throwing up).  After 14 days, if you have still don't have symptoms, you likely don't have this virus.  If you develop symptoms, follow these guidelines.  If you're normally healthy: Please start another [facility] visit to report your symptoms. Go to [email or web address].  If you have a serious health problem (like cancer, heart failure, an organ transplant or kidney disease): Call your specialty clinic. Let them know that you might have COVID-19.  2. When it's time for your COVID test:  Stay at least 6 feet away from others. (If someone will drive you to your test, stay in the backseat, as far away from the  as you can.)  Cover your mouth and nose with a mask, tissue or washcloth.  Go straight to the testing site. Don't make any stops on the way there or back.  Please note  Caregivers in these groups are at risk for severe illness due to COVID-19:  o People 65 years and older  o People who live in a nursing home or long-term care facility  o People with chronic disease (lung, heart, cancer, diabetes, kidney, liver, immunologic)  o People who have a weakened immune system, including those who:  Are in cancer treatment  Take medicine that weakens the immune system, such as corticosteroids  Had a bone marrow or organ transplant  Have an immune deficiency  Have poorly controlled HIV or AIDS  Are obese (body mass index of 40 or higher)  Smoke regularly  Where can I get more information?  [facility] -- About COVID-19: [email or web address]  CDC -- What to Do If You're Sick: www.cdc.gov/coronavirus/2019-ncov/about/steps-when-sick.html  CDC -- Ending Home Isolation: www.cdc.gov/coronavirus/2019-ncov/hcp/disposition-in-home-patients.html  CDC -- Caring for Someone: www.cdc.gov/coronavirus/2019-ncov/if-you-are-sick/care-for-someone.html  [facility] -- Interim Guidance for Hospital Discharge to Home: www.health.[facility].[place].us/diseases/coronavirus/hcp/hospdischarge.pdf   [facility] clinical trials (COVID-19 research studies): clinicalaffairs.[facility]-clinical-trials  Below are the COVID-19 hotlines at the [facility] ([facility]). Interpreters are available.  For health questions: Call 713-014-3117 or 1-518.264.8229 (7 a.m. to 7 p.m.)  For questions about schools and childcare: Call 605-079-6977 or 1-847.480.3705 (7 a.m. to 7 p.m.)    Diagnosis: Acute upper respiratory infection, unspecified  Diagnosis ICD: J06.9  Additional Clinician Notes:  Please submit [facility] visits for your children so they can be tested also.

## 2020-09-17 DIAGNOSIS — Z20.822 SUSPECTED COVID-19 VIRUS INFECTION: ICD-10-CM

## 2020-09-17 PROCEDURE — U0003 INFECTIOUS AGENT DETECTION BY NUCLEIC ACID (DNA OR RNA); SEVERE ACUTE RESPIRATORY SYNDROME CORONAVIRUS 2 (SARS-COV-2) (CORONAVIRUS DISEASE [COVID-19]), AMPLIFIED PROBE TECHNIQUE, MAKING USE OF HIGH THROUGHPUT TECHNOLOGIES AS DESCRIBED BY CMS-2020-01-R: HCPCS | Performed by: FAMILY MEDICINE

## 2020-09-18 LAB
SARS-COV-2 RNA SPEC QL NAA+PROBE: NOT DETECTED
SPECIMEN SOURCE: NORMAL

## 2020-11-06 ENCOUNTER — OFFICE VISIT (OUTPATIENT)
Dept: CARDIOLOGY | Facility: CLINIC | Age: 45
End: 2020-11-06
Payer: COMMERCIAL

## 2020-11-06 VITALS
BODY MASS INDEX: 27.96 KG/M2 | WEIGHT: 211 LBS | SYSTOLIC BLOOD PRESSURE: 114 MMHG | HEART RATE: 65 BPM | HEIGHT: 73 IN | DIASTOLIC BLOOD PRESSURE: 71 MMHG

## 2020-11-06 DIAGNOSIS — I47.29 PAROXYSMAL VENTRICULAR TACHYCARDIA (H): Primary | ICD-10-CM

## 2020-11-06 DIAGNOSIS — R00.2 HEART PALPITATIONS: ICD-10-CM

## 2020-11-06 DIAGNOSIS — I49.3 PVC'S (PREMATURE VENTRICULAR CONTRACTIONS): ICD-10-CM

## 2020-11-06 PROCEDURE — 93000 ELECTROCARDIOGRAM COMPLETE: CPT | Performed by: INTERNAL MEDICINE

## 2020-11-06 PROCEDURE — 99213 OFFICE O/P EST LOW 20 MIN: CPT | Performed by: INTERNAL MEDICINE

## 2020-11-06 ASSESSMENT — MIFFLIN-ST. JEOR: SCORE: 1900.97

## 2020-11-06 NOTE — LETTER
11/6/2020    Eduardo Mckinnon PA-C  [address]  [facility] [address]    RE: Rickey Butt       Dear Colleague,    I had the pleasure of seeing Rickey Butt in the [facility].    HPI and Plan:   See dictation    Orders Placed This Encounter   Procedures     Follow-Up with Cardiologist     EKG 12-lead complete w/read - Clinics (performed today)     EKG 12-lead complete w/read - Clinics (to be scheduled)       No orders of the defined types were placed in this encounter.      There are no discontinued medications.      Encounter Diagnoses   Name Primary?     Paroxysmal ventricular tachycardia (H) Yes     PVC's (premature ventricular contractions)      Heart palpitations        CURRENT MEDICATIONS:  Current Outpatient Medications   Medication Sig Dispense Refill     fluticasone (FLONASE) 50 MCG/ACT spray Spray 1-2 sprays into both nostrils daily (Patient taking differently: Spray 1-2 sprays into both nostrils as needed ) 1 Bottle 0     metoprolol succinate ER (TOPROL-XL) 25 MG 24 hr tablet Take 1 tablet (25 mg) by mouth daily At bedtime 90 tablet 0     sertraline (ZOLOFT) 25 MG tablet Take 1 tablet (25 mg) by mouth daily Patient taking 1.5 tabs daily 135 tablet 1     FLUCELVAX QUADRIVALENT 0.5 ML AL          ALLERGIES     Allergies   Allergen Reactions     Seasonal Allergies        PAST MEDICAL HISTORY:  Past Medical History:   Diagnosis Date     CARDIOVASCULAR SCREENING; LDL GOAL LESS THAN 160 7/18/2013     Family hx of colon cancer 7/18/2013     Family hx of melanoma 7/18/2013     Generalised anxiety disorder 7/18/2013     Heart palpitations      Mild major depression (H) 7/18/2013       PAST SURGICAL HISTORY:  Past Surgical History:   Procedure Laterality Date     APPENDECTOMY  2007     HERNIA REPAIR         FAMILY HISTORY:  Family History   Problem Relation Age of Onset     Cancer Mother         melanoma       SOCIAL HISTORY:  Social History     Socioeconomic  "History     Marital status:      Spouse name: None     Number of children: None     Years of education: None     Highest education level: None   Occupational History     None   Social Needs     Financial resource strain: None     Food insecurity     Worry: None     Inability: None     Transportation needs     Medical: None     Non-medical: None   Tobacco Use     Smoking status: Never Smoker     Smokeless tobacco: Never Used   Substance and Sexual Activity     Alcohol use: Yes     Comment: five bottles of beer per week     Drug use: No     Sexual activity: Yes     Partners: Female   Lifestyle     Physical activity     Days per week: None     Minutes per session: None     Stress: None   Relationships     Social connections     Talks on phone: None     Gets together: None     Attends Hoahaoism service: None     Active member of club or organization: None     Attends meetings of clubs or organizations: None     Relationship status: None     Intimate partner violence     Fear of current or ex partner: None     Emotionally abused: None     Physically abused: None     Forced sexual activity: None   Other Topics Concern     Parent/sibling w/ CABG, MI or angioplasty before 65F 55M? No   Social History Narrative     None       Review of Systems:  Skin:  Negative       Eyes:  Positive for eye pain    ENT:  Negative      Respiratory:  Positive for sleep apnea     Cardiovascular:  Negative      Gastroenterology: Negative      Genitourinary:  not assessed      Musculoskeletal:  Negative      Neurologic:  Negative      Psychiatric:  Negative      Heme/Lymph/Imm:  Positive for allergies    Endocrine:  Negative        Physical Exam:  Vitals: /71   Pulse 65   Ht 1.854 m (6' 1\")   Wt 95.7 kg (211 lb)   BMI 27.84 kg/m      Constitutional:  cooperative, alert and oriented, well developed, well nourished, in no acute distress        Skin:  warm and dry to the touch, no apparent skin lesions or masses noted          Head: "  normocephalic, no masses or lesions        Eyes:  pupils equal and round, conjunctivae and lids unremarkable, sclera white, no xanthalasma, EOMS intact, no nystagmus        Lymph:      ENT:  no pallor or cyanosis        Neck:  carotid pulses are full and equal bilaterally, JVP normal, no carotid bruit        Respiratory:  normal breath sounds, clear to auscultation, normal A-P diameter, normal symmetry, normal respiratory excursion, no use of accessory muscles         Cardiac: regular rhythm;normal S1 and S2;no S3 or S4;apical impulse not displaced;no murmurs, gallops or rubs detected occasional premature beats              pulses full and equal                                        GI:  not assessed this visit        Extremities and Muscular Skeletal:  no deformities, clubbing, cyanosis, erythema observed;no edema              Neurological:  no gross motor deficits;affect appropriate        Psych:  Alert and Oriented x 3        CC  No referring provider defined for this encounter.                  Thank you for allowing me to participate in the care of your patient.      Sincerely,     Ángel Amaro MD, MD     [facility]    cc:   No referring provider defined for this encounter.

## 2020-11-06 NOTE — LETTER
11/6/2020      Eduardo Mckinnon PA-C  [address] [facility] [address]      RE: Rickey Butt       Dear Colleague,    I had the pleasure of seeing Rickey Butt in the [facility].    Service Date: 11/06/2020      HISTORY OF PRESENT ILLNESS:  It is my pleasure to see your patient, Rickey Butt, in followup.  This is a patient with a history of palpitations due mainly to PVCs.  A 24-hour Holter monitor in the past showed 6340 PVCs in a 24-hour period, which represented 6% of his entire QRS complexes.  He had a short 7-beat run of ventricular tachycardia also.  He did undergo a stress echocardiogram which showed no evidence of ischemia and his complete echocardiogram shows a structurally normal heart.  He was taking large amounts of caffeine, up to 5 cups of coffee a day, but he has cut that back that down to about 1, and he is also taking metoprolol 25 mg per day.  He is feeling well with occasional palpitations but very few in general.  Overall, he feels well about the level of palpitations that.  The 12-lead electrocardiogram today was normal.  His blood pressure is well controlled at 114/71 with a pulse rate of 65 beats per minute.      IMPRESSION:   1.  Palpitations.  These have been significantly improved by the addition of metoprolol succinate 25 mg per day and the significant reduction in caffeine intake.   2.  Normal 12-lead electrocardiogram today.      PLAN:  We will continue the patient on his present medications.  I have advised him about lifestyle changes including reducing stress, getting enough sleep and avoiding sympathomimetic agents such as caffeine or caffeinated-containing products or compounds such as Sudafed.  Obviously, all the stimulant street drugs such as cocaine and methamphetamine all contain sympathomimetic agents.  He does not use any of those compounds.  We will see the patient back again in 1 year's time, but as always, he has been  told to contact us if he has any questions or any concerns.      cc:   Eduardo Mckinnon PA-C   [facility]   [id number] [place] [address]  [id number]         ÁNGEL WILSON MD, [facility]             D: 2020   T: 2020   MT: matilde      Name:     LEIGH COOL   MRN:      [id number]        Account:      [id number]   :      1975           Service Date: 2020      Document: [id number]          Outpatient Encounter Medications as of 2020   Medication Sig Dispense Refill     fluticasone (FLONASE) 50 MCG/ACT spray Spray 1-2 sprays into both nostrils daily (Patient taking differently: Spray 1-2 sprays into both nostrils as needed ) 1 Bottle 0     metoprolol succinate ER (TOPROL-XL) 25 MG 24 hr tablet Take 1 tablet (25 mg) by mouth daily At bedtime 90 tablet 0     sertraline (ZOLOFT) 25 MG tablet Take 1 tablet (25 mg) by mouth daily Patient taking 1.5 tabs daily 135 tablet 1     FLUCELVAX QUADRIVALENT 0.5 ML AL        No facility-administered encounter medications on file as of 2020.        Again, thank you for allowing me to participate in the care of your patient.      Sincerely,    Ángel Amaro MD, MD     [facility]

## 2020-11-06 NOTE — PROGRESS NOTES
HPI and Plan:   See dictation    Orders Placed This Encounter   Procedures     Follow-Up with Cardiologist     EKG 12-lead complete w/read - Clinics (performed today)     EKG 12-lead complete w/read - Clinics (to be scheduled)       No orders of the defined types were placed in this encounter.      There are no discontinued medications.      Encounter Diagnoses   Name Primary?     Paroxysmal ventricular tachycardia (H) Yes     PVC's (premature ventricular contractions)      Heart palpitations        CURRENT MEDICATIONS:  Current Outpatient Medications   Medication Sig Dispense Refill     fluticasone (FLONASE) 50 MCG/ACT spray Spray 1-2 sprays into both nostrils daily (Patient taking differently: Spray 1-2 sprays into both nostrils as needed ) 1 Bottle 0     metoprolol succinate ER (TOPROL-XL) 25 MG 24 hr tablet Take 1 tablet (25 mg) by mouth daily At bedtime 90 tablet 0     sertraline (ZOLOFT) 25 MG tablet Take 1 tablet (25 mg) by mouth daily Patient taking 1.5 tabs daily 135 tablet 1     FLUCELVAX QUADRIVALENT 0.5 ML AL          ALLERGIES     Allergies   Allergen Reactions     Seasonal Allergies        PAST MEDICAL HISTORY:  Past Medical History:   Diagnosis Date     CARDIOVASCULAR SCREENING; LDL GOAL LESS THAN 160 7/18/2013     Family hx of colon cancer 7/18/2013     Family hx of melanoma 7/18/2013     Generalised anxiety disorder 7/18/2013     Heart palpitations      Mild major depression (H) 7/18/2013       PAST SURGICAL HISTORY:  Past Surgical History:   Procedure Laterality Date     APPENDECTOMY  2007     HERNIA REPAIR         FAMILY HISTORY:  Family History   Problem Relation Age of Onset     Cancer Mother         melanoma       SOCIAL HISTORY:  Social History     Socioeconomic History     Marital status:      Spouse name: None     Number of children: None     Years of education: None     Highest education level: None   Occupational History     None   Social Needs     Financial resource strain: None  "    Food insecurity     Worry: None     Inability: None     Transportation needs     Medical: None     Non-medical: None   Tobacco Use     Smoking status: Never Smoker     Smokeless tobacco: Never Used   Substance and Sexual Activity     Alcohol use: Yes     Comment: five bottles of beer per week     Drug use: No     Sexual activity: Yes     Partners: Female   Lifestyle     Physical activity     Days per week: None     Minutes per session: None     Stress: None   Relationships     Social connections     Talks on phone: None     Gets together: None     Attends Yazdanism service: None     Active member of club or organization: None     Attends meetings of clubs or organizations: None     Relationship status: None     Intimate partner violence     Fear of current or ex partner: None     Emotionally abused: None     Physically abused: None     Forced sexual activity: None   Other Topics Concern     Parent/sibling w/ CABG, MI or angioplasty before 65F 55M? No   Social History Narrative     None       Review of Systems:  Skin:  Negative       Eyes:  Positive for eye pain    ENT:  Negative      Respiratory:  Positive for sleep apnea     Cardiovascular:  Negative      Gastroenterology: Negative      Genitourinary:  not assessed      Musculoskeletal:  Negative      Neurologic:  Negative      Psychiatric:  Negative      Heme/Lymph/Imm:  Positive for allergies    Endocrine:  Negative        Physical Exam:  Vitals: /71   Pulse 65   Ht 1.854 m (6' 1\")   Wt 95.7 kg (211 lb)   BMI 27.84 kg/m      Constitutional:  cooperative, alert and oriented, well developed, well nourished, in no acute distress        Skin:  warm and dry to the touch, no apparent skin lesions or masses noted          Head:  normocephalic, no masses or lesions        Eyes:  pupils equal and round, conjunctivae and lids unremarkable, sclera white, no xanthalasma, EOMS intact, no nystagmus        Lymph:      ENT:  no pallor or cyanosis        Neck:  " carotid pulses are full and equal bilaterally, JVP normal, no carotid bruit        Respiratory:  normal breath sounds, clear to auscultation, normal A-P diameter, normal symmetry, normal respiratory excursion, no use of accessory muscles         Cardiac: regular rhythm;normal S1 and S2;no S3 or S4;apical impulse not displaced;no murmurs, gallops or rubs detected occasional premature beats              pulses full and equal                                        GI:  not assessed this visit        Extremities and Muscular Skeletal:  no deformities, clubbing, cyanosis, erythema observed;no edema              Neurological:  no gross motor deficits;affect appropriate        Psych:  Alert and Oriented x 3        CC  No referring provider defined for this encounter.

## 2020-11-06 NOTE — PROGRESS NOTES
Service Date: 11/06/2020      HISTORY OF PRESENT ILLNESS:  It is my pleasure to see your patient, Rickey Butt, in followup.  This is a patient with a history of palpitations due mainly to PVCs.  A 24-hour Holter monitor in the past showed 6340 PVCs in a 24-hour period, which represented 6% of his entire QRS complexes.  He had a short 7-beat run of ventricular tachycardia also.  He did undergo a stress echocardiogram which showed no evidence of ischemia and his complete echocardiogram shows a structurally normal heart.  He was taking large amounts of caffeine, up to 5 cups of coffee a day, but he has cut that back that down to about 1, and he is also taking metoprolol 25 mg per day.  He is feeling well with occasional palpitations but very few in general.  Overall, he feels well about the level of palpitations that.  The 12-lead electrocardiogram today was normal.  His blood pressure is well controlled at 114/71 with a pulse rate of 65 beats per minute.      IMPRESSION:   1.  Palpitations.  These have been significantly improved by the addition of metoprolol succinate 25 mg per day and the significant reduction in caffeine intake.   2.  Normal 12-lead electrocardiogram today.      PLAN:  We will continue the patient on his present medications.  I have advised him about lifestyle changes including reducing stress, getting enough sleep and avoiding sympathomimetic agents such as caffeine or caffeinated-containing products or compounds such as Sudafed.  Obviously, all the stimulant street drugs such as cocaine and methamphetamine all contain sympathomimetic agents.  He does not use any of those compounds.  We will see the patient back again in 1 year's time, but as always, he has been told to contact us if he has any questions or any concerns.      cc:   Eduardo Mckinnon PA-C   [facility]   [address] [place]  [address]         LU WILSON MD, FACC             D: 11/06/2020    T: 2020   MT: matilde      Name:     LEIGH COOL   MRN:      [id number]        Account:      [id number]   :      1975           Service Date: 2020      Document: [id number]

## 2020-11-15 DIAGNOSIS — F32.0 MILD MAJOR DEPRESSION (H): ICD-10-CM

## 2020-11-15 DIAGNOSIS — F41.1 GENERALIZED ANXIETY DISORDER: ICD-10-CM

## 2020-11-16 DIAGNOSIS — I49.3 PVC'S (PREMATURE VENTRICULAR CONTRACTIONS): ICD-10-CM

## 2020-11-16 DIAGNOSIS — I47.29 PAROXYSMAL VENTRICULAR TACHYCARDIA (H): ICD-10-CM

## 2020-11-16 RX ORDER — METOPROLOL SUCCINATE 25 MG/1
25 TABLET, EXTENDED RELEASE ORAL DAILY
Qty: 90 TABLET | Refills: 3 | Status: SHIPPED | OUTPATIENT
Start: 2020-11-16 | End: 2021-11-12

## 2020-11-17 RX ORDER — SERTRALINE HYDROCHLORIDE 25 MG/1
TABLET, FILM COATED ORAL
Qty: 135 TABLET | Refills: 0 | Status: SHIPPED | OUTPATIENT
Start: 2020-11-17 | End: 2020-12-07

## 2020-11-17 NOTE — TELEPHONE ENCOUNTER
3 month bushra refill approved.     MA/TC: please assist patient in scheduling an office visit and completing a PHQ9    Elana Bui RN on 11/17/2020 at 12:10 PM

## 2020-11-22 ENCOUNTER — HEALTH MAINTENANCE LETTER (OUTPATIENT)
Age: 45
End: 2020-11-22

## 2020-12-05 ASSESSMENT — ENCOUNTER SYMPTOMS
ARTHRALGIAS: 0
SHORTNESS OF BREATH: 0
PARESTHESIAS: 0
WEAKNESS: 0
DYSURIA: 0
HEARTBURN: 0
DIZZINESS: 0
COUGH: 0
HEADACHES: 0
EYE PAIN: 0
ABDOMINAL PAIN: 0
PALPITATIONS: 0
FEVER: 0
CHILLS: 0
MYALGIAS: 0
DIARRHEA: 0
CONSTIPATION: 0
SORE THROAT: 0
NERVOUS/ANXIOUS: 0
FREQUENCY: 0
JOINT SWELLING: 0
HEMATURIA: 0
HEMATOCHEZIA: 0
NAUSEA: 0

## 2020-12-07 ENCOUNTER — OFFICE VISIT (OUTPATIENT)
Dept: FAMILY MEDICINE | Facility: CLINIC | Age: 45
End: 2020-12-07
Payer: COMMERCIAL

## 2020-12-07 VITALS
DIASTOLIC BLOOD PRESSURE: 79 MMHG | BODY MASS INDEX: 27.21 KG/M2 | SYSTOLIC BLOOD PRESSURE: 123 MMHG | HEART RATE: 61 BPM | HEIGHT: 74 IN | OXYGEN SATURATION: 97 % | WEIGHT: 212 LBS | TEMPERATURE: 98 F

## 2020-12-07 DIAGNOSIS — F32.0 MILD MAJOR DEPRESSION (H): ICD-10-CM

## 2020-12-07 DIAGNOSIS — Z11.4 SCREENING FOR HIV (HUMAN IMMUNODEFICIENCY VIRUS): ICD-10-CM

## 2020-12-07 DIAGNOSIS — Z00.00 ROUTINE GENERAL MEDICAL EXAMINATION AT A HEALTH CARE FACILITY: Primary | ICD-10-CM

## 2020-12-07 DIAGNOSIS — Z11.59 NEED FOR HEPATITIS C SCREENING TEST: ICD-10-CM

## 2020-12-07 DIAGNOSIS — F41.1 GENERALIZED ANXIETY DISORDER: ICD-10-CM

## 2020-12-07 DIAGNOSIS — Z11.59 SCREENING FOR VIRAL DISEASE: ICD-10-CM

## 2020-12-07 PROCEDURE — 80061 LIPID PANEL: CPT | Performed by: PHYSICIAN ASSISTANT

## 2020-12-07 PROCEDURE — 82947 ASSAY GLUCOSE BLOOD QUANT: CPT | Performed by: PHYSICIAN ASSISTANT

## 2020-12-07 PROCEDURE — 87389 HIV-1 AG W/HIV-1&-2 AB AG IA: CPT | Performed by: PHYSICIAN ASSISTANT

## 2020-12-07 PROCEDURE — 86803 HEPATITIS C AB TEST: CPT | Performed by: PHYSICIAN ASSISTANT

## 2020-12-07 PROCEDURE — 99396 PREV VISIT EST AGE 40-64: CPT | Performed by: PHYSICIAN ASSISTANT

## 2020-12-07 PROCEDURE — 36415 COLL VENOUS BLD VENIPUNCTURE: CPT | Performed by: PHYSICIAN ASSISTANT

## 2020-12-07 PROCEDURE — 86769 SARS-COV-2 COVID-19 ANTIBODY: CPT | Performed by: PHYSICIAN ASSISTANT

## 2020-12-07 RX ORDER — SERTRALINE HYDROCHLORIDE 25 MG/1
TABLET, FILM COATED ORAL
Qty: 135 TABLET | Refills: 2 | Status: SHIPPED | OUTPATIENT
Start: 2020-12-07 | End: 2021-11-12

## 2020-12-07 ASSESSMENT — ANXIETY QUESTIONNAIRES
4. TROUBLE RELAXING: NOT AT ALL
6. BECOMING EASILY ANNOYED OR IRRITABLE: SEVERAL DAYS
GAD7 TOTAL SCORE: 1
7. FEELING AFRAID AS IF SOMETHING AWFUL MIGHT HAPPEN: NOT AT ALL
5. BEING SO RESTLESS THAT IT IS HARD TO SIT STILL: NOT AT ALL
GAD7 TOTAL SCORE: 1
1. FEELING NERVOUS, ANXIOUS, OR ON EDGE: NOT AT ALL
7. FEELING AFRAID AS IF SOMETHING AWFUL MIGHT HAPPEN: NOT AT ALL
GAD7 TOTAL SCORE: 1
2. NOT BEING ABLE TO STOP OR CONTROL WORRYING: NOT AT ALL
3. WORRYING TOO MUCH ABOUT DIFFERENT THINGS: NOT AT ALL

## 2020-12-07 ASSESSMENT — ENCOUNTER SYMPTOMS
JOINT SWELLING: 0
CHILLS: 0
MYALGIAS: 0
PARESTHESIAS: 0
EYE PAIN: 0
FREQUENCY: 0
DIZZINESS: 0
DIARRHEA: 0
ABDOMINAL PAIN: 0
NAUSEA: 0
HEADACHES: 0
HEMATURIA: 0
NERVOUS/ANXIOUS: 0
HEMATOCHEZIA: 0
FEVER: 0
DYSURIA: 0
HEARTBURN: 0
SHORTNESS OF BREATH: 0
COUGH: 0
ARTHRALGIAS: 0
WEAKNESS: 0
CONSTIPATION: 0
SORE THROAT: 0
PALPITATIONS: 0

## 2020-12-07 ASSESSMENT — MIFFLIN-ST. JEOR: SCORE: 1921.38

## 2020-12-08 LAB
CHOLEST SERPL-MCNC: 164 MG/DL
COVID-19 SPIKE RBD ABY TITER: NORMAL
COVID-19 SPIKE RBD ABY: NEGATIVE
GLUCOSE SERPL-MCNC: 91 MG/DL (ref 70–99)
HCV AB SERPL QL IA: NONREACTIVE
HDLC SERPL-MCNC: 37 MG/DL
HIV 1+2 AB+HIV1 P24 AG SERPL QL IA: NONREACTIVE
LDLC SERPL CALC-MCNC: 90 MG/DL
NONHDLC SERPL-MCNC: 127 MG/DL
TRIGL SERPL-MCNC: 185 MG/DL

## 2020-12-08 ASSESSMENT — ANXIETY QUESTIONNAIRES: GAD7 TOTAL SCORE: 1

## 2020-12-08 ASSESSMENT — PATIENT HEALTH QUESTIONNAIRE - PHQ9: SUM OF ALL RESPONSES TO PHQ QUESTIONS 1-9: 0

## 2021-06-07 ENCOUNTER — TELEPHONE (OUTPATIENT)
Dept: FAMILY MEDICINE | Facility: CLINIC | Age: 46
End: 2021-06-07

## 2021-06-07 NOTE — TELEPHONE ENCOUNTER
PHQ 11/17/2020 12/7/2020 6/7/2021   PHQ-9 Total Score 1 0 0   Q9: Thoughts of better off dead/self-harm past 2 weeks Not at all Not at all Not at all     Pt having no concerns will close encounter    Mari Wadsworth/XIOMY  [place]---[facility]

## 2021-06-07 NOTE — TELEPHONE ENCOUNTER
Sent my-chart message    Mari Wadsworth/[facility]  Steffany---[facility]

## 2021-09-18 ENCOUNTER — HEALTH MAINTENANCE LETTER (OUTPATIENT)
Age: 46
End: 2021-09-18

## 2021-11-11 DIAGNOSIS — F32.0 MILD MAJOR DEPRESSION (H): ICD-10-CM

## 2021-11-11 DIAGNOSIS — F41.1 GENERALIZED ANXIETY DISORDER: ICD-10-CM

## 2021-11-12 DIAGNOSIS — I49.3 PVC'S (PREMATURE VENTRICULAR CONTRACTIONS): ICD-10-CM

## 2021-11-12 DIAGNOSIS — I47.29 PAROXYSMAL VENTRICULAR TACHYCARDIA (H): ICD-10-CM

## 2021-11-12 RX ORDER — METOPROLOL SUCCINATE 25 MG/1
25 TABLET, EXTENDED RELEASE ORAL DAILY
Qty: 90 TABLET | Refills: 0 | Status: SHIPPED | OUTPATIENT
Start: 2021-11-12 | End: 2022-02-14

## 2021-11-12 RX ORDER — SERTRALINE HYDROCHLORIDE 25 MG/1
TABLET, FILM COATED ORAL
Qty: 135 TABLET | Refills: 0 | Status: SHIPPED | OUTPATIENT
Start: 2021-11-12 | End: 2021-12-07

## 2021-11-12 NOTE — TELEPHONE ENCOUNTER
Medication is being filled for 1 time refill only due to:  Patient needs to be seen because it has been more than one year since last visit.  Prescription approved per [facility] Refill Protocol.  Routed to  for scheduling  Ivana Arroyo RN, BSN  Message handled by CLINIC NURSE.

## 2021-11-16 NOTE — TELEPHONE ENCOUNTER
Contacted patient and scheduled an appt for 12/07/2021    Mari Wadsworth/XIOMY  [place]---[facility]

## 2021-11-19 ENCOUNTER — OFFICE VISIT (OUTPATIENT)
Dept: CARDIOLOGY | Facility: CLINIC | Age: 46
End: 2021-11-19
Payer: COMMERCIAL

## 2021-11-19 VITALS
SYSTOLIC BLOOD PRESSURE: 120 MMHG | WEIGHT: 212 LBS | BODY MASS INDEX: 27.21 KG/M2 | HEART RATE: 63 BPM | HEIGHT: 74 IN | DIASTOLIC BLOOD PRESSURE: 70 MMHG | OXYGEN SATURATION: 97 %

## 2021-11-19 DIAGNOSIS — R00.2 HEART PALPITATIONS: Primary | ICD-10-CM

## 2021-11-19 DIAGNOSIS — I49.3 PVC'S (PREMATURE VENTRICULAR CONTRACTIONS): ICD-10-CM

## 2021-11-19 DIAGNOSIS — I47.29 PAROXYSMAL VENTRICULAR TACHYCARDIA (H): ICD-10-CM

## 2021-11-19 PROCEDURE — 93000 ELECTROCARDIOGRAM COMPLETE: CPT | Performed by: INTERNAL MEDICINE

## 2021-11-19 PROCEDURE — 99213 OFFICE O/P EST LOW 20 MIN: CPT | Performed by: INTERNAL MEDICINE

## 2021-11-19 ASSESSMENT — MIFFLIN-ST. JEOR: SCORE: 1916.38

## 2021-11-19 NOTE — LETTER
11/19/2021    Eduardo Mckinnon PA-C  [address] [address] [facility] [address]    RE: Rickey Butt       Dear Colleague,    I had the pleasure of seeing Rickey Butt in the [facility].    HPI and Plan:   See dictation          Orders Placed This Encounter   Procedures     Follow-Up with Cardiologist     EKG 12-lead complete w/read - Clinics (performed today)     EKG 12-lead complete w/read - Clinics (to be scheduled)       No orders of the defined types were placed in this encounter.      There are no discontinued medications.      Encounter Diagnoses   Name Primary?     Heart palpitations Yes     PVC's (premature ventricular contractions)      Paroxysmal ventricular tachycardia (H)        CURRENT MEDICATIONS:  Current Outpatient Medications   Medication Sig Dispense Refill     fluticasone (FLONASE) 50 MCG/ACT spray Spray 1-2 sprays into both nostrils daily (Patient taking differently: Spray 1-2 sprays into both nostrils as needed ) 1 Bottle 0     metoprolol succinate ER (TOPROL-XL) 25 MG 24 hr tablet Take 1 tablet (25 mg) by mouth daily At bedtime 90 tablet 0     sertraline (ZOLOFT) 25 MG tablet TAKE 1 AND 1/2 TABLETS BY MOUTH DAILY 135 tablet 0     FLUCELVAX QUADRIVALENT 0.5 ML AL  (Patient not taking: Reported on 11/19/2021)         ALLERGIES     Allergies   Allergen Reactions     Seasonal Allergies        PAST MEDICAL HISTORY:  Past Medical History:   Diagnosis Date     CARDIOVASCULAR SCREENING; LDL GOAL LESS THAN 160 7/18/2013     Family hx of colon cancer 7/18/2013     Family hx of melanoma 7/18/2013     Generalised anxiety disorder 7/18/2013     Heart palpitations      Mild major depression (H) 7/18/2013       PAST SURGICAL HISTORY:  Past Surgical History:   Procedure Laterality Date     APPENDECTOMY  2007     HERNIA REPAIR         FAMILY HISTORY:  Family History   Problem Relation Age of Onset     Cancer Mother         melanoma  "      SOCIAL HISTORY:  Social History     Socioeconomic History     Marital status:      Spouse name: None     Number of children: None     Years of education: None     Highest education level: None   Occupational History     None   Tobacco Use     Smoking status: Never Smoker     Smokeless tobacco: Never Used   Substance and Sexual Activity     Alcohol use: Yes     Comment: five bottles of beer per week     Drug use: No     Sexual activity: Yes     Partners: Female   Other Topics Concern     Parent/sibling w/ CABG, MI or angioplasty before 65F 55M? No   Social History Narrative     None     Social Determinants of Health     Financial Resource Strain: Not on file   Food Insecurity: Not on file   Transportation Needs: Not on file   Physical Activity: Not on file   Stress: Not on file   Social Connections: Not on file   Intimate Partner Violence: Not on file   Housing Stability: Not on file       Review of Systems:  Skin:  Negative       Eyes:  Positive for glasses    ENT:  Negative      Respiratory:  Negative       Cardiovascular:  Negative      Gastroenterology: Negative      Genitourinary:  Negative      Musculoskeletal:  Negative      Neurologic:  Negative      Psychiatric:  Negative      Heme/Lymph/Imm:  Positive for allergies    Endocrine:  Negative        Physical Exam:  Vitals: /70 (BP Location: Right arm, Patient Position: Sitting, Cuff Size: Adult Regular)   Pulse 63   Ht 1.88 m (6' 2\")   Wt 96.2 kg (212 lb)   SpO2 97%   BMI 27.22 kg/m      Constitutional:  cooperative, alert and oriented, well developed, well nourished, in no acute distress        Skin:  warm and dry to the touch, no apparent skin lesions or masses noted          Head:  normocephalic, no masses or lesions        Eyes:  pupils equal and round, conjunctivae and lids unremarkable, sclera white, no xanthalasma, EOMS intact, no nystagmus        Lymph:      ENT:  no pallor or cyanosis        Neck:  carotid pulses are full and " equal bilaterally, JVP normal, no carotid bruit        Respiratory:  normal breath sounds, clear to auscultation, normal A-P diameter, normal symmetry, normal respiratory excursion, no use of accessory muscles         Cardiac: regular rhythm;normal S1 and S2;no S3 or S4;apical impulse not displaced;no murmurs, gallops or rubs detected                pulses full and equal                                        GI:  not assessed this visit        Extremities and Muscular Skeletal:  no deformities, clubbing, cyanosis, erythema observed;no edema              Neurological:  no gross motor deficits;affect appropriate        Psych:  Alert and Oriented x 3        CC  No referring provider defined for this encounter.    Thank you for allowing me to participate in the care of your patient.      Sincerely,     Ángel Amaro MD, MD     [facility]  cc:   No referring provider defined for this encounter.

## 2021-11-19 NOTE — PROGRESS NOTES
Service Date: 11/19/2021    CARDIOLOGY FOLLOWUP VISIT    REFERRING HEALTHCARE PROVIDER:  Eduardo Mckinnon PA-C    HISTORY OF PRESENT ILLNESS:  It is my pleasure to see your patient, Rickey Butt, in followup.  He is a very pleasant patient with a history of palpitations which are due mainly to PVCs.  As per my previous communication to you, a 24-hour Holter monitor did show 6340 PVCs in a 24-hour period and that represents 6% of his entire QRS complexes in a 24-hour period.  He also had a short 7-beat run of ventricular tachycardia.  Also, if you remember, he underwent a stress echocardiogram which showed no evidence of ischemia or inducible ventricular arrhythmias.  One of the triggers for his PVCs was probably a significant amount of caffeine intake, and with reduction of caffeine, he also had a reduction in palpitations.  We also started him on metoprolol succinate 25 mg per day which was also very successful.  With that background in mind, Mr. Butt is doing very well.  His episodes of palpitations or extrasystoles are very infrequent.  He is having no side effects from the beta blocker medication.  His 12-lead electrocardiogram today was essentially within normal limits and the ventricular rate was 56 beats per minute.  His blood pressure is well controlled today at 120/70 with a pulse rate of 63 beats per minute.    IMPRESSION:    1.  Palpitations.  These have significantly improved with metoprolol succinate and with decreased caffeine intake.  2.  Normal 12-lead electrocardiogram.  3.  Normotensive.    PLAN:  We will continue the patient on his present medications and we will see him back again in 1 year's time but as always, he has been told to contact us if he has any questions or any concerns.    Ángel Guerrero MD, FACC     cc:  Eduardo Mckinnon PA-C   [facility]  [id number] [place] [id number]    Ángel Guerrero MD, FACC        D: 11/19/2021   T: 11/19/2021   MT:  dw    Name:     LEIGH COOLAiram  MRN:      [id number]        Account:      [id number]   :      1975           Service Date: 2021       Document: [id number]

## 2021-11-19 NOTE — PROGRESS NOTES
HPI and Plan:   See dictation          Orders Placed This Encounter   Procedures     Follow-Up with Cardiologist     EKG 12-lead complete w/read - Clinics (performed today)     EKG 12-lead complete w/read - Clinics (to be scheduled)       No orders of the defined types were placed in this encounter.      There are no discontinued medications.      Encounter Diagnoses   Name Primary?     Heart palpitations Yes     PVC's (premature ventricular contractions)      Paroxysmal ventricular tachycardia (H)        CURRENT MEDICATIONS:  Current Outpatient Medications   Medication Sig Dispense Refill     fluticasone (FLONASE) 50 MCG/ACT spray Spray 1-2 sprays into both nostrils daily (Patient taking differently: Spray 1-2 sprays into both nostrils as needed ) 1 Bottle 0     metoprolol succinate ER (TOPROL-XL) 25 MG 24 hr tablet Take 1 tablet (25 mg) by mouth daily At bedtime 90 tablet 0     sertraline (ZOLOFT) 25 MG tablet TAKE 1 AND 1/2 TABLETS BY MOUTH DAILY 135 tablet 0     FLUCELVAX QUADRIVALENT 0.5 ML AL  (Patient not taking: Reported on 11/19/2021)         ALLERGIES     Allergies   Allergen Reactions     Seasonal Allergies        PAST MEDICAL HISTORY:  Past Medical History:   Diagnosis Date     CARDIOVASCULAR SCREENING; LDL GOAL LESS THAN 160 7/18/2013     Family hx of colon cancer 7/18/2013     Family hx of melanoma 7/18/2013     Generalised anxiety disorder 7/18/2013     Heart palpitations      Mild major depression (H) 7/18/2013       PAST SURGICAL HISTORY:  Past Surgical History:   Procedure Laterality Date     APPENDECTOMY  2007     HERNIA REPAIR         FAMILY HISTORY:  Family History   Problem Relation Age of Onset     Cancer Mother         melanoma       SOCIAL HISTORY:  Social History     Socioeconomic History     Marital status:      Spouse name: None     Number of children: None     Years of education: None     Highest education level: None   Occupational History     None   Tobacco Use     Smoking  "status: Never Smoker     Smokeless tobacco: Never Used   Substance and Sexual Activity     Alcohol use: Yes     Comment: five bottles of beer per week     Drug use: No     Sexual activity: Yes     Partners: Female   Other Topics Concern     Parent/sibling w/ CABG, MI or angioplasty before 65F 55M? No   Social History Narrative     None     Social Determinants of Health     Financial Resource Strain: Not on file   Food Insecurity: Not on file   Transportation Needs: Not on file   Physical Activity: Not on file   Stress: Not on file   Social Connections: Not on file   Intimate Partner Violence: Not on file   Housing Stability: Not on file       Review of Systems:  Skin:  Negative       Eyes:  Positive for glasses    ENT:  Negative      Respiratory:  Negative       Cardiovascular:  Negative      Gastroenterology: Negative      Genitourinary:  Negative      Musculoskeletal:  Negative      Neurologic:  Negative      Psychiatric:  Negative      Heme/Lymph/Imm:  Positive for allergies    Endocrine:  Negative        Physical Exam:  Vitals: /70 (BP Location: Right arm, Patient Position: Sitting, Cuff Size: Adult Regular)   Pulse 63   Ht 1.88 m (6' 2\")   Wt 96.2 kg (212 lb)   SpO2 97%   BMI 27.22 kg/m      Constitutional:  cooperative, alert and oriented, well developed, well nourished, in no acute distress        Skin:  warm and dry to the touch, no apparent skin lesions or masses noted          Head:  normocephalic, no masses or lesions        Eyes:  pupils equal and round, conjunctivae and lids unremarkable, sclera white, no xanthalasma, EOMS intact, no nystagmus        Lymph:      ENT:  no pallor or cyanosis        Neck:  carotid pulses are full and equal bilaterally, JVP normal, no carotid bruit        Respiratory:  normal breath sounds, clear to auscultation, normal A-P diameter, normal symmetry, normal respiratory excursion, no use of accessory muscles         Cardiac: regular rhythm;normal S1 and S2;no S3 " or S4;apical impulse not displaced;no murmurs, gallops or rubs detected                pulses full and equal                                        GI:  not assessed this visit        Extremities and Muscular Skeletal:  no deformities, clubbing, cyanosis, erythema observed;no edema              Neurological:  no gross motor deficits;affect appropriate        Psych:  Alert and Oriented x 3        CC  No referring provider defined for this encounter.

## 2021-12-01 ASSESSMENT — ANXIETY QUESTIONNAIRES
3. WORRYING TOO MUCH ABOUT DIFFERENT THINGS: NOT AT ALL
7. FEELING AFRAID AS IF SOMETHING AWFUL MIGHT HAPPEN: NOT AT ALL
6. BECOMING EASILY ANNOYED OR IRRITABLE: SEVERAL DAYS
IF YOU CHECKED OFF ANY PROBLEMS ON THIS QUESTIONNAIRE, HOW DIFFICULT HAVE THESE PROBLEMS MADE IT FOR YOU TO DO YOUR WORK, TAKE CARE OF THINGS AT HOME, OR GET ALONG WITH OTHER PEOPLE: NOT DIFFICULT AT ALL
2. NOT BEING ABLE TO STOP OR CONTROL WORRYING: NOT AT ALL
GAD7 TOTAL SCORE: 1
1. FEELING NERVOUS, ANXIOUS, OR ON EDGE: NOT AT ALL
5. BEING SO RESTLESS THAT IT IS HARD TO SIT STILL: NOT AT ALL

## 2021-12-01 ASSESSMENT — PATIENT HEALTH QUESTIONNAIRE - PHQ9
5. POOR APPETITE OR OVEREATING: NOT AT ALL
10. IF YOU CHECKED OFF ANY PROBLEMS, HOW DIFFICULT HAVE THESE PROBLEMS MADE IT FOR YOU TO DO YOUR WORK, TAKE CARE OF THINGS AT HOME, OR GET ALONG WITH OTHER PEOPLE: NOT DIFFICULT AT ALL
SUM OF ALL RESPONSES TO PHQ QUESTIONS 1-9: 0
SUM OF ALL RESPONSES TO PHQ QUESTIONS 1-9: 0

## 2021-12-07 ENCOUNTER — OFFICE VISIT (OUTPATIENT)
Dept: FAMILY MEDICINE | Facility: CLINIC | Age: 46
End: 2021-12-07
Payer: COMMERCIAL

## 2021-12-07 VITALS
BODY MASS INDEX: 27.21 KG/M2 | HEART RATE: 69 BPM | DIASTOLIC BLOOD PRESSURE: 62 MMHG | HEIGHT: 74 IN | SYSTOLIC BLOOD PRESSURE: 106 MMHG | WEIGHT: 212 LBS | OXYGEN SATURATION: 96 % | TEMPERATURE: 98.4 F

## 2021-12-07 DIAGNOSIS — F32.0 MILD MAJOR DEPRESSION (H): ICD-10-CM

## 2021-12-07 DIAGNOSIS — F41.1 GENERALIZED ANXIETY DISORDER: ICD-10-CM

## 2021-12-07 PROCEDURE — 99213 OFFICE O/P EST LOW 20 MIN: CPT | Performed by: PHYSICIAN ASSISTANT

## 2021-12-07 RX ORDER — SERTRALINE HYDROCHLORIDE 25 MG/1
TABLET, FILM COATED ORAL
Qty: 135 TABLET | Refills: 3 | Status: SHIPPED | OUTPATIENT
Start: 2021-12-07 | End: 2023-02-07

## 2021-12-07 ASSESSMENT — MIFFLIN-ST. JEOR: SCORE: 1916.38

## 2021-12-07 ASSESSMENT — ENCOUNTER SYMPTOMS: NERVOUS/ANXIOUS: 1

## 2021-12-07 NOTE — PROGRESS NOTES
Assessment & Plan     Mild major depression (H)  Stable on current regimen. No side effects. Will maintain. Follow up annually continued.   - sertraline (ZOLOFT) 25 MG tablet; TAKE 1 AND 1/2 TABLETS BY MOUTH DAILY    Generalized anxiety disorder    - sertraline (ZOLOFT) 25 MG tablet; TAKE 1 AND 1/2 TABLETS BY MOUTH DAILY           Return in about 1 year (around 12/7/2022) for Physical Exam, depression/anxiety follow up.    Eduardo Mckinnon PA-C  [facility] CHARLINE Lang is a 46 year old who presents for the following health issues     Anxiety    History of Present Illness       Mental Health Follow-up:  Patient presents to follow-up on Depression.Patient's depression since last visit has been:  No change  The patient is not having other symptoms associated with depression.      Any significant life events: No  Patient is not feeling anxious or having panic attacks.  Patient has no concerns about alcohol or drug use.     Social History  Tobacco Use    Smoking status: Never Smoker    Smokeless tobacco: Never Used  Alcohol use: Yes    Comment: five bottles of beer per week  Drug use: No      Today's PHQ-9         PHQ-9 Total Score:     (P) 0   PHQ-9 Q9 Thoughts of better off dead/self-harm past 2 weeks :   (P) Not at all   Thoughts of suicide or self harm:      Self-harm Plan:        Self-harm Action:          Safety concerns for self or others:           He eats 2-3 servings of fruits and vegetables daily.He consumes 0 sweetened beverage(s) daily.He exercises with enough effort to increase his heart rate 10 to 19 minutes per day.  He exercises with enough effort to increase his heart rate 3 or less days per week.   He is taking medications regularly.         Review of Systems   Psychiatric/Behavioral: The patient is nervous/anxious.       Constitutional, HEENT, cardiovascular, pulmonary, GI, , musculoskeletal, neuro, skin, endocrine and psych systems are negative, except  "as otherwise noted.      Objective    /62   Pulse 69   Temp 98.4  F (36.9  C) (Oral)   Ht 1.88 m (6' 2\")   Wt 96.2 kg (212 lb)   SpO2 96%   BMI 27.22 kg/m    Body mass index is 27.22 kg/m .  Physical Exam   GENERAL: healthy, alert and no distress  RESP: lungs clear to auscultation - no rales, rhonchi or wheezes  CV: regular rates and rhythm, normal S1 S2, no S3 or S4 and no murmur, click or rub  PSYCH: mentation appears normal, affect normal/bright            "

## 2021-12-08 ASSESSMENT — PATIENT HEALTH QUESTIONNAIRE - PHQ9: SUM OF ALL RESPONSES TO PHQ QUESTIONS 1-9: 0

## 2021-12-08 ASSESSMENT — ANXIETY QUESTIONNAIRES: GAD7 TOTAL SCORE: 1

## 2022-01-08 ENCOUNTER — HEALTH MAINTENANCE LETTER (OUTPATIENT)
Age: 47
End: 2022-01-08

## 2022-02-14 DIAGNOSIS — I47.29 PAROXYSMAL VENTRICULAR TACHYCARDIA (H): ICD-10-CM

## 2022-02-14 DIAGNOSIS — I49.3 PVC'S (PREMATURE VENTRICULAR CONTRACTIONS): ICD-10-CM

## 2022-02-14 RX ORDER — METOPROLOL SUCCINATE 25 MG/1
25 TABLET, EXTENDED RELEASE ORAL DAILY
Qty: 90 TABLET | Refills: 2 | Status: SHIPPED | OUTPATIENT
Start: 2022-02-14 | End: 2022-10-18

## 2022-10-18 ENCOUNTER — HOSPITAL ENCOUNTER (OUTPATIENT)
Dept: CARDIOLOGY | Facility: CLINIC | Age: 47
Discharge: HOME | End: 2022-10-18
Attending: INTERNAL MEDICINE | Admitting: INTERNAL MEDICINE
Payer: COMMERCIAL

## 2022-10-18 ENCOUNTER — OFFICE VISIT (OUTPATIENT)
Dept: CARDIOLOGY | Facility: CLINIC | Age: 47
End: 2022-10-18
Payer: COMMERCIAL

## 2022-10-18 ENCOUNTER — LAB (OUTPATIENT)
Dept: LAB | Facility: CLINIC | Age: 47
End: 2022-10-18
Payer: COMMERCIAL

## 2022-10-18 VITALS
OXYGEN SATURATION: 95 % | HEART RATE: 69 BPM | SYSTOLIC BLOOD PRESSURE: 112 MMHG | WEIGHT: 210.2 LBS | HEIGHT: 74 IN | BODY MASS INDEX: 26.98 KG/M2 | DIASTOLIC BLOOD PRESSURE: 70 MMHG

## 2022-10-18 DIAGNOSIS — R00.2 HEART PALPITATIONS: ICD-10-CM

## 2022-10-18 DIAGNOSIS — I47.29 PAROXYSMAL VENTRICULAR TACHYCARDIA (H): ICD-10-CM

## 2022-10-18 DIAGNOSIS — I49.3 PVC'S (PREMATURE VENTRICULAR CONTRACTIONS): ICD-10-CM

## 2022-10-18 LAB
ANION GAP SERPL CALCULATED.3IONS-SCNC: 10 MMOL/L (ref 7–15)
BUN SERPL-MCNC: 11.9 MG/DL (ref 6–20)
CALCIUM SERPL-MCNC: 9.6 MG/DL (ref 8.6–10)
CHLORIDE SERPL-SCNC: 107 MMOL/L (ref 98–107)
CREAT SERPL-MCNC: 0.89 MG/DL (ref 0.67–1.17)
DEPRECATED HCO3 PLAS-SCNC: 25 MMOL/L (ref 22–29)
GFR SERPL CREATININE-BSD FRML MDRD: >90 ML/MIN/1.73M2
GLUCOSE SERPL-MCNC: 99 MG/DL (ref 70–99)
MAGNESIUM SERPL-MCNC: 2.1 MG/DL (ref 1.7–2.3)
POTASSIUM SERPL-SCNC: 4.1 MMOL/L (ref 3.4–5.3)
SODIUM SERPL-SCNC: 142 MMOL/L (ref 136–145)
TSH SERPL DL<=0.005 MIU/L-ACNC: 0.67 UIU/ML (ref 0.3–4.2)

## 2022-10-18 PROCEDURE — 80048 BASIC METABOLIC PNL TOTAL CA: CPT | Performed by: INTERNAL MEDICINE

## 2022-10-18 PROCEDURE — 99214 OFFICE O/P EST MOD 30 MIN: CPT | Performed by: INTERNAL MEDICINE

## 2022-10-18 PROCEDURE — 83735 ASSAY OF MAGNESIUM: CPT | Performed by: INTERNAL MEDICINE

## 2022-10-18 PROCEDURE — 93227 XTRNL ECG REC<48 HR R&I: CPT | Performed by: INTERNAL MEDICINE

## 2022-10-18 PROCEDURE — 93000 ELECTROCARDIOGRAM COMPLETE: CPT | Performed by: INTERNAL MEDICINE

## 2022-10-18 PROCEDURE — 93226 XTRNL ECG REC<48 HR SCAN A/R: CPT

## 2022-10-18 PROCEDURE — 36415 COLL VENOUS BLD VENIPUNCTURE: CPT | Performed by: INTERNAL MEDICINE

## 2022-10-18 PROCEDURE — 84443 ASSAY THYROID STIM HORMONE: CPT | Performed by: INTERNAL MEDICINE

## 2022-10-18 RX ORDER — METOPROLOL SUCCINATE 25 MG/1
25 TABLET, EXTENDED RELEASE ORAL DAILY
Qty: 90 TABLET | Refills: 3 | Status: SHIPPED | OUTPATIENT
Start: 2022-10-18 | End: 2023-11-13

## 2022-10-18 NOTE — PROGRESS NOTES
HPI and Plan:   See dictation          Orders Placed This Encounter   Procedures     Basic metabolic panel     TSH with free T4 reflex     Magnesium     Follow-Up with Cardiology     EKG 12-lead complete w/read - Clinics (performed today)     EKG 12-lead complete w/read - Clinics (to be scheduled)     Holter Monitor 24 hour Adult Pediatric       Orders Placed This Encounter   Medications     metoprolol succinate ER (TOPROL XL) 25 MG 24 hr tablet     Sig: Take 1 tablet (25 mg) by mouth daily At bedtime     Dispense:  90 tablet     Refill:  3       Medications Discontinued During This Encounter   Medication Reason     metoprolol succinate ER (TOPROL-XL) 25 MG 24 hr tablet Reorder         Encounter Diagnoses   Name Primary?     Heart palpitations      PVC's (premature ventricular contractions)      Paroxysmal ventricular tachycardia        CURRENT MEDICATIONS:  Current Outpatient Medications   Medication Sig Dispense Refill     fluticasone (FLONASE) 50 MCG/ACT spray Spray 1-2 sprays into both nostrils daily (Patient taking differently: Spray 1-2 sprays into both nostrils as needed) 1 Bottle 0     metoprolol succinate ER (TOPROL XL) 25 MG 24 hr tablet Take 1 tablet (25 mg) by mouth daily At bedtime 90 tablet 3     sertraline (ZOLOFT) 25 MG tablet TAKE 1 AND 1/2 TABLETS BY MOUTH DAILY 135 tablet 3     FLUCELVAX QUADRIVALENT 0.5 ML AL  (Patient not taking: No sig reported)         ALLERGIES     Allergies   Allergen Reactions     Seasonal Allergies        PAST MEDICAL HISTORY:  Past Medical History:   Diagnosis Date     CARDIOVASCULAR SCREENING; LDL GOAL LESS THAN 160 7/18/2013     Family hx of colon cancer 7/18/2013     Family hx of melanoma 7/18/2013     Generalised anxiety disorder 7/18/2013     Heart palpitations      Mild major depression (H) 7/18/2013       PAST SURGICAL HISTORY:  Past Surgical History:   Procedure Laterality Date     APPENDECTOMY  2007     HERNIA REPAIR         FAMILY HISTORY:  Family History  "  Problem Relation Age of Onset     Cancer Mother         melanoma       SOCIAL HISTORY:  Social History     Socioeconomic History     Marital status:      Spouse name: None     Number of children: None     Years of education: None     Highest education level: None   Tobacco Use     Smoking status: Never     Smokeless tobacco: Never   Substance and Sexual Activity     Alcohol use: Yes     Comment: five bottles of beer per week     Drug use: No     Sexual activity: Yes     Partners: Female   Other Topics Concern     Parent/sibling w/ CABG, MI or angioplasty before 65F 55M? No       Review of Systems:  Skin:          Eyes:         ENT:         Respiratory:          Cardiovascular:         Gastroenterology:        Genitourinary:         Musculoskeletal:         Neurologic:         Psychiatric:         Heme/Lymph/Imm:         Endocrine:           Physical Exam:  Vitals: /70   Pulse 69   Ht 1.88 m (6' 2\")   Wt 95.3 kg (210 lb 3.2 oz)   SpO2 95%   BMI 26.99 kg/m      Constitutional:  cooperative, alert and oriented, well developed, well nourished, in no acute distress        Skin:  warm and dry to the touch, no apparent skin lesions or masses noted          Head:  normocephalic, no masses or lesions        Eyes:  pupils equal and round, conjunctivae and lids unremarkable, sclera white, no xanthalasma, EOMS intact, no nystagmus        Lymph:      ENT:  no pallor or cyanosis        Neck:  carotid pulses are full and equal bilaterally, JVP normal, no carotid bruit        Respiratory:  normal breath sounds, clear to auscultation, normal A-P diameter, normal symmetry, normal respiratory excursion, no use of accessory muscles         Cardiac: regular rhythm;normal S1 and S2;no S3 or S4;apical impulse not displaced;no murmurs, gallops or rubs detected occasional premature beats              pulses full and equal                                        GI:  not assessed this visit        Extremities and Muscular " Skeletal:  no deformities, clubbing, cyanosis, erythema observed;no edema              Neurological:  no gross motor deficits;affect appropriate        Psych:  Alert and Oriented x 3        CC  Ángel Amaro MD  [address]  [place] [id number]

## 2022-10-18 NOTE — LETTER
10/18/2022    Eduardo Mckinnon PA-C  [address] [facility] [address]    RE: Rickey Butt       Dear Colleague,     I had the pleasure of seeing Rickey Butt in the [facility].  HPI and Plan:   See dictation          Orders Placed This Encounter   Procedures     Basic metabolic panel     TSH with free T4 reflex     Magnesium     Follow-Up with Cardiology     EKG 12-lead complete w/read - Clinics (performed today)     EKG 12-lead complete w/read - Clinics (to be scheduled)     Holter Monitor 24 hour Adult Pediatric       Orders Placed This Encounter   Medications     metoprolol succinate ER (TOPROL XL) 25 MG 24 hr tablet     Sig: Take 1 tablet (25 mg) by mouth daily At bedtime     Dispense:  90 tablet     Refill:  3       Medications Discontinued During This Encounter   Medication Reason     metoprolol succinate ER (TOPROL-XL) 25 MG 24 hr tablet Reorder         Encounter Diagnoses   Name Primary?     Heart palpitations      PVC's (premature ventricular contractions)      Paroxysmal ventricular tachycardia        CURRENT MEDICATIONS:  Current Outpatient Medications   Medication Sig Dispense Refill     fluticasone (FLONASE) 50 MCG/ACT spray Spray 1-2 sprays into both nostrils daily (Patient taking differently: Spray 1-2 sprays into both nostrils as needed) 1 Bottle 0     metoprolol succinate ER (TOPROL XL) 25 MG 24 hr tablet Take 1 tablet (25 mg) by mouth daily At bedtime 90 tablet 3     sertraline (ZOLOFT) 25 MG tablet TAKE 1 AND 1/2 TABLETS BY MOUTH DAILY 135 tablet 3     FLUCELVAX QUADRIVALENT 0.5 ML AL  (Patient not taking: No sig reported)         ALLERGIES     Allergies   Allergen Reactions     Seasonal Allergies        PAST MEDICAL HISTORY:  Past Medical History:   Diagnosis Date     CARDIOVASCULAR SCREENING; LDL GOAL LESS THAN 160 7/18/2013     Family hx of colon cancer 7/18/2013     Family hx of melanoma 7/18/2013     Generalised anxiety disorder 7/18/2013     Heart  "palpitations      Mild major depression (H) 7/18/2013       PAST SURGICAL HISTORY:  Past Surgical History:   Procedure Laterality Date     APPENDECTOMY  2007     HERNIA REPAIR         FAMILY HISTORY:  Family History   Problem Relation Age of Onset     Cancer Mother         melanoma       SOCIAL HISTORY:  Social History     Socioeconomic History     Marital status:      Spouse name: None     Number of children: None     Years of education: None     Highest education level: None   Tobacco Use     Smoking status: Never     Smokeless tobacco: Never   Substance and Sexual Activity     Alcohol use: Yes     Comment: five bottles of beer per week     Drug use: No     Sexual activity: Yes     Partners: Female   Other Topics Concern     Parent/sibling w/ CABG, MI or angioplasty before 65F 55M? No       Review of Systems:  Skin:          Eyes:         ENT:         Respiratory:          Cardiovascular:         Gastroenterology:        Genitourinary:         Musculoskeletal:         Neurologic:         Psychiatric:         Heme/Lymph/Imm:         Endocrine:           Physical Exam:  Vitals: /70   Pulse 69   Ht 1.88 m (6' 2\")   Wt 95.3 kg (210 lb 3.2 oz)   SpO2 95%   BMI 26.99 kg/m      Constitutional:  cooperative, alert and oriented, well developed, well nourished, in no acute distress        Skin:  warm and dry to the touch, no apparent skin lesions or masses noted          Head:  normocephalic, no masses or lesions        Eyes:  pupils equal and round, conjunctivae and lids unremarkable, sclera white, no xanthalasma, EOMS intact, no nystagmus        Lymph:      ENT:  no pallor or cyanosis        Neck:  carotid pulses are full and equal bilaterally, JVP normal, no carotid bruit        Respiratory:  normal breath sounds, clear to auscultation, normal A-P diameter, normal symmetry, normal respiratory excursion, no use of accessory muscles         Cardiac: regular rhythm;normal S1 and S2;no S3 or S4;apical " impulse not displaced;no murmurs, gallops or rubs detected occasional premature beats              pulses full and equal                                        GI:  not assessed this visit        Extremities and Muscular Skeletal:  no deformities, clubbing, cyanosis, erythema observed;no edema              Neurological:  no gross motor deficits;affect appropriate        Psych:  Alert and Oriented x 3        CC  Ángel Amaro MD  [address]  [place] [address]    Thank you for allowing me to participate in the care of your patient.      Sincerely,     Ángel Amaro MD, MD     [facility]

## 2022-10-20 ENCOUNTER — TELEPHONE (OUTPATIENT)
Dept: CARDIOLOGY | Facility: CLINIC | Age: 47
End: 2022-10-20

## 2022-10-20 NOTE — TELEPHONE ENCOUNTER
"Reviewed labs showing   Latest Reference Range & Units 10/18/22 09:36   Sodium 136 - 145 mmol/L 142   Potassium 3.4 - 5.3 mmol/L 4.1   Chloride 98 - 107 mmol/L 107   Carbon Dioxide (CO2) 22 - 29 mmol/L 25   Urea Nitrogen 6.0 - 20.0 mg/dL 11.9   Creatinine 0.67 - 1.17 mg/dL 0.89   GFR Estimate >60 mL/min/1.73m2 >90   Calcium 8.6 - 10.0 mg/dL 9.6   Anion Gap 7 - 15 mmol/L 10   Magnesium 1.7 - 2.3 mg/dL 2.1   Glucose 70 - 99 mg/dL 99   TSH 0.30 - 4.20 uIU/mL 0.67     Reviewed Holter showing       Per office note dated 10/18/22, Dr. Amaro recommended, \"We will obtain a 24-hour Holter monitor to determine if determine the percentage of his beats which are PVCs.  Secondly, we will check a basic metabolic profile, magnesium and TSH today to ensure that there is no electrolyte or hormonal abnormalities, which could be causing this problem.  We will be in contact with the patient with the results of his test.\"     Will message Dr. Amaro to review. Suleiman KIRKLAND   "

## 2022-10-20 NOTE — TELEPHONE ENCOUNTER
Labs all normal. Single PVC with palpitations.Altogether 6 events and only one with PVCs Symptoms not consistently correlating with arrhythmiasand vast majority of PVCs are asymptomatic. Would let patient know. Thx

## 2022-10-21 NOTE — TELEPHONE ENCOUNTER
Called pt with results & recommendations from Dr. Amaro. Pt denies any questions at present. Suleiman KIRKLAND

## 2022-11-19 ENCOUNTER — E-VISIT (OUTPATIENT)
Dept: URGENT CARE | Facility: CLINIC | Age: 47
End: 2022-11-19
Payer: COMMERCIAL

## 2022-11-19 DIAGNOSIS — Z20.822 SUSPECTED COVID-19 VIRUS INFECTION: Primary | ICD-10-CM

## 2022-11-19 PROCEDURE — 99207 PR NO BILLABLE SERVICE THIS VISIT: CPT | Performed by: INTERNAL MEDICINE

## 2022-11-19 NOTE — PATIENT INSTRUCTIONS
Dear Rickey,      Based on your responses, you may have coronavirus (COVID-19). I've ordered a covid pcr test for you, which is more accurate than the home tests.  If this is negative, you may have influenza but as you have had symptoms for more than 48 hours, it is not beneficial to test for influenza as there is no treatment.     Will I be tested for COVID-19?  We would like to test you for COVID. I have placed orders for this test.     To schedule: go to your [employer] home page and scroll down to the section that says  You have an appointment that needs to be scheduled  and click the large green button that says  Schedule Now  and follow the steps to find the next available openings.    If you are unable to complete these [employer] scheduling steps, please call [phone] to schedule your testing.     These guidelines are for isolating before returning to work, school or .     For employers, schools and day cares: This is an official notice for this person s medical guidelines for returning in-person.     For health care sites: The CDC gives different isolation and quarantine guidelines for healthcare sites, please check with these sites before arriving.     How do I self-isolate?  You isolate when you have symptoms of COVID or a test shows you have COVID, even if you don t have symptoms.     If you DO have symptoms:  o Stay home and away from others  - For at least 5 days after your symptoms started, AND   - You are fever free for 24 hours (with no medicine that reduces fever), AND  - Your other symptoms are better.  o Wear a mask for 10 full days any time you are around others.    If you DON T have symptoms:  o Stay at home and away from others for at least 5 days after your positive test.  o Wear a mask for 10 full days any time you are around others.    How can I take care of myself?  Over the counter medications may help with your symptoms such as runny or stuffy nose, cough, chills, or fever.  Talk  to your care team about your options.     Some people are at high risk of severe illness (for example, you have a weak immune system, you re 65 years or older, or you have certain medical problems). If your risk is high and your symptoms started in the last 5 to 7 days, we strongly recommend for you to get COVID treatment as soon as possible. Paxlovid, Molnupiravir and the monoclonal antibody treatments are proven safe and effective, make you feel better faster, and prevent hospitalization and death.       To schedule an appointment to discuss COVID treatment, request an appointment on [employer] (select  COVID-19 Treatment ) or call [facility] ([phone]), press 7.      Get lots of rest. Drink extra fluids (unless a doctor has told you not to)    Take Tylenol (acetaminophen) or ibuprofen for fever or pain. If you have liver or kidney problems, ask your family doctor if it's okay to take Tylenol or ibuprofen    Take over the counter medications for your symptoms, as directed by your doctor. You may also talk to your pharmacist.      If you have other health problems (like cancer, heart failure, an organ transplant or severe kidney disease): Call your specialty clinic if you don't feel better in the next 2 days.    Know when to call 911. Emergency warning signs include:  o Trouble breathing or shortness of breath  o Pain or pressure in the chest that doesn't go away  o Feeling confused like you haven't felt before, or not being able to wake up  o Bluish-colored lips or face    Where can I get more information?    [facility] - About COVID-19: [email or web address]     CDC - What to Do If You're Sick: https://www.cdc.gov/coronavirus/2019-ncov/if-you-are-sick/index.html     CDC - Quarantine & Isolation: https://www.cdc.gov/coronavirus/2019-ncov/your-health/quarantine-isolation.html     [facility] clinical trials (COVID-19 research studies):  [email or web address]-clinical-[email or web address]    Below are the COVID-19 hotlines at the [place] Department of [facility] ([facility]). Interpreters are available.  o For health questions: Call 652-335-5781 or 1-476.631.7546 (7 a.m. to 7 p.m.)  o For questions about schools and childcare: Call 287-227-2972 or 1-537.700.8643 (7 a.m. to 7 p.m.)

## 2022-11-21 ENCOUNTER — LAB (OUTPATIENT)
Dept: URGENT CARE | Facility: URGENT CARE | Age: 47
End: 2022-11-21
Attending: INTERNAL MEDICINE
Payer: COMMERCIAL

## 2022-11-21 DIAGNOSIS — Z20.822 SUSPECTED COVID-19 VIRUS INFECTION: ICD-10-CM

## 2022-11-21 LAB — SARS-COV-2 RNA RESP QL NAA+PROBE: POSITIVE

## 2022-11-21 PROCEDURE — U0003 INFECTIOUS AGENT DETECTION BY NUCLEIC ACID (DNA OR RNA); SEVERE ACUTE RESPIRATORY SYNDROME CORONAVIRUS 2 (SARS-COV-2) (CORONAVIRUS DISEASE [COVID-19]), AMPLIFIED PROBE TECHNIQUE, MAKING USE OF HIGH THROUGHPUT TECHNOLOGIES AS DESCRIBED BY CMS-2020-01-R: HCPCS

## 2022-11-21 PROCEDURE — U0005 INFEC AGEN DETEC AMPLI PROBE: HCPCS

## 2022-11-23 ENCOUNTER — VIRTUAL VISIT (OUTPATIENT)
Dept: FAMILY MEDICINE | Facility: CLINIC | Age: 47
End: 2022-11-23
Payer: COMMERCIAL

## 2022-11-23 DIAGNOSIS — U07.1 INFECTION DUE TO 2019 NOVEL CORONAVIRUS: Primary | ICD-10-CM

## 2022-11-23 PROCEDURE — 99213 OFFICE O/P EST LOW 20 MIN: CPT | Mod: CS | Performed by: PREVENTIVE MEDICINE

## 2022-11-23 RX ORDER — ALBUTEROL SULFATE 90 UG/1
2 AEROSOL, METERED RESPIRATORY (INHALATION) EVERY 6 HOURS PRN
Qty: 18 G | Refills: 0 | Status: SHIPPED | OUTPATIENT
Start: 2022-11-23 | End: 2023-02-07

## 2022-11-23 ASSESSMENT — PATIENT HEALTH QUESTIONNAIRE - PHQ9
10. IF YOU CHECKED OFF ANY PROBLEMS, HOW DIFFICULT HAVE THESE PROBLEMS MADE IT FOR YOU TO DO YOUR WORK, TAKE CARE OF THINGS AT HOME, OR GET ALONG WITH OTHER PEOPLE: NOT DIFFICULT AT ALL
SUM OF ALL RESPONSES TO PHQ QUESTIONS 1-9: 0
SUM OF ALL RESPONSES TO PHQ QUESTIONS 1-9: 0

## 2022-11-23 NOTE — PROGRESS NOTES
"Rickey is a 47 year old who is being evaluated via a billable video visit.      How would you like to obtain your AVS? MyChart  If the video visit is dropped, the invitation should be resent by: Send to e-mail at: [email or web address]  Will anyone else be joining your video visit? No          Assessment & Plan     Infection due to 2019 novel coronavirus  -symptoms for 2 weeks  -Covid test positive on 11/21/22  -hydration and monitor temperature  -no fever in the last few days   -await results of imaging  - REVIEW OF HEALTH MAINTENANCE PROTOCOL ORDERS  - albuterol (PROAIR HFA/PROVENTIL HFA/VENTOLIN HFA) 108 (90 Base) MCG/ACT inhaler  Dispense: 18 g; Refill: 0  - XR Chest 2 Views  -Can takeover the counter Mucinex as an expectorant  -ED precautions: chest pain, dehydration, sudden shortness of breath       Ordering of each unique test  Prescription drug management  15 minutes spent on the date of the encounter doing chart review, history and exam, documentation and further activities per the note       BMI:   Estimated body mass index is 26.99 kg/m  as calculated from the following:    Height as of 10/18/22: 1.88 m (6' 2\").    Weight as of 10/18/22: 95.3 kg (210 lb 3.2 oz).       Return in about 1 week (around 11/30/2022) if symptoms worsen or fail to improve.    Clementina Marian MD MPH    [facility]    Benito Lang is a 47 year old presenting for the following health issues:  Covid 19 Treatment (Tested positive on the 21st)      HPI       COVID-19 Symptom Review  How many days ago did these symptoms start? 2 weeks ago  Congested for 10 days in the chest  Started taking Flonase    Are any of the following symptoms significant for you?    New or worsening difficulty breathing? No    Worsening cough? Yes, it's a dry cough.     Fever or chills? No    Headache: No    Sore throat: YES    Chest pain: YES- tightness.    Diarrhea: No    Body aches? YES, dullness    What treatments has patient " tried? Nasal steroid spray   Does patient live in a nursing home, group home, or shelter? No  Does patient have a way to get food/medications during quarantined? Yes, I have a friend or family member who can help me.              Review of Systems   Constitutional, HEENT, cardiovascular, pulmonary, gi and gu systems are negative, except as otherwise noted.      Objective           Vitals:  No vitals were obtained today due to virtual visit.    Physical Exam   GENERAL: Healthy, alert and no distress  EYES: Eyes grossly normal to inspection.  No discharge or erythema, or obvious scleral/conjunctival abnormalities.  RESP: No audible wheeze, cough, or visible cyanosis.  No visible retractions or increased work of breathing.    SKIN: Visible skin clear. No significant rash, abnormal pigmentation or lesions.  NEURO: Cranial nerves grossly intact.  Mentation and speech appropriate for age.  PSYCH: Mentation appears normal, affect normal/bright, judgement and insight intact, normal speech and appearance well-groomed.      Video-Visit Details    Video Start Time: 4:24 PM    Type of service:  Video Visit    Video End Time:4:33 PM    Originating Location (pt. Location): Home        Distant Location (provider location):  On-site    Platform used for Video Visit: Mir[employer]    Answers for HPI/ROS submitted by the patient on 11/23/2022  If you checked off any problems, how difficult have these problems made it for you to do your work, take care of things at home, or get along with other people?: Not difficult at all  PHQ9 TOTAL SCORE: 0

## 2022-11-25 ENCOUNTER — HOSPITAL ENCOUNTER (OUTPATIENT)
Dept: GENERAL RADIOLOGY | Facility: CLINIC | Age: 47
Discharge: HOME | End: 2022-11-25
Attending: PREVENTIVE MEDICINE | Admitting: PREVENTIVE MEDICINE
Payer: COMMERCIAL

## 2022-11-25 DIAGNOSIS — U07.1 INFECTION DUE TO 2019 NOVEL CORONAVIRUS: ICD-10-CM

## 2022-11-25 PROCEDURE — 71046 X-RAY EXAM CHEST 2 VIEWS: CPT

## 2022-11-25 NOTE — RESULT ENCOUNTER NOTE
Rickey, your test results were within normal limits.  Chest X ray is not showing any pneumonias or fluid in the lungs.  Plan of care and follow up as discussed.     Please do not hesitate to call us at [phone] if you have any questions or concerns.    Thank you,    Clementina Marina MD MPH

## 2022-11-28 ENCOUNTER — TRANSFERRED RECORDS (OUTPATIENT)
Dept: HEALTH INFORMATION MANAGEMENT | Facility: CLINIC | Age: 47
End: 2022-11-28

## 2023-02-01 SDOH — HEALTH STABILITY: PHYSICAL HEALTH: ON AVERAGE, HOW MANY DAYS PER WEEK DO YOU ENGAGE IN MODERATE TO STRENUOUS EXERCISE (LIKE A BRISK WALK)?: 2 DAYS

## 2023-02-01 SDOH — ECONOMIC STABILITY: INCOME INSECURITY: HOW HARD IS IT FOR YOU TO PAY FOR THE VERY BASICS LIKE FOOD, HOUSING, MEDICAL CARE, AND HEATING?: NOT HARD AT ALL

## 2023-02-01 SDOH — ECONOMIC STABILITY: FOOD INSECURITY: WITHIN THE PAST 12 MONTHS, YOU WORRIED THAT YOUR FOOD WOULD RUN OUT BEFORE YOU GOT MONEY TO BUY MORE.: NEVER TRUE

## 2023-02-01 SDOH — HEALTH STABILITY: PHYSICAL HEALTH: ON AVERAGE, HOW MANY MINUTES DO YOU ENGAGE IN EXERCISE AT THIS LEVEL?: 10 MIN

## 2023-02-01 SDOH — ECONOMIC STABILITY: FOOD INSECURITY: WITHIN THE PAST 12 MONTHS, THE FOOD YOU BOUGHT JUST DIDN'T LAST AND YOU DIDN'T HAVE MONEY TO GET MORE.: NEVER TRUE

## 2023-02-01 SDOH — ECONOMIC STABILITY: TRANSPORTATION INSECURITY
IN THE PAST 12 MONTHS, HAS LACK OF TRANSPORTATION KEPT YOU FROM MEETINGS, WORK, OR FROM GETTING THINGS NEEDED FOR DAILY LIVING?: NO

## 2023-02-01 SDOH — ECONOMIC STABILITY: INCOME INSECURITY: IN THE LAST 12 MONTHS, WAS THERE A TIME WHEN YOU WERE NOT ABLE TO PAY THE MORTGAGE OR RENT ON TIME?: NO

## 2023-02-01 SDOH — ECONOMIC STABILITY: TRANSPORTATION INSECURITY
IN THE PAST 12 MONTHS, HAS THE LACK OF TRANSPORTATION KEPT YOU FROM MEDICAL APPOINTMENTS OR FROM GETTING MEDICATIONS?: NO

## 2023-02-01 ASSESSMENT — LIFESTYLE VARIABLES
HOW OFTEN DO YOU HAVE A DRINK CONTAINING ALCOHOL: 2-4 TIMES A MONTH
HOW OFTEN DO YOU HAVE SIX OR MORE DRINKS ON ONE OCCASION: NEVER
SKIP TO QUESTIONS 9-10: 1
HOW MANY STANDARD DRINKS CONTAINING ALCOHOL DO YOU HAVE ON A TYPICAL DAY: 1 OR 2
AUDIT-C TOTAL SCORE: 2

## 2023-02-01 ASSESSMENT — SOCIAL DETERMINANTS OF HEALTH (SDOH)
IN A TYPICAL WEEK, HOW MANY TIMES DO YOU TALK ON THE PHONE WITH FAMILY, FRIENDS, OR NEIGHBORS?: ONCE A WEEK
DO YOU BELONG TO ANY CLUBS OR ORGANIZATIONS SUCH AS CHURCH GROUPS UNIONS, FRATERNAL OR ATHLETIC GROUPS, OR SCHOOL GROUPS?: NO
HOW OFTEN DO YOU GET TOGETHER WITH FRIENDS OR RELATIVES?: ONCE A WEEK
HOW OFTEN DO YOU ATTEND CHURCH OR RELIGIOUS SERVICES?: 1 TO 4 TIMES PER YEAR

## 2023-02-01 ASSESSMENT — ENCOUNTER SYMPTOMS
PALPITATIONS: 0
SHORTNESS OF BREATH: 0
MYALGIAS: 0
ARTHRALGIAS: 0
HEADACHES: 0
SORE THROAT: 0
DIZZINESS: 0
HEMATURIA: 0
HEARTBURN: 0
DYSURIA: 0
WEAKNESS: 0
HEMATOCHEZIA: 0
NERVOUS/ANXIOUS: 0
ABDOMINAL PAIN: 0
JOINT SWELLING: 0
PARESTHESIAS: 0
CHILLS: 0
EYE PAIN: 0
FEVER: 0
COUGH: 0
NAUSEA: 0
CONSTIPATION: 0
FREQUENCY: 0
DIARRHEA: 0

## 2023-02-06 ASSESSMENT — PATIENT HEALTH QUESTIONNAIRE - PHQ9
SUM OF ALL RESPONSES TO PHQ QUESTIONS 1-9: 0
10. IF YOU CHECKED OFF ANY PROBLEMS, HOW DIFFICULT HAVE THESE PROBLEMS MADE IT FOR YOU TO DO YOUR WORK, TAKE CARE OF THINGS AT HOME, OR GET ALONG WITH OTHER PEOPLE: NOT DIFFICULT AT ALL
SUM OF ALL RESPONSES TO PHQ QUESTIONS 1-9: 0

## 2023-02-06 ASSESSMENT — ANXIETY QUESTIONNAIRES
7. FEELING AFRAID AS IF SOMETHING AWFUL MIGHT HAPPEN: NOT AT ALL
7. FEELING AFRAID AS IF SOMETHING AWFUL MIGHT HAPPEN: NOT AT ALL
3. WORRYING TOO MUCH ABOUT DIFFERENT THINGS: NOT AT ALL
2. NOT BEING ABLE TO STOP OR CONTROL WORRYING: NOT AT ALL
8. IF YOU CHECKED OFF ANY PROBLEMS, HOW DIFFICULT HAVE THESE MADE IT FOR YOU TO DO YOUR WORK, TAKE CARE OF THINGS AT HOME, OR GET ALONG WITH OTHER PEOPLE?: NOT DIFFICULT AT ALL
IF YOU CHECKED OFF ANY PROBLEMS ON THIS QUESTIONNAIRE, HOW DIFFICULT HAVE THESE PROBLEMS MADE IT FOR YOU TO DO YOUR WORK, TAKE CARE OF THINGS AT HOME, OR GET ALONG WITH OTHER PEOPLE: NOT DIFFICULT AT ALL
1. FEELING NERVOUS, ANXIOUS, OR ON EDGE: NOT AT ALL
GAD7 TOTAL SCORE: 0
GAD7 TOTAL SCORE: 0
5. BEING SO RESTLESS THAT IT IS HARD TO SIT STILL: NOT AT ALL
4. TROUBLE RELAXING: NOT AT ALL
6. BECOMING EASILY ANNOYED OR IRRITABLE: NOT AT ALL
GAD7 TOTAL SCORE: 0

## 2023-02-07 ENCOUNTER — OFFICE VISIT (OUTPATIENT)
Dept: FAMILY MEDICINE | Facility: CLINIC | Age: 48
End: 2023-02-07
Payer: COMMERCIAL

## 2023-02-07 VITALS
OXYGEN SATURATION: 98 % | BODY MASS INDEX: 26.59 KG/M2 | DIASTOLIC BLOOD PRESSURE: 73 MMHG | TEMPERATURE: 98.2 F | HEIGHT: 74 IN | WEIGHT: 207.2 LBS | HEART RATE: 65 BPM | RESPIRATION RATE: 20 BRPM | SYSTOLIC BLOOD PRESSURE: 123 MMHG

## 2023-02-07 DIAGNOSIS — Z00.00 ROUTINE GENERAL MEDICAL EXAMINATION AT A HEALTH CARE FACILITY: ICD-10-CM

## 2023-02-07 DIAGNOSIS — Z12.11 SCREENING FOR COLON CANCER: ICD-10-CM

## 2023-02-07 DIAGNOSIS — F41.1 GENERALIZED ANXIETY DISORDER: ICD-10-CM

## 2023-02-07 DIAGNOSIS — F33.42 RECURRENT MAJOR DEPRESSIVE DISORDER, IN FULL REMISSION (H): ICD-10-CM

## 2023-02-07 PROCEDURE — 99396 PREV VISIT EST AGE 40-64: CPT | Performed by: PHYSICIAN ASSISTANT

## 2023-02-07 RX ORDER — SERTRALINE HYDROCHLORIDE 25 MG/1
TABLET, FILM COATED ORAL
Qty: 135 TABLET | Refills: 3 | Status: SHIPPED | OUTPATIENT
Start: 2023-02-07 | End: 2024-02-06

## 2023-02-07 ASSESSMENT — ENCOUNTER SYMPTOMS
ABDOMINAL PAIN: 0
FREQUENCY: 0
DIARRHEA: 0
NERVOUS/ANXIOUS: 0
WEAKNESS: 0
HEARTBURN: 0
FEVER: 0
HEADACHES: 0
EYE PAIN: 0
SHORTNESS OF BREATH: 0
CHILLS: 0
HEMATOCHEZIA: 0
HEMATURIA: 0
SORE THROAT: 0
NAUSEA: 0
CONSTIPATION: 0
COUGH: 0
PALPITATIONS: 0
MYALGIAS: 0
JOINT SWELLING: 0
PARESTHESIAS: 0
DIZZINESS: 0
ARTHRALGIAS: 0
DYSURIA: 0

## 2023-02-07 NOTE — PROGRESS NOTES
SUBJECTIVE:   CC: Rickey is an 47 year old who presents for preventative health visit.   Patient has been advised of split billing requirements and indicates understanding: Yes  Healthy Habits:     Getting at least 3 servings of Calcium per day:  NO    Bi-annual eye exam:  Yes    Dental care twice a year:  Yes    Sleep apnea or symptoms of sleep apnea:  None    Diet:  Regular (no restrictions)    Frequency of exercise:  1 day/week    Duration of exercise:  Other    Taking medications regularly:  Yes    Medication side effects:  None    PHQ-2 Total Score: 0    Additional concerns today:  Yes      Depression and Anxiety Follow-Up    How are you doing with your depression since your last visit? No change    How are you doing with your anxiety since your last visit?  No change    Are you having other symptoms that might be associated with depression or anxiety? No    Have you had a significant life event? No     Do you have any concerns with your use of alcohol or other drugs? No    Social History     Tobacco Use     Smoking status: Never     Smokeless tobacco: Never   Vaping Use     Vaping Use: Never used   Substance Use Topics     Alcohol use: Yes     Comment: five bottles of beer per week     Drug use: No     PHQ 12/1/2021 11/23/2022 2/6/2023   PHQ-9 Total Score 0 0 0   Q9: Thoughts of better off dead/self-harm past 2 weeks Not at all Not at all Not at all     JENIFER-7 SCORE 6/7/2021 12/1/2021 2/6/2023   Total Score - - -   Total Score 1 (minimal anxiety) - 0 (minimal anxiety)   Total Score 1 1 0     Last PHQ-9 2/6/2023   1.  Little interest or pleasure in doing things 0   2.  Feeling down, depressed, or hopeless 0   3.  Trouble falling or staying asleep, or sleeping too much 0   4.  Feeling tired or having little energy 0   5.  Poor appetite or overeating 0   6.  Feeling bad about yourself 0   7.  Trouble concentrating 0   8.  Moving slowly or restless 0   Q9: Thoughts of better off dead/self-harm past 2 weeks 0    PHQ-9 Total Score 0   Difficulty at work, home, or with people -     JENIFER-7  2/6/2023   1. Feeling nervous, anxious, or on edge 0   2. Not being able to stop or control worrying 0   3. Worrying too much about different things 0   4. Trouble relaxing 0   5. Being so restless that it is hard to sit still 0   6. Becoming easily annoyed or irritable 0   7. Feeling afraid, as if something awful might happen 0   JENIFER-7 Total Score 0   If you checked any problems, how difficult have they made it for you to do your work, take care of things at home, or get along with other people? Not difficult at all       Suicide Assessment Five-step Evaluation and Treatment (SAFE-T)  =    Today's PHQ-2 Score:   PHQ-2 ( 1999 Pfizer) 2/1/2023   Q1: Little interest or pleasure in doing things 0   Q2: Feeling down, depressed or hopeless 0   PHQ-2 Score 0   PHQ-2 Total Score (12-17 Years)- Positive if 3 or more points; Administer PHQ-A if positive -   Q1: Little interest or pleasure in doing things Not at all   Q2: Feeling down, depressed or hopeless Not at all   PHQ-2 Score 0       Have you ever done Advance Care Planning? (For example, a Health Directive, POLST, or a discussion with a medical provider or your loved ones about your wishes): Yes, patient states has an Advance Care Planning document and will bring a copy to the clinic.    Social History     Tobacco Use     Smoking status: Never     Smokeless tobacco: Never   Substance Use Topics     Alcohol use: Yes     Comment: five bottles of beer per week     If you drink alcohol do you typically have >3 drinks per day or >7 drinks per week? No    Alcohol Use 2/7/2023   Prescreen: >3 drinks/day or >7 drinks/week? -   Prescreen: >3 drinks/day or >7 drinks/week? No       Last PSA: No results found for: PSA    Reviewed orders with patient. Reviewed health maintenance and updated orders accordingly - Yes  BP Readings from Last 3 Encounters:   02/07/23 123/73   10/18/22 112/70   12/07/21 106/62     Wt Readings from Last 3 Encounters:   02/07/23 94 kg (207 lb 3.2 oz)   10/18/22 95.3 kg (210 lb 3.2 oz)   12/07/21 96.2 kg (212 lb)                  Patient Active Problem List   Diagnosis     CARDIOVASCULAR SCREENING; LDL GOAL LESS THAN 160     Family hx of colon cancer     Family hx of melanoma     Mild major depression (H)     Generalized anxiety disorder     Seasonal allergic rhinitis     Heart palpitations     Paroxysmal ventricular tachycardia     Recurrent major depressive disorder, in full remission (H)     Past Surgical History:   Procedure Laterality Date     APPENDECTOMY  2007     HERNIA REPAIR         Social History     Tobacco Use     Smoking status: Never     Smokeless tobacco: Never   Substance Use Topics     Alcohol use: Yes     Comment: five bottles of beer per week     Family History   Problem Relation Age of Onset     Cancer Mother         melanoma     Prostate Cancer Father          Current Outpatient Medications   Medication Sig Dispense Refill     fluticasone (FLONASE) 50 MCG/ACT spray Spray 1-2 sprays into both nostrils daily (Patient taking differently: Spray 1-2 sprays into both nostrils as needed) 1 Bottle 0     metoprolol succinate ER (TOPROL XL) 25 MG 24 hr tablet Take 1 tablet (25 mg) by mouth daily At bedtime 90 tablet 3     sertraline (ZOLOFT) 25 MG tablet TAKE 1 AND 1/2 TABLETS BY MOUTH DAILY 135 tablet 3     Allergies   Allergen Reactions     Seasonal Allergies      Recent Labs   Lab Test 10/18/22  0936 12/07/20  1035 02/27/19  0723 09/11/18  1150 09/09/18  1706   LDL  --  90 109*  --   --    HDL  --  37* 46  --   --    TRIG  --  185* 99  --   --    CR 0.89  --   --   --  0.91   GFRESTIMATED >90  --   --   --  >90   GFRESTBLACK  --   --   --   --  >90   POTASSIUM 4.1  --   --   --  4.1   TSH 0.67  --   --  0.56  --         Reviewed and updated as needed this visit by clinical staff   Tobacco  Allergies  Meds  Problems  Med Hx  Surg Hx  Fam Hx          Reviewed and updated  "as needed this visit by Provider   Tobacco  Allergies  Meds  Problems  Med Hx  Surg Hx  Fam Hx         Past Medical History:   Diagnosis Date     CARDIOVASCULAR SCREENING; LDL GOAL LESS THAN 160 07/18/2013     Family hx of colon cancer 07/18/2013     Family hx of melanoma 07/18/2013     Generalised anxiety disorder 07/18/2013     Heart palpitations      Mild major depression (H) 07/18/2013      Past Surgical History:   Procedure Laterality Date     APPENDECTOMY  2007     HERNIA REPAIR         Review of Systems   Constitutional: Negative for chills and fever.   HENT: Negative for congestion, ear pain, hearing loss and sore throat.    Eyes: Negative for pain and visual disturbance.   Respiratory: Negative for cough and shortness of breath.    Cardiovascular: Negative for chest pain, palpitations and peripheral edema.   Gastrointestinal: Negative for abdominal pain, constipation, diarrhea, heartburn, hematochezia and nausea.   Genitourinary: Negative for dysuria, frequency, genital sores, hematuria, impotence, penile discharge and urgency.   Musculoskeletal: Negative for arthralgias, joint swelling and myalgias.   Skin: Negative for rash.   Neurological: Negative for dizziness, weakness, headaches and paresthesias.   Psychiatric/Behavioral: Negative for mood changes. The patient is not nervous/anxious.          OBJECTIVE:   /73 (BP Location: Right arm, Patient Position: Sitting, Cuff Size: Adult Large)   Pulse 65   Temp 98.2  F (36.8  C) (Oral)   Resp 20   Ht 1.88 m (6' 2\")   Wt 94 kg (207 lb 3.2 oz)   SpO2 98%   BMI 26.60 kg/m      Physical Exam  GENERAL: healthy, alert and no distress  EYES: Eyes grossly normal to inspection, PERRL and conjunctivae and sclerae normal  HENT: ear canals and TM's normal, nose and mouth without ulcers or lesions  NECK: no adenopathy, no asymmetry, masses, or scars and thyroid normal to palpation  RESP: lungs clear to auscultation - no rales, rhonchi or wheezes  CV: " "regular rate and rhythm, normal S1 S2, no S3 or S4, no murmur, click or rub, no peripheral edema and peripheral pulses strong  ABDOMEN: soft, nontender, no hepatosplenomegaly, no masses and bowel sounds normal  MS: no gross musculoskeletal defects noted, no edema  SKIN: no suspicious lesions or rashes  NEURO: Normal strength and tone, mentation intact and speech normal  PSYCH: mentation appears normal, affect normal/bright  LYMPH: no cervical adenopathy    Diagnostic Test Results:  none     ASSESSMENT/PLAN:   (Z00.00) Routine general medical examination at a health care facility  Comment: stable wellness. Non fasting. Will postpone 1 year given wnl ldl at last check in system  Plan:     (F33.42) Recurrent major depressive disorder, in full remission (H)  Comment: stable on current regimen. Will maintain and recheck annually.   Plan: sertraline (ZOLOFT) 25 MG tablet        Medication use and side effects discussed with the patient. Patient is in complete understanding and agreement with plan.       (F41.1) Generalized anxiety disorder  Comment: as above   Plan: sertraline (ZOLOFT) 25 MG tablet            (Z12.11) Screening for colon cancer  Comment: due.   Plan: Fecal colorectal cancer screen (FIT)              Patient has been advised of split billing requirements and indicates understanding: Yes      COUNSELING:   Reviewed preventive health counseling, as reflected in patient instructions       Regular exercise       Healthy diet/nutrition       Immunizations    Declined: Hepatitis B and Pneumococcal due to Conscientious objector               Colorectal cancer screening       Prostate cancer screening      BMI:   Estimated body mass index is 26.6 kg/m  as calculated from the following:    Height as of this encounter: 1.88 m (6' 2\").    Weight as of this encounter: 94 kg (207 lb 3.2 oz).   Weight management plan: Discussed healthy diet and exercise guidelines      He reports that he has never smoked. He has never " used smokeless tobacco.        Eduardo Mckinnon PA-C  [facility]  Answers for HPI/ROS submitted by the patient on 2/6/2023  If you checked off any problems, how difficult have these problems made it for you to do your work, take care of things at home, or get along with other people?: Not difficult at all  PHQ9 TOTAL SCORE: 0  JENIFER 7 TOTAL SCORE: 0

## 2023-02-08 LAB — HEMOCCULT STL QL IA: NEGATIVE

## 2023-02-08 PROCEDURE — 82274 ASSAY TEST FOR BLOOD FECAL: CPT | Performed by: PHYSICIAN ASSISTANT

## 2023-10-19 NOTE — MR AVS SNAPSHOT
After Visit Summary   5/14/2017    Rickey Butt    MRN: [id number]           Patient Information     Date Of Birth          1975        Visit Information        Provider Department      5/14/2017 10:50 AM Lorin Johnson DO [facility]        Today's Diagnoses     Throat pain    -  1    Seasonal allergic rhinitis, unspecified allergic rhinitis trigger           Follow-ups after your visit        Who to contact     If you have questions or need follow up information about today's clinic visit or your schedule please contact [facility] directly at [phone].  Normal or non-critical lab and imaging results will be communicated to you by Bonegrafixhart, letter or phone within 4 business days after the clinic has received the results. If you do not hear from us within 7 days, please contact the clinic through Bonegrafixhart or phone. If you have a critical or abnormal lab result, we will notify you by phone as soon as possible.  Submit refill requests through Netadmin or call your pharmacy and they will forward the refill request to us. Please allow 3 business days for your refill to be completed.          Additional Information About Your Visit        MyChart Information     Netadmin gives you secure access to your electronic health record. If you see a primary care provider, you can also send messages to your care team and make appointments. If you have questions, please call your primary care clinic.  If you do not have a primary care provider, please call [phone] and they will assist you.        Care EveryWhere ID     This is your Care EveryWhere ID. This could be used by other organizations to access your [facility] medical records  [id number]        Your Vitals Were     Pulse Temperature Pulse Oximetry             66 98.5  F (36.9  C) (Oral) 97%          Blood Pressure from Last 3 Encounters:   05/14/17 110/80   12/26/16 128/70   03/31/16 133/77    Weight  Patient was going to establish care with Dr Oscar but his new patient appointments have been bumped twice. Patient asked to see Bev instead    Patient has been scheduled with Bev Lezama for 4/12/24 and added to the wait list. He has [employer] insurance so he was also given [employer]'s phone number and [employer]'s website   from Last 3 Encounters:   12/26/16 203 lb (92.1 kg)   03/31/16 200 lb (90.7 kg)   12/07/15 194 lb (88 kg)              We Performed the Following     Beta strep group A culture     Rapid strep screen          Today's Medication Changes          These changes are accurate as of: 5/14/17 11:28 AM.  If you have any questions, ask your nurse or doctor.               These medicines have changed or have updated prescriptions.        Dose/Directions    * fluticasone 50 MCG/ACT spray   Commonly known as:  FLONASE   This may have changed:  Another medication with the same name was added. Make sure you understand how and when to take each.   Used for:  Throat pain, Seasonal allergic rhinitis   Changed by:  Eduardo Mckinnon PA-C        Dose:  1-2 spray   Spray 1-2 sprays into both nostrils daily   Quantity:  16 g   Refills:  3       * fluticasone 50 MCG/ACT spray   Commonly known as:  FLONASE   This may have changed:  You were already taking a medication with the same name, and this prescription was added. Make sure you understand how and when to take each.   Used for:  Seasonal allergic rhinitis, unspecified allergic rhinitis trigger   Changed by:  Lorin Johnson DO        Dose:  1-2 spray   Spray 1-2 sprays into both nostrils daily   Quantity:  1 Bottle   Refills:  0       * Notice:  This list has 2 medication(s) that are the same as other medications prescribed for you. Read the directions carefully, and ask your doctor or other care provider to review them with you.         Where to get your medicines      These medications were sent to [facility] [address] [facility] [address]  [address], [facility] [address]     Phone:  [phone]     fluticasone 50 MCG/ACT spray                Primary Care Provider    Physician No Ref-Primary       No address on file        Thank you!     Thank you for choosing [facility]  for your  care. Our goal is always to provide you with excellent care. Hearing back from our patients is one way we can continue to improve our services. Please take a few minutes to complete the written survey that you may receive in the mail after your visit with us. Thank you!             Your Updated Medication List - Protect others around you: Learn how to safely use, store and throw away your medicines at www.disposemymeds.org.          This list is accurate as of: 5/14/17 11:28 AM.  Always use your most recent med list.                   Brand Name Dispense Instructions for use    albuterol 108 (90 BASE) MCG/ACT Inhaler    PROAIR HFA/PROVENTIL HFA/VENTOLIN HFA    1 Inhaler    Inhale 1-2 puffs into the lungs every 4 hours as needed for shortness of breath / dyspnea or wheezing       cetirizine 10 MG tablet    zyrTEC     Take 10 mg by mouth daily       * fluticasone 50 MCG/ACT spray    FLONASE    16 g    Spray 1-2 sprays into both nostrils daily       * fluticasone 50 MCG/ACT spray    FLONASE    1 Bottle    Spray 1-2 sprays into both nostrils daily       sertraline 25 MG tablet    ZOLOFT    30 tablet    Take 1 tablet (25 mg) by mouth daily       * Notice:  This list has 2 medication(s) that are the same as other medications prescribed for you. Read the directions carefully, and ask your doctor or other care provider to review them with you.

## 2023-11-13 DIAGNOSIS — I47.29 PAROXYSMAL VENTRICULAR TACHYCARDIA (H): ICD-10-CM

## 2023-11-13 DIAGNOSIS — I49.3 PVC'S (PREMATURE VENTRICULAR CONTRACTIONS): ICD-10-CM

## 2023-11-13 RX ORDER — METOPROLOL SUCCINATE 25 MG/1
25 TABLET, EXTENDED RELEASE ORAL DAILY
Qty: 90 TABLET | Refills: 0 | Status: SHIPPED | OUTPATIENT
Start: 2023-11-13 | End: 2024-02-06

## 2024-02-06 ENCOUNTER — OFFICE VISIT (OUTPATIENT)
Dept: CARDIOLOGY | Facility: CLINIC | Age: 49
End: 2024-02-06
Payer: COMMERCIAL

## 2024-02-06 VITALS
OXYGEN SATURATION: 96 % | WEIGHT: 217.8 LBS | SYSTOLIC BLOOD PRESSURE: 114 MMHG | BODY MASS INDEX: 27.95 KG/M2 | HEART RATE: 54 BPM | DIASTOLIC BLOOD PRESSURE: 80 MMHG | HEIGHT: 74 IN

## 2024-02-06 DIAGNOSIS — I49.3 PVC'S (PREMATURE VENTRICULAR CONTRACTIONS): ICD-10-CM

## 2024-02-06 DIAGNOSIS — E78.1 HYPERTRIGLYCERIDEMIA: ICD-10-CM

## 2024-02-06 DIAGNOSIS — I47.29 PAROXYSMAL VENTRICULAR TACHYCARDIA (H): Primary | ICD-10-CM

## 2024-02-06 DIAGNOSIS — F33.42 RECURRENT MAJOR DEPRESSIVE DISORDER, IN FULL REMISSION (H): ICD-10-CM

## 2024-02-06 DIAGNOSIS — I47.29 PAROXYSMAL VENTRICULAR TACHYCARDIA (H): ICD-10-CM

## 2024-02-06 DIAGNOSIS — F41.1 GENERALIZED ANXIETY DISORDER: ICD-10-CM

## 2024-02-06 DIAGNOSIS — E78.6 HDL DEFICIENCY: ICD-10-CM

## 2024-02-06 DIAGNOSIS — R00.2 HEART PALPITATIONS: ICD-10-CM

## 2024-02-06 PROCEDURE — 99214 OFFICE O/P EST MOD 30 MIN: CPT | Performed by: INTERNAL MEDICINE

## 2024-02-06 PROCEDURE — 93000 ELECTROCARDIOGRAM COMPLETE: CPT | Performed by: INTERNAL MEDICINE

## 2024-02-06 RX ORDER — METOPROLOL SUCCINATE 25 MG/1
25 TABLET, EXTENDED RELEASE ORAL DAILY
Qty: 90 TABLET | Refills: 4 | Status: SHIPPED | OUTPATIENT
Start: 2024-02-06

## 2024-02-06 RX ORDER — SERTRALINE HYDROCHLORIDE 25 MG/1
TABLET, FILM COATED ORAL
Qty: 135 TABLET | Refills: 0 | Status: SHIPPED | OUTPATIENT
Start: 2024-02-06 | End: 2024-02-13

## 2024-02-06 SDOH — HEALTH STABILITY: PHYSICAL HEALTH: ON AVERAGE, HOW MANY DAYS PER WEEK DO YOU ENGAGE IN MODERATE TO STRENUOUS EXERCISE (LIKE A BRISK WALK)?: 1 DAY

## 2024-02-06 SDOH — HEALTH STABILITY: PHYSICAL HEALTH: ON AVERAGE, HOW MANY MINUTES DO YOU ENGAGE IN EXERCISE AT THIS LEVEL?: 20 MIN

## 2024-02-06 ASSESSMENT — LIFESTYLE VARIABLES
SKIP TO QUESTIONS 9-10: 1
AUDIT-C TOTAL SCORE: 2
HOW OFTEN DO YOU HAVE A DRINK CONTAINING ALCOHOL: 2-4 TIMES A MONTH
HOW MANY STANDARD DRINKS CONTAINING ALCOHOL DO YOU HAVE ON A TYPICAL DAY: 1 OR 2
HOW OFTEN DO YOU HAVE SIX OR MORE DRINKS ON ONE OCCASION: NEVER

## 2024-02-06 ASSESSMENT — SOCIAL DETERMINANTS OF HEALTH (SDOH)
DO YOU BELONG TO ANY CLUBS OR ORGANIZATIONS SUCH AS CHURCH GROUPS UNIONS, FRATERNAL OR ATHLETIC GROUPS, OR SCHOOL GROUPS?: NO
HOW OFTEN DO YOU ATTEND CHURCH OR RELIGIOUS SERVICES?: 1 TO 4 TIMES PER YEAR
IN A TYPICAL WEEK, HOW MANY TIMES DO YOU TALK ON THE PHONE WITH FAMILY, FRIENDS, OR NEIGHBORS?: TWICE A WEEK
HOW OFTEN DO YOU GET TOGETHER WITH FRIENDS OR RELATIVES?: ONCE A WEEK

## 2024-02-06 NOTE — PROGRESS NOTES
HPI and Plan:   It is my pleasure to see your patient Rickey Butt who is a very pleasant 48-year-old patient with a history of palpitations.  These palpitations have been proven to be due to PVCs mainly.  At one stage he was also found to have short episodes of paroxysmal ventricular tachycardia.  Ischemia has been ruled out in the past.  When I saw him in late 2022 approximately a year and 3 months ago, he was complaining of an increased incidence of palpitations.  Holter monitoring demonstrated that he had 4% PVCs and symptoms correlated with single PVCs.  He does take metoprolol which works well for him.  His symptoms have not worsened since I last saw him are now about the same.  We did discuss the triggers for PVCs including the increased production of endogenous adrenaline causing PVCs such as stress and sleeplessness and also sympathomimetic agents such as caffeine.  He is aware of those issues.  His blood pressure is well-controlled today at 114/80 his pulse is 54 bpm.  He is about 10 pounds heavier than he was this time last year.  I do note that in 2020 he had HDL deficiency at 37 and hypertriglyceridemia of 185.  And we did discuss that weight reduction and exercise will significantly improve HDL and triglyceride levels.  EKG today showed that he had a single PVC otherwise the EKG was normal.  I reviewed that EKG myself today    Impression:  1.  Palpitations.  These are stable on metoprolol succinate and he does not have a high PVC burden as mentioned above.  2.  HDL deficiency and hypertriglyceridemia in the past.  Weight gain and lack of exercise will tend to worsen these.  3.  Normotensive.    Plan:  1.  I recommended lifestyle changes to the patient occluding exercise and weight reduction.  Also he should avoid stress, sleeplessness and caffeinated products.  2.  I will see the patient back again in 1 years time and repeat his lipid profile and I will also repeat an EKG.    As always the patient  is been told to contact me if he is any questions or any concerns.    Ángel Amaro MD, FACC, FRCPI      Orders Placed This Encounter   Procedures    Basic metabolic panel    Follow-Up with Cardiology    EKG 12-lead complete w/read - Clinics (to be scheduled)       No orders of the defined types were placed in this encounter.      There are no discontinued medications.      Encounter Diagnoses   Name Primary?    PVC's (premature ventricular contractions)     Paroxysmal ventricular tachycardia (H) Yes    Heart palpitations        CURRENT MEDICATIONS:  Current Outpatient Medications   Medication Sig Dispense Refill    fluticasone (FLONASE) 50 MCG/ACT spray Spray 1-2 sprays into both nostrils daily (Patient taking differently: Spray 1-2 sprays into both nostrils as needed) 1 Bottle 0    metoprolol succinate ER (TOPROL XL) 25 MG 24 hr tablet Take 1 tablet (25 mg) by mouth daily At bedtime 90 tablet 0    sertraline (ZOLOFT) 25 MG tablet TAKE 1 AND 1/2 TABLETS BY MOUTH DAILY 135 tablet 3       ALLERGIES     Allergies   Allergen Reactions    Seasonal Allergies        PAST MEDICAL HISTORY:  Past Medical History:   Diagnosis Date    CARDIOVASCULAR SCREENING; LDL GOAL LESS THAN 160 07/18/2013    Family hx of colon cancer 07/18/2013    Family hx of melanoma 07/18/2013    Generalised anxiety disorder 07/18/2013    Heart palpitations     Mild major depression (H) 07/18/2013    Mild major depression (H24) 07/18/2013       PAST SURGICAL HISTORY:  Past Surgical History:   Procedure Laterality Date    APPENDECTOMY  2007    HERNIA REPAIR         FAMILY HISTORY:  Family History   Problem Relation Age of Onset    Cancer Mother         melanoma    Prostate Cancer Father        SOCIAL HISTORY:  Social History     Socioeconomic History    Marital status:      Spouse name: None    Number of children: None    Years of education: None    Highest education level: None   Tobacco Use    Smoking status: Never    Smokeless tobacco:  Never   Vaping Use    Vaping Use: Never used   Substance and Sexual Activity    Alcohol use: Yes     Comment: About 2 bottles per week    Drug use: No    Sexual activity: Yes     Partners: Female   Other Topics Concern    Parent/sibling w/ CABG, MI or angioplasty before 65F 55M? No     Social Determinants of Health     Financial Resource Strain: Low Risk  (2/1/2023)    Overall Financial Resource Strain (CARDIA)     Difficulty of Paying Living Expenses: Not hard at all   Food Insecurity: No Food Insecurity (2/1/2023)    Hunger Vital Sign     Worried About Running Out of Food in the Last Year: Never true     Ran Out of Food in the Last Year: Never true   Transportation Needs: No Transportation Needs (2/1/2023)    PRAPARE - Transportation     Lack of Transportation (Medical): No     Lack of Transportation (Non-Medical): No   Physical Activity: Insufficiently Active (2/1/2023)    Exercise Vital Sign     Days of Exercise per Week: 2 days     Minutes of Exercise per Session: 10 min   Stress: No Stress Concern Present (2/1/2023)    [place] of [employer]ational [employer] - Occupational Stress Questionnaire     Feeling of Stress : Not at all   Social Connections: Moderately Isolated (2/1/2023)    Social Connection and Isolation Panel [NHANES]     Frequency of Communication with Friends and Family: Once a week     Frequency of Social Gatherings with Friends and Family: Once a week     Attends Tenriism Services: 1 to 4 times per year     Active Member of Clubs or Organizations: No     Marital Status:    Housing Stability: Low Risk  (2/1/2023)    Housing Stability Vital Sign     Unable to Pay for Housing in the Last Year: No     Number of Places Lived in the Last Year: 1     Unstable Housing in the Last Year: No       Review of Systems:  Skin:          Eyes:         ENT:         Respiratory:  Negative       Cardiovascular:    Positive for;palpitations    Gastroenterology:        Genitourinary:         Musculoskeletal:  "        Neurologic:         Psychiatric:         Heme/Lymph/Imm:         Endocrine:           Physical Exam:  Vitals: /80 (BP Location: Right arm, Patient Position: Sitting, Cuff Size: Adult Large)   Pulse 54   Ht 1.88 m (6' 2\")   Wt 98.8 kg (217 lb 12.8 oz)   SpO2 96%   BMI 27.96 kg/m      Constitutional:  cooperative, alert and oriented, well developed, well nourished, in no acute distress overweight      Skin:  warm and dry to the touch, no apparent skin lesions or masses noted          Head:  normocephalic, no masses or lesions        Eyes:  pupils equal and round, conjunctivae and lids unremarkable, sclera white, no xanthalasma, EOMS intact, no nystagmus        Lymph:      ENT:  no pallor or cyanosis        Neck:  carotid pulses are full and equal bilaterally, JVP normal, no carotid bruit        Respiratory:  normal breath sounds, clear to auscultation, normal A-P diameter, normal symmetry, normal respiratory excursion, no use of accessory muscles         Cardiac: regular rhythm;normal S1 and S2;no S3 or S4;apical impulse not displaced;no murmurs, gallops or rubs detected occasional premature beats              pulses full and equal                                        GI:  not assessed this visit        Extremities and Muscular Skeletal:  no deformities, clubbing, cyanosis, erythema observed;no edema              Neurological:  no gross motor deficits;affect appropriate        Psych:  Alert and Oriented x 3        CC  No referring provider defined for this encounter.                "

## 2024-02-06 NOTE — LETTER
2/6/2024    Eduardo Mckinnon PA-C  [address] [facility] [address]    RE: Rickey Butt       Dear Colleague,     I had the pleasure of seeing Rickey Butt in the [facility].  HPI and Plan:   It is my pleasure to see your patient Rickey Butt who is a very pleasant 48-year-old patient with a history of palpitations.  These palpitations have been proven to be due to PVCs mainly.  At one stage he was also found to have short episodes of paroxysmal ventricular tachycardia.  Ischemia has been ruled out in the past.  When I saw him in late 2022 approximately a year and 3 months ago, he was complaining of an increased incidence of palpitations.  Holter monitoring demonstrated that he had 4% PVCs and symptoms correlated with single PVCs.  He does take metoprolol which works well for him.  His symptoms have not worsened since I last saw him are now about the same.  We did discuss the triggers for PVCs including the increased production of endogenous adrenaline causing PVCs such as stress and sleeplessness and also sympathomimetic agents such as caffeine.  He is aware of those issues.  His blood pressure is well-controlled today at 114/80 his pulse is 54 bpm.  He is about 10 pounds heavier than he was this time last year.  I do note that in 2020 he had HDL deficiency at 37 and hypertriglyceridemia of 185.  And we did discuss that weight reduction and exercise will significantly improve HDL and triglyceride levels.  EKG today showed that he had a single PVC otherwise the EKG was normal.  I reviewed that EKG myself today    Impression:  1.  Palpitations.  These are stable on metoprolol succinate and he does not have a high PVC burden as mentioned above.  2.  HDL deficiency and hypertriglyceridemia in the past.  Weight gain and lack of exercise will tend to worsen these.  3.  Normotensive.    Plan:  1.  I recommended lifestyle changes to the patient occluding exercise and weight  reduction.  Also he should avoid stress, sleeplessness and caffeinated products.  2.  I will see the patient back again in 1 years time and repeat his lipid profile and I will also repeat an EKG.    As always the patient is been told to contact me if he is any questions or any concerns.    Ángel Amaro MD, FACC, FRCPI      Orders Placed This Encounter   Procedures    Basic metabolic panel    Follow-Up with Cardiology    EKG 12-lead complete w/read - Clinics (to be scheduled)       No orders of the defined types were placed in this encounter.      There are no discontinued medications.      Encounter Diagnoses   Name Primary?    PVC's (premature ventricular contractions)     Paroxysmal ventricular tachycardia (H) Yes    Heart palpitations        CURRENT MEDICATIONS:  Current Outpatient Medications   Medication Sig Dispense Refill    fluticasone (FLONASE) 50 MCG/ACT spray Spray 1-2 sprays into both nostrils daily (Patient taking differently: Spray 1-2 sprays into both nostrils as needed) 1 Bottle 0    metoprolol succinate ER (TOPROL XL) 25 MG 24 hr tablet Take 1 tablet (25 mg) by mouth daily At bedtime 90 tablet 0    sertraline (ZOLOFT) 25 MG tablet TAKE 1 AND 1/2 TABLETS BY MOUTH DAILY 135 tablet 3       ALLERGIES     Allergies   Allergen Reactions    Seasonal Allergies        PAST MEDICAL HISTORY:  Past Medical History:   Diagnosis Date    CARDIOVASCULAR SCREENING; LDL GOAL LESS THAN 160 07/18/2013    Family hx of colon cancer 07/18/2013    Family hx of melanoma 07/18/2013    Generalised anxiety disorder 07/18/2013    Heart palpitations     Mild major depression (H) 07/18/2013    Mild major depression (H24) 07/18/2013       PAST SURGICAL HISTORY:  Past Surgical History:   Procedure Laterality Date    APPENDECTOMY  2007    HERNIA REPAIR         FAMILY HISTORY:  Family History   Problem Relation Age of Onset    Cancer Mother         melanoma    Prostate Cancer Father        SOCIAL HISTORY:  Social History      Socioeconomic History    Marital status:      Spouse name: None    Number of children: None    Years of education: None    Highest education level: None   Tobacco Use    Smoking status: Never    Smokeless tobacco: Never   Vaping Use    Vaping Use: Never used   Substance and Sexual Activity    Alcohol use: Yes     Comment: About 2 bottles per week    Drug use: No    Sexual activity: Yes     Partners: Female   Other Topics Concern    Parent/sibling w/ CABG, MI or angioplasty before 65F 55M? No     Social Determinants of Health     Financial Resource Strain: Low Risk  (2/1/2023)    Overall Financial Resource Strain (CARDIA)     Difficulty of Paying Living Expenses: Not hard at all   Food Insecurity: No Food Insecurity (2/1/2023)    Hunger Vital Sign     Worried About Running Out of Food in the Last Year: Never true     Ran Out of Food in the Last Year: Never true   Transportation Needs: No Transportation Needs (2/1/2023)    PRAPARE - Transportation     Lack of Transportation (Medical): No     Lack of Transportation (Non-Medical): No   Physical Activity: Insufficiently Active (2/1/2023)    Exercise Vital Sign     Days of Exercise per Week: 2 days     Minutes of Exercise per Session: 10 min   Stress: No Stress Concern Present (2/1/2023)    [place] of Occupational Health - Occupational Stress Questionnaire     Feeling of Stress : Not at all   Social Connections: Moderately Isolated (2/1/2023)    Social Connection and Isolation Panel [NHANES]     Frequency of Communication with Friends and Family: Once a week     Frequency of Social Gatherings with Friends and Family: Once a week     Attends [employer] Services: 1 to 4 times per year     Active Member of Clubs or Organizations: No     Marital Status:    Housing Stability: Low Risk  (2/1/2023)    Housing Stability Vital Sign     Unable to Pay for Housing in the Last Year: No     Number of Places Lived in the Last Year: 1     Unstable Housing in  "the Last Year: No       Review of Systems:  Skin:          Eyes:         ENT:         Respiratory:  Negative       Cardiovascular:    Positive for;palpitations    Gastroenterology:        Genitourinary:         Musculoskeletal:         Neurologic:         Psychiatric:         Heme/Lymph/Imm:         Endocrine:           Physical Exam:  Vitals: /80 (BP Location: Right arm, Patient Position: Sitting, Cuff Size: Adult Large)   Pulse 54   Ht 1.88 m (6' 2\")   Wt 98.8 kg (217 lb 12.8 oz)   SpO2 96%   BMI 27.96 kg/m      Constitutional:  cooperative, alert and oriented, well developed, well nourished, in no acute distress overweight      Skin:  warm and dry to the touch, no apparent skin lesions or masses noted          Head:  normocephalic, no masses or lesions        Eyes:  pupils equal and round, conjunctivae and lids unremarkable, sclera white, no xanthalasma, EOMS intact, no nystagmus        Lymph:      ENT:  no pallor or cyanosis        Neck:  carotid pulses are full and equal bilaterally, JVP normal, no carotid bruit        Respiratory:  normal breath sounds, clear to auscultation, normal A-P diameter, normal symmetry, normal respiratory excursion, no use of accessory muscles         Cardiac: regular rhythm;normal S1 and S2;no S3 or S4;apical impulse not displaced;no murmurs, gallops or rubs detected occasional premature beats              pulses full and equal                                        GI:  not assessed this visit        Extremities and Muscular Skeletal:  no deformities, clubbing, cyanosis, erythema observed;no edema              Neurological:  no gross motor deficits;affect appropriate        Psych:  Alert and Oriented x 3        CC  No referring provider defined for this encounter.    Thank you for allowing me to participate in the care of your patient.      Sincerely,     Ángel Amaro MD, MD     [facility]  "

## 2024-02-13 ENCOUNTER — OFFICE VISIT (OUTPATIENT)
Dept: FAMILY MEDICINE | Facility: CLINIC | Age: 49
End: 2024-02-13
Attending: PHYSICIAN ASSISTANT
Payer: COMMERCIAL

## 2024-02-13 VITALS
HEART RATE: 72 BPM | WEIGHT: 217.6 LBS | DIASTOLIC BLOOD PRESSURE: 64 MMHG | BODY MASS INDEX: 27.93 KG/M2 | SYSTOLIC BLOOD PRESSURE: 102 MMHG | RESPIRATION RATE: 16 BRPM | TEMPERATURE: 97.4 F | HEIGHT: 74 IN | OXYGEN SATURATION: 94 %

## 2024-02-13 DIAGNOSIS — Z12.5 SCREENING FOR PROSTATE CANCER: ICD-10-CM

## 2024-02-13 DIAGNOSIS — N39.43 URINARY INCONTINENCE, POST-VOID DRIBBLING: ICD-10-CM

## 2024-02-13 DIAGNOSIS — F33.42 RECURRENT MAJOR DEPRESSIVE DISORDER, IN FULL REMISSION (H): ICD-10-CM

## 2024-02-13 DIAGNOSIS — Z00.00 ROUTINE GENERAL MEDICAL EXAMINATION AT A HEALTH CARE FACILITY: Primary | ICD-10-CM

## 2024-02-13 DIAGNOSIS — Z12.11 SCREEN FOR COLON CANCER: ICD-10-CM

## 2024-02-13 DIAGNOSIS — F41.1 GENERALIZED ANXIETY DISORDER: ICD-10-CM

## 2024-02-13 PROBLEM — R39.11 URINARY HESITANCY: Status: ACTIVE | Noted: 2024-02-13

## 2024-02-13 PROBLEM — Z80.42 FAMILY HISTORY OF PROSTATE CANCER: Status: ACTIVE | Noted: 2019-05-10

## 2024-02-13 PROCEDURE — 90471 IMMUNIZATION ADMIN: CPT | Performed by: PHYSICIAN ASSISTANT

## 2024-02-13 PROCEDURE — 96127 BRIEF EMOTIONAL/BEHAV ASSMT: CPT | Performed by: PHYSICIAN ASSISTANT

## 2024-02-13 PROCEDURE — 99396 PREV VISIT EST AGE 40-64: CPT | Mod: 25 | Performed by: PHYSICIAN ASSISTANT

## 2024-02-13 PROCEDURE — 90746 HEPB VACCINE 3 DOSE ADULT IM: CPT | Performed by: PHYSICIAN ASSISTANT

## 2024-02-13 RX ORDER — SERTRALINE HYDROCHLORIDE 25 MG/1
TABLET, FILM COATED ORAL
Qty: 135 TABLET | Refills: 0 | Status: SHIPPED | OUTPATIENT
Start: 2024-02-13 | End: 2024-02-13

## 2024-02-13 RX ORDER — SERTRALINE HYDROCHLORIDE 25 MG/1
TABLET, FILM COATED ORAL
Qty: 135 TABLET | Refills: 3 | Status: SHIPPED | OUTPATIENT
Start: 2024-02-13

## 2024-02-13 ASSESSMENT — PAIN SCALES - GENERAL: PAINLEVEL: NO PAIN (0)

## 2024-02-13 NOTE — PROGRESS NOTES
"Preventive Care Visit  [facility]  Eduardo Mckinnon PA-C, Family Medicine  Feb 13, 2024  Assessment & Plan     Routine general medical examination at a health care facility  Stable exam. Labs pending.     Screen for colon cancer  Due.   - COLOGUARD([employer]); Future    Screening for prostate cancer  Strong family history. Incontinence symptoms for last 2 years. Will obtain psa but given family history and symptoms and known history of psa being normal in decent amount of prostate cancers, will have consult with urology for a second opinion.   - PSA, screen; Future    Urinary incontinence, post-void dribbling  As above     Recurrent major depressive disorder, in full remission (H24)  Stable on current regimen. Will maintian. Recheck phq 9 in 6 months. If stable, annual visits.   - sertraline (ZOLOFT) 25 MG tablet; TAKE 1 AND 1/2 TABLETS BY MOUTH DAILY  Medication use and side effects discussed with the patient. Patient is in complete understanding and agreement with plan.     Generalized anxiety disorder  As above   - sertraline (ZOLOFT) 25 MG tablet; TAKE 1 AND 1/2 TABLETS BY MOUTH DAILY        BMI  Estimated body mass index is 27.94 kg/m  as calculated from the following:    Height as of this encounter: 1.88 m (6' 2\").    Weight as of this encounter: 98.7 kg (217 lb 9.6 oz).   Weight management plan: Discussed healthy diet and exercise guidelines    Counseling  Appropriate preventive services were discussed with this patient, including applicable screening as appropriate for fall prevention, nutrition, physical activity, Tobacco-use cessation, weight loss and cognition.  Checklist reviewing preventive services available has been given to the patient.  Reviewed patient's diet, addressing concerns and/or questions.   He is at risk for lack of exercise and has been provided with information to increase physical activity for the benefit of his well-being.   He is at risk for " psychosocial distress and has been provided with information to reduce risk.     Patient has been advised of split billing requirements and indicates understanding: Yes        Benito Lang is a 48 year old, presenting for the following:  Physical and Urinary Problem        2/13/2024     8:06 AM   Additional Questions   Roomed by Ness ELIZABETH CMA        Health Care Directive  Patient does not have a Health Care Directive or Living Will: Discussed advance care planning with patient; information given to patient to review.    HPI    Genitourinary - Male  Brother and father both have have prostate cancer and he would like to just make sure he is getting screened.   Brother was diagnosed with prostate cancer last year at 45 years old and father was in his 70's.     For last 2 years notes intermittent post voiding dribbling ~1 hour after urination. States drinks 1 coffee daily. This has made some of his anxiety worse. Otherwise, stable depression and anxiety on his current zoloft.      Depression and Anxiety Follow-Up  How are you doing with your depression since your last visit? No change  How are you doing with your anxiety since your last visit?  Worsened mild worsening given urination symptoms and prostate cancer family history.   Are you having other symptoms that might be associated with depression or anxiety? No  Have you had a significant life event? OTHER: family history of cancer    Do you have any concerns with your use of alcohol or other drugs? No    Social History     Tobacco Use    Smoking status: Never    Smokeless tobacco: Never   Vaping Use    Vaping Use: Never used   Substance Use Topics    Alcohol use: Yes     Comment: About 2 bottles per week    Drug use: No         11/23/2022     9:58 AM 2/6/2023     4:16 PM 2/13/2024     7:12 AM   PHQ   PHQ-9 Total Score 0 0 0   Q9: Thoughts of better off dead/self-harm past 2 weeks Not at all Not at all Not at all         6/7/2021    11:25 AM 12/1/2021     11:05 AM 2/6/2023     4:17 PM   JENIFER-7 SCORE   Total Score 1 (minimal anxiety)  0 (minimal anxiety)   Total Score 1 1 0         2/13/2024     7:12 AM   Last PHQ-9   1.  Little interest or pleasure in doing things 0   2.  Feeling down, depressed, or hopeless 0   3.  Trouble falling or staying asleep, or sleeping too much 0   4.  Feeling tired or having little energy 0   5.  Poor appetite or overeating 0   6.  Feeling bad about yourself 0   7.  Trouble concentrating 0   8.  Moving slowly or restless 0   Q9: Thoughts of better off dead/self-harm past 2 weeks 0   PHQ-9 Total Score 0         2/6/2023     4:17 PM   JENIFER-7    1. Feeling nervous, anxious, or on edge 0   2. Not being able to stop or control worrying 0   3. Worrying too much about different things 0   4. Trouble relaxing 0   5. Being so restless that it is hard to sit still 0   6. Becoming easily annoyed or irritable 0   7. Feeling afraid, as if something awful might happen 0   JENIFER-7 Total Score 0   If you checked any problems, how difficult have they made it for you to do your work, take care of things at home, or get along with other people? Not difficult at all       Suicide Assessment Five-step Evaluation and Treatment (SAFE-T)        2/6/2024   General Health   How would you rate your overall physical health? Good   Feel stress (tense, anxious, or unable to sleep) Only a little    Only a little   (!) STRESS CONCERN      2/6/2024   Nutrition   Three or more servings of calcium each day? Yes   Diet: Other   If other, please elaborate: Lactose intolerant   How many servings of fruit and vegetables per day? (!) 2-3   How many sweetened beverages each day? 0-1         2/6/2024   Exercise   Days per week of moderate/strenous exercise 1 day    1 day   Average minutes spent exercising at this level 20 min    20 min   (!) EXERCISE CONCERN      2/6/2024   Social Factors   Frequency of gathering with friends or relatives Once a week   Worry food won't last until get  "money to buy more No    No   Food not last or not have enough money for food? No    No   Do you have housing?  Yes    Yes   Are you worried about losing your housing? No    No   Lack of transportation? No    No   Unable to get utilities (heat,electricity)? No    No         2/6/2024   Dental   Dentist two times every year? Yes         2/6/2024   TB Screening   Were you born outside of [place]?  No     Today's PHQ-9 Score:       2/13/2024     7:12 AM   PHQ-9 SCORE   PHQ-9 Total Score MyChart 0   PHQ-9 Total Score 0         2/6/2024   Substance Use   Frequency of drinking alcohol? 2-4 times a month   Alcohol more than 3/day or more than 7/wk No   Do you use any other substances recreationally? No     Social History     Tobacco Use    Smoking status: Never    Smokeless tobacco: Never   Vaping Use    Vaping Use: Never used   Substance Use Topics    Alcohol use: Yes     Comment: About 2 bottles per week    Drug use: No         2/6/2024   STI Screening   New sexual partner(s) since last STI/HIV test? No   The 10-year ASCVD risk score (Dea ANN, et al., 2019) is: 1.8%    Values used to calculate the score:      Age: 48 years      Sex: Male      Is Non- : No      Diabetic: No      Tobacco smoker: No      Systolic Blood Pressure: 102 mmHg      Is BP treated: No      HDL Cholesterol: 37 mg/dL      Total Cholesterol: 164 mg/dL        2/6/2024   Contraception/Family Planning   Questions about contraception or family planning No        Reviewed and updated as needed this visit by Provider   Tobacco  Allergies  Meds  Problems  Med Hx  Surg Hx  Fam Hx             Objective    Exam  /64 (BP Location: Right arm, Patient Position: Sitting, Cuff Size: Adult Regular)   Pulse 72   Temp 97.4  F (36.3  C) (Temporal)   Resp 16   Ht 1.88 m (6' 2\")   Wt 98.7 kg (217 lb 9.6 oz)   SpO2 94%   BMI 27.94 kg/m     Estimated body mass index is 27.94 kg/m  as calculated from the following:    Height as of this " "encounter: 1.88 m (6' 2\").    Weight as of this encounter: 98.7 kg (217 lb 9.6 oz).    Physical Exam  GENERAL: alert and no distress  EYES: Eyes grossly normal to inspection, PERRL and conjunctivae and sclerae normal  HENT: ear canals and TM's normal, nose and mouth without ulcers or lesions  NECK: no adenopathy, no asymmetry, masses, or scars  RESP: lungs clear to auscultation - no rales, rhonchi or wheezes  CV: regular rate and rhythm, normal S1 S2, no S3 or S4, no murmur, click or rub, no peripheral edema  ABDOMEN: soft, nontender, no hepatosplenomegaly, no masses and bowel sounds normal  MS: no gross musculoskeletal defects noted, no edema  SKIN: no suspicious lesions or rashes  NEURO: Normal strength and tone, mentation intact and speech normal  PSYCH: mentation appears normal, affect normal/bright    Signed Electronically by: Eduardo Mckinnon PA-C    "

## 2024-02-13 NOTE — PATIENT INSTRUCTIONS
"Learning About Being Physically Active  What is physical activity?     Being physically active means doing any kind of activity that gets your body moving.  The types of physical activity that can help you get fit and stay healthy include:  Aerobic or \"cardio\" activities. These make your heart beat faster and make you breathe harder, such as brisk walking, riding a bike, or running. They strengthen your heart and lungs and build up your endurance.  Strength training activities. These make your muscles work against, or \"resist,\" something. Examples include lifting weights or doing push-ups. These activities help tone and strengthen your muscles and bones.  Stretches. These let you move your joints and muscles through their full range of motion. Stretching helps you be more flexible.  Reaching a balance between these three types of physical activity is important because each one contributes to your overall fitness.  What are the benefits of being active?  Being active is one of the best things you can do for your health. It helps you to:  Feel stronger and have more energy to do all the things you like to do.  Focus better at school or work.  Feel, think, and sleep better.  Reach and stay at a healthy weight.  Lose fat and build lean muscle.  Lower your risk for serious health problems, including diabetes, heart attack, high blood pressure, and some cancers.  Keep your heart, lungs, bones, muscles, and joints strong and healthy.  How can you make being active part of your life?  Start slowly. Make it your long-term goal to get at least 30 minutes of exercise on most days of the week. Walking is a good choice. You also may want to do other activities, such as running, swimming, cycling, or playing tennis or team sports.  Pick activities that you like--ones that make your heart beat faster, your muscles stronger, and your muscles and joints more flexible. If you find more than one thing you like doing, do them all. You " "don't have to do the same thing every day.  Get your heart pumping every day. Any activity that makes your heart beat faster and keeps it at that rate for a while counts.  Here are some great ways to get your heart beating faster:  Go for a brisk walk, run, or hike.  Go for a swim or bike ride.  Take an online exercise class or dance.  Play a game of touch football, basketball, or soccer.  Play tennis, pickleball, or racquetball.  Climb stairs.  Even some household chores can be aerobic. Just do them at a faster pace. Raking or mowing the lawn, sweeping the garage, and vacuuming and cleaning your home all can help get your heart rate up.  Strengthen your muscles during the week. You don't have to lift heavy weights or grow big, bulky muscles to get stronger. Doing a few simple activities that make your muscles work against, or \"resist,\" something can help you get stronger. Aim for at least twice a week.  For example, you can:  Do push-ups or sit-ups, which use your own body weight as resistance.  Lift weights or dumbbells or use stretch bands at home or in a gym or community center.  Stretch your muscles often. Stretching will help you as you become more active. It can help you stay flexible and loosen tight muscles. It can also help improve your balance and posture and can be a great way to relax.  Be sure to stretch the muscles you'll be using when you work out. It's best to warm your muscles slightly before you stretch them. Walk or do some other light aerobic activity for a few minutes. Then start stretching.  When you stretch your muscles:  Do it slowly. Stretching is not about going fast or making sudden movements.  Don't push or bounce during a stretch.  Hold each stretch for at least 15 to 30 seconds, if you can. You should feel a stretch in the muscle, but not pain.  Breathe out as you do the stretch. Then breathe in as you hold the stretch. Don't hold your breath.  If you're worried about how more activity " "might affect your health, have a checkup before you start. Follow any special advice your doctor gives you for getting a smart start.  Where can you learn more?  Go to https://www.[employer].net/patiented  Enter W332 in the search box to learn more about \"Learning About Being Physically Active.\"  Current as of: June 6, 2023               Content Version: 13.8    6749-7439 [employer]   Care instructions adapted under license by your healthcare professional. If you have questions about a medical condition or this instruction, always ask your healthcare professional. [employer] disclaims any warranty or liability for your use of this information.      Eating Healthy Foods: Care Instructions  With every meal, you can make healthy food choices. Try to eat a variety of fruits, vegetables, whole grains, lean proteins, and low-fat dairy products. This can help you get the right balance of nutrients, including vitamins and minerals. Small changes add up over time. You can start by adding one healthy food to your meals each day.    Try to make half your plate fruits and vegetables, one-fourth whole grains, and one-fourth lean proteins. Try including dairy with your meals.   Eat more fruits and vegetables. Try to have them with most meals and snacks.   Foods for healthy eating    Fruits    These can be fresh, frozen, canned, or dried.  Try to choose whole fruit rather than fruit juice.  Eat a variety of colors.    Vegetables    These can be fresh, frozen, canned, or dried.  Beans, peas, and lentils count too.    Whole grains    Choose whole-grain breads, cereals, and noodles.  Try brown rice.    Lean proteins    These can include lean meat, poultry, fish, and eggs.  You can also have tofu, beans, peas, lentils, nuts, and seeds.    Dairy    Try milk, yogurt, and cheese.  Choose low-fat or fat-free when you can.  If you need to, use lactose-free milk or fortified plant-based milk products, such as " "soy milk.    Water    Drink water when you're thirsty.  Limit sugar-sweetened drinks, including soda, fruit drinks, and sports drinks.  Where can you learn more?  Go to https://www.[employer].net/patiented  Enter T756 in the search box to learn more about \"Eating Healthy Foods: Care Instructions.\"  Current as of: February 28, 2023               Content Version: 13.8    5236-7237 [employer]   Care instructions adapted under license by your healthcare professional. If you have questions about a medical condition or this instruction, always ask your healthcare professional. [employer] disclaims any warranty or liability for your use of this information.      Learning About Stress  What is stress?     Stress is your body's response to a hard situation. Your body can have a physical, emotional, or mental response. Stress is a fact of life for most people, and it affects everyone differently. What causes stress for you may not be stressful for someone else.  A lot of things can cause stress. You may feel stress when you go on a job interview, take a test, or run a race. This kind of short-term stress is normal and even useful. It can help you if you need to work hard or react quickly. For example, stress can help you finish an important job on time.  Long-term stress is caused by ongoing stressful situations or events. Examples of long-term stress include long-term health problems, ongoing problems at work, or conflicts in your family. Long-term stress can harm your health.  How does stress affect your health?  When you are stressed, your body responds as though you are in danger. It makes hormones that speed up your heart, make you breathe faster, and give you a burst of energy. This is called the fight-or-flight stress response. If the stress is over quickly, your body goes back to normal and no harm is done.  But if stress happens too often or lasts too long, it can have bad effects. " Long-term stress can make you more likely to get sick, and it can make symptoms of some diseases worse. If you tense up when you are stressed, you may develop neck, shoulder, or low back pain. Stress is linked to high blood pressure and heart disease.  Stress also harms your emotional health. It can make you flores, tense, or depressed. Your relationships may suffer, and you may not do well at work or school.  What can you do to manage stress?  You can try these things to help manage stress:   Do something active. Exercise or activity can help reduce stress. Walking is a great way to get started. Even everyday activities such as housecleaning or yard work can help.  Try yoga or alida chi. These techniques combine exercise and meditation. You may need some training at first to learn them.  Do something you enjoy. For example, listen to music or go to a movie. Practice your hobby or do volunteer work.  Meditate. This can help you relax, because you are not worrying about what happened before or what may happen in the future.  Do guided imagery. Imagine yourself in any setting that helps you feel calm. You can use online videos, books, or a teacher to guide you.  Do breathing exercises. For example:  From a standing position, bend forward from the waist with your knees slightly bent. Let your arms dangle close to the floor.  Breathe in slowly and deeply as you return to a standing position. Roll up slowly and lift your head last.  Hold your breath for just a few seconds in the standing position.  Breathe out slowly and bend forward from the waist.  Let your feelings out. Talk, laugh, cry, and express anger when you need to. Talking with supportive friends or family, a counselor, or a merna leader about your feelings is a healthy way to relieve stress. Avoid discussing your feelings with people who make you feel worse.  Write. It may help to write about things that are bothering you. This helps you find out how much stress  "you feel and what is causing it. When you know this, you can find better ways to cope.  What can you do to prevent stress?  You might try some of these things to help prevent stress:  Manage your time. This helps you find time to do the things you want and need to do.  Get enough sleep. Your body recovers from the stresses of the day while you are sleeping.  Get support. Your family, friends, and community can make a difference in how you experience stress.  Limit your news feed. Avoid or limit time on social media or news that may make you feel stressed.  Do something active. Exercise or activity can help reduce stress. Walking is a great way to get started.  Where can you learn more?  Go to https://www.[employer].net/patiented  Enter N032 in the search box to learn more about \"Learning About Stress.\"  Current as of: February 26, 2023               Content Version: 13.8    6095-2054 [employer].   Care instructions adapted under license by your healthcare professional. If you have questions about a medical condition or this instruction, always ask your healthcare professional. [employer] disclaims any warranty or liability for your use of this information.      Preventive Care Advice   This is general advice given by our system to help you stay healthy. However, your care team may have specific advice just for you. Please talk to your care team about your preventive care needs.  Nutrition  Eat 5 or more servings of fruits and vegetables each day.  Try wheat bread, brown rice and whole grain pasta (instead of white bread, rice, and pasta).  Get enough calcium and vitamin D. Check the label on foods and aim for 100% of the RDA (recommended daily allowance).  Lifestyle  Exercise at least 150 minutes each week  (30 minutes a day, 5 days a week).  Do muscle strengthening activities 2 days a week. These help control your weight and prevent disease.  No smoking.  Wear sunscreen to prevent skin " cancer.  Have a dental exam and cleaning every 6 months.  Yearly exams  See your health care team every year to talk about:  Any changes in your health.  Any medicines your care team has prescribed.  Preventive care, family planning, and ways to prevent chronic diseases.  Shots (vaccines)   HPV shots (up to age 26), if you've never had them before.  Hepatitis B shots (up to age 59), if you've never had them before.  COVID-19 shot: Get this shot when it's due.  Flu shot: Get a flu shot every year.  Tetanus shot: Get a tetanus shot every 10 years.  Pneumococcal, hepatitis A, and RSV shots: Ask your care team if you need these based on your risk.  Shingles shot (for age 50 and up)  General health tests  Diabetes screening:  Starting at age 35, Get screened for diabetes at least every 3 years.  If you are younger than age 35, ask your care team if you should be screened for diabetes.  Cholesterol test: At age 39, start having a cholesterol test every 5 years, or more often if advised.  Bone density scan (DEXA): At age 50, ask your care team if you should have this scan for osteoporosis (brittle bones).  Hepatitis C: Get tested at least once in your life.  STIs (sexually transmitted infections)  Before age 24: Ask your care team if you should be screened for STIs.  After age 24: Get screened for STIs if you're at risk. You are at risk for STIs (including HIV) if:  You are sexually active with more than one person.  You don't use condoms every time.  You or a partner was diagnosed with a sexually transmitted infection.  If you are at risk for HIV, ask about PrEP medicine to prevent HIV.  Get tested for HIV at least once in your life, whether you are at risk for HIV or not.  Cancer screening tests  Cervical cancer screening: If you have a cervix, begin getting regular cervical cancer screening tests starting at age 21.  Breast cancer scan (mammogram): If you've ever had breasts, begin having regular mammograms starting at  age 40. This is a scan to check for breast cancer.  Colon cancer screening: It is important to start screening for colon cancer at age 45.  Have a colonoscopy test every 10 years (or more often if you're at risk) Or, ask your provider about stool tests like a FIT test every year or Cologuard test every 3 years.  To learn more about your testing options, visit:   https://[email or web address]/027877.pdf.  For help making a decision, visit:   https://HidInImage.tuta.co/vt02830.  Prostate cancer screening test: If you have a prostate, ask your care team if a prostate cancer screening test (PSA) at age 55 is right for you.  Lung cancer screening: If you are a current or former smoker ages 50 to 80, ask your care team if ongoing lung cancer screenings are right for you.  For informational purposes only. Not to replace the advice of your health care provider. Copyright   2023 [facility]. All rights reserved. Clinically reviewed by the [facility] Transitions Program. EQAL 428697 - REV 01/24.

## 2024-02-19 ENCOUNTER — LAB (OUTPATIENT)
Dept: LAB | Facility: CLINIC | Age: 49
End: 2024-02-19
Payer: COMMERCIAL

## 2024-02-19 DIAGNOSIS — Z12.5 SCREENING FOR PROSTATE CANCER: ICD-10-CM

## 2024-02-19 LAB — PSA SERPL DL<=0.01 NG/ML-MCNC: 0.89 NG/ML (ref 0–2.5)

## 2024-02-19 PROCEDURE — G0103 PSA SCREENING: HCPCS

## 2024-02-19 PROCEDURE — 36415 COLL VENOUS BLD VENIPUNCTURE: CPT

## 2024-02-21 ENCOUNTER — MYC MEDICAL ADVICE (OUTPATIENT)
Dept: FAMILY MEDICINE | Facility: CLINIC | Age: 49
End: 2024-02-21
Payer: COMMERCIAL

## 2024-02-27 ENCOUNTER — ORDERS ONLY (AUTO-RELEASED) (OUTPATIENT)
Dept: FAMILY MEDICINE | Facility: CLINIC | Age: 49
End: 2024-02-27
Payer: COMMERCIAL

## 2024-02-27 DIAGNOSIS — Z12.11 SCREEN FOR COLON CANCER: ICD-10-CM

## 2024-02-28 ENCOUNTER — VIRTUAL VISIT (OUTPATIENT)
Dept: UROLOGY | Facility: CLINIC | Age: 49
End: 2024-02-28
Payer: COMMERCIAL

## 2024-02-28 DIAGNOSIS — N40.1 BPH WITH OBSTRUCTION/LOWER URINARY TRACT SYMPTOMS: Primary | ICD-10-CM

## 2024-02-28 DIAGNOSIS — Z12.5 SCREENING FOR PROSTATE CANCER: ICD-10-CM

## 2024-02-28 DIAGNOSIS — N39.43 URINARY INCONTINENCE, POST-VOID DRIBBLING: ICD-10-CM

## 2024-02-28 DIAGNOSIS — Z80.42 FAMILY HISTORY OF PROSTATE CANCER: ICD-10-CM

## 2024-02-28 DIAGNOSIS — N13.8 BPH WITH OBSTRUCTION/LOWER URINARY TRACT SYMPTOMS: Primary | ICD-10-CM

## 2024-02-28 PROCEDURE — 99204 OFFICE O/P NEW MOD 45 MIN: CPT | Mod: 95 | Performed by: PHYSICIAN ASSISTANT

## 2024-02-28 RX ORDER — TAMSULOSIN HYDROCHLORIDE 0.4 MG/1
0.4 CAPSULE ORAL DAILY
Qty: 90 CAPSULE | Refills: 3 | Status: SHIPPED | OUTPATIENT
Start: 2024-02-28

## 2024-02-28 NOTE — PATIENT INSTRUCTIONS
Urine testing and bladder scan    Update me with brothers pathology results from biopsy.     You have been prescribed Flomax (tamsulosin 0.4mg daily). This is best taken 30 min after your evening meal.     Flomax is prescribed for men with difficulty urinating due to benign prostatic enlargement.  Hopefully, you will see improvement in your urinary symptoms in the next 7-10 days.    Common side effects include:  Weakness or fatigue  Sinus congestion or runny nose  Dizziness or lightheadedness  Decrease or lack of ejaculation

## 2024-02-28 NOTE — PROGRESS NOTES
"Virtual Visit Details    Type of service:  Video Visit     Originating Location (pt. Location): Home    Distant Location (provider location):  On-site  Platform used for Video Visit: Mere  Start time: 1230pm  End time 1245pm    CC: LUTS, FHx prostate cancer    HPI:  Juna Butt is a pleasant 48 year old male who presents in consultation from César Mckinnon PA-C for evaluation of the above.     Brother dx with prostate cancer at 45 yrs. Bx noted \"something to monitor\". Recalls his PSA to be slightly elevated at that time.    Dad dx with prostate cancer at ~70 yrs. Had prostatectomy and doing well.     Has incontinence that is unpredictable (post void within the hour). Stream is usually strong, sometimes can dribble/be slow. Post void dribbling. Ongoing for ~1 year.     Has not had UA yet.     Past Medical History:   Diagnosis Date    CARDIOVASCULAR SCREENING; LDL GOAL LESS THAN 160 07/18/2013    Family hx of colon cancer 07/18/2013    Family hx of melanoma 07/18/2013    Generalised anxiety disorder 07/18/2013    Heart palpitations     Mild major depression (H) 07/18/2013    Mild major depression (H24) 07/18/2013       Past Surgical History:   Procedure Laterality Date    APPENDECTOMY  2007    HERNIA REPAIR         Social History     Socioeconomic History    Marital status:      Spouse name: Not on file    Number of children: Not on file    Years of education: Not on file    Highest education level: Not on file   Occupational History    Not on file   Tobacco Use    Smoking status: Never    Smokeless tobacco: Never   Vaping Use    Vaping Use: Never used   Substance and Sexual Activity    Alcohol use: Yes     Comment: About 2 bottles per week    Drug use: No    Sexual activity: Yes     Partners: Female   Other Topics Concern    Parent/sibling w/ CABG, MI or angioplasty before 65F 55M? No   Social History Narrative    Not on file     Social Determinants of Health     Financial Resource Strain: Low Risk  (2/6/2024) "    Financial Resource Strain     Within the past 12 months, have you or your family members you live with been unable to get utilities (heat, electricity) when it was really needed?: No   Food Insecurity: Low Risk  (2/6/2024)    Food Insecurity     Within the past 12 months, did you worry that your food would run out before you got money to buy more?: No     Within the past 12 months, did the food you bought just not last and you didn t have money to get more?: No   Transportation Needs: Low Risk  (2/6/2024)    Transportation Needs     Within the past 12 months, has lack of transportation kept you from medical appointments, getting your medicines, non-medical meetings or appointments, work, or from getting things that you need?: No   Physical Activity: Insufficiently Active (2/6/2024)    Exercise Vital Sign     Days of Exercise per Week: 1 day     Minutes of Exercise per Session: 20 min   Stress: No Stress Concern Present (2/6/2024)    [place] [employer] - Occupational Stress Questionnaire     Feeling of Stress : Only a little   Social Connections: Moderately Integrated (2/6/2024)    Social Connection and Isolation Panel [NHANES]     Frequency of Communication with Friends and Family: Twice a week     Frequency of Social Gatherings with Friends and Family: Once a week     Attends Hoahaoism Services: 1 to 4 times per year     Active Member of Clubs or Organizations: No     Attends Club or Organization Meetings: Not on file     Marital Status:    Interpersonal Safety: Low Risk  (2/13/2024)    Interpersonal Safety     Do you feel physically and emotionally safe where you currently live?: Yes     Within the past 12 months, have you been hit, slapped, kicked or otherwise physically hurt by someone?: No     Within the past 12 months, have you been humiliated or emotionally abused in other ways by your partner or ex-partner?: No   Housing Stability: Low Risk  (2/6/2024)    Housing Stability      Do you have housing? : Yes     Are you worried about losing your housing?: No       Family History   Problem Relation Age of Onset    Cancer Mother         melanoma    Prostate Cancer Father     Prostate Cancer Brother 45       Allergies   Allergen Reactions    Seasonal Allergies        Current Outpatient Medications   Medication    fluticasone (FLONASE) 50 MCG/ACT spray    metoprolol succinate ER (TOPROL XL) 25 MG 24 hr tablet    sertraline (ZOLOFT) 25 MG tablet     No current facility-administered medications for this visit.         PEx:   There were no vitals taken for this visit.    PSYCH: NAD  EYES: EOMI  MOUTH: MMM  NEURO: AAO x3    Urine:      A/P: Rickey Butt is a 48 year old male with Fhx prostate cancer, BPH w/ LUTS  UA, PVR with nurse in [facility]  Trial of Flomax 0.4mg, SE reviewed  Obtain brother's path results.   2-3 mo VV, med check      Neisha Hansen PA-C  [facility]        25 minutes spent on the date of the encounter doing chart review, review of outside records, review of test results, interpretation of tests, patient visit and documentation

## 2024-02-28 NOTE — NURSING NOTE
Is the patient currently in the state of [place]? YES    Visit mode:VIDEO    If the visit is dropped, the patient can be reconnected by: VIDEO VISIT: Send to e-mail at: [email or web address]    Will anyone else be joining the visit? NO  (If patient encounters technical issues they should call [phone])    How would you like to obtain your AVS? MyChart    Are changes needed to the allergy or medication list? No    Reason for visit: Consult    Lucy LAZCANO

## 2024-02-28 NOTE — LETTER
"2/28/2024       RE: Rickey Butt  [address]  [place] [address]     Dear Colleague,    Thank you for referring your patient, Rickey Butt, to the [facility] at [facility]. Please see a copy of my visit note below.    Virtual Visit Details    Type of service:  Video Visit     Originating Location (pt. Location): Home    Distant Location (provider location):  On-site  Platform used for Video Visit: Immusoft  Start time: 1230pm  End time 1245pm    CC: LUTS, FHx prostate cancer    HPI:  Juan Butt is a pleasant 48 year old male who presents in consultation from César Mckinnon PA-C for evaluation of the above.     Brother dx with prostate cancer at 45 yrs. Bx noted \"something to monitor\". Recalls his PSA to be slightly elevated at that time.    Dad dx with prostate cancer at ~70 yrs. Had prostatectomy and doing well.     Has incontinence that is unpredictable (post void within the hour). Stream is usually strong, sometimes can dribble/be slow. Post void dribbling. Ongoing for ~1 year.     Has not had UA yet.     Past Medical History:   Diagnosis Date    CARDIOVASCULAR SCREENING; LDL GOAL LESS THAN 160 07/18/2013    Family hx of colon cancer 07/18/2013    Family hx of melanoma 07/18/2013    Generalised anxiety disorder 07/18/2013    Heart palpitations     Mild major depression (H) 07/18/2013    Mild major depression (H24) 07/18/2013       Past Surgical History:   Procedure Laterality Date    APPENDECTOMY  2007    HERNIA REPAIR         Social History     Socioeconomic History    Marital status:      Spouse name: Not on file    Number of children: Not on file    Years of education: Not on file    Highest education level: Not on file   Occupational History    Not on file   Tobacco Use    Smoking status: Never    Smokeless tobacco: Never   Vaping Use    Vaping Use: Never used   Substance and Sexual Activity    Alcohol use: Yes "     Comment: About 2 bottles per week    Drug use: No    Sexual activity: Yes     Partners: Female   Other Topics Concern    Parent/sibling w/ CABG, MI or angioplasty before 65F 55M? No   Social History Narrative    Not on file     Social Determinants of Health     Financial Resource Strain: Low Risk  (2/6/2024)    Financial Resource Strain     Within the past 12 months, have you or your family members you live with been unable to get utilities (heat, electricity) when it was really needed?: No   Food Insecurity: Low Risk  (2/6/2024)    Food Insecurity     Within the past 12 months, did you worry that your food would run out before you got money to buy more?: No     Within the past 12 months, did the food you bought just not last and you didn t have money to get more?: No   Transportation Needs: Low Risk  (2/6/2024)    Transportation Needs     Within the past 12 months, has lack of transportation kept you from medical appointments, getting your medicines, non-medical meetings or appointments, work, or from getting things that you need?: No   Physical Activity: Insufficiently Active (2/6/2024)    Exercise Vital Sign     Days of Exercise per Week: 1 day     Minutes of Exercise per Session: 20 min   Stress: No Stress Concern Present (2/6/2024)    [place] of [employer]ational Health - Occupational Stress Questionnaire     Feeling of Stress : Only a little   Social Connections: Moderately Integrated (2/6/2024)    Social Connection and Isolation Panel [NHANES]     Frequency of Communication with Friends and Family: Twice a week     Frequency of Social Gatherings with Friends and Family: Once a week     Attends Uatsdin Services: 1 to 4 times per year     Active Member of Clubs or Organizations: No     Attends Club or Organization Meetings: Not on file     Marital Status:    Interpersonal Safety: Low Risk  (2/13/2024)    Interpersonal Safety     Do you feel physically and emotionally safe where you currently  live?: Yes     Within the past 12 months, have you been hit, slapped, kicked or otherwise physically hurt by someone?: No     Within the past 12 months, have you been humiliated or emotionally abused in other ways by your partner or ex-partner?: No   Housing Stability: Low Risk  (2/6/2024)    Housing Stability     Do you have housing? : Yes     Are you worried about losing your housing?: No       Family History   Problem Relation Age of Onset    Cancer Mother         melanoma    Prostate Cancer Father     Prostate Cancer Brother 45       Allergies   Allergen Reactions    Seasonal Allergies        Current Outpatient Medications   Medication    fluticasone (FLONASE) 50 MCG/ACT spray    metoprolol succinate ER (TOPROL XL) 25 MG 24 hr tablet    sertraline (ZOLOFT) 25 MG tablet     No current facility-administered medications for this visit.         PEx:   There were no vitals taken for this visit.    PSYCH: NAD  EYES: EOMI  MOUTH: MMM  NEURO: AAO x3    Urine:      A/P: Rickey Butt is a 48 year old male with Fhx prostate cancer, BPH w/ LUTS  UA, PVR with nurse in [facility]  Trial of Flomax 0.4mg, SE reviewed  Obtain brother's path results.   2-3 mo VV, med check      Neisha Hansen PA-C  [facility]        25 minutes spent on the date of the encounter doing chart review, review of outside records, review of test results, interpretation of tests, patient visit and documentation

## 2024-03-01 ENCOUNTER — TELEPHONE (OUTPATIENT)
Dept: UROLOGY | Facility: CLINIC | Age: 49
End: 2024-03-01
Payer: COMMERCIAL

## 2024-03-01 NOTE — TELEPHONE ENCOUNTER
----- Message from Maritza Saeed sent at 2/29/2024  9:26 AM CST -----  Regarding: Nurse visit and 2-3 month follow up  UA, PVR with nurse in [facility]  2-3 mo VV, med check    [facility]  2/28/24

## 2024-03-07 ENCOUNTER — ALLIED HEALTH/NURSE VISIT (OUTPATIENT)
Dept: UROLOGY | Facility: CLINIC | Age: 49
End: 2024-03-07
Payer: COMMERCIAL

## 2024-03-07 DIAGNOSIS — N13.8 BPH WITH OBSTRUCTION/LOWER URINARY TRACT SYMPTOMS: Primary | ICD-10-CM

## 2024-03-07 DIAGNOSIS — N40.1 BPH WITH OBSTRUCTION/LOWER URINARY TRACT SYMPTOMS: Primary | ICD-10-CM

## 2024-03-07 LAB
ALBUMIN UR-MCNC: NEGATIVE MG/DL
APPEARANCE UR: CLEAR
BILIRUB UR QL STRIP: NEGATIVE
COLOR UR AUTO: ABNORMAL
GLUCOSE UR STRIP-MCNC: NEGATIVE MG/DL
HGB UR QL STRIP: NEGATIVE
KETONES UR STRIP-MCNC: NEGATIVE MG/DL
LEUKOCYTE ESTERASE UR QL STRIP: NEGATIVE
NITRATE UR QL: NEGATIVE
NONINV COLON CA DNA+OCC BLD SCRN STL QL: NEGATIVE
PH UR STRIP: 6 [PH] (ref 5–7)
RESIDUAL VOLUME (RV) (EXTERNAL): 20
SP GR UR STRIP: 1.02 (ref 1–1.03)
UROBILINOGEN UR STRIP-ACNC: 1 E.U./DL

## 2024-03-07 PROCEDURE — 51798 US URINE CAPACITY MEASURE: CPT

## 2024-03-07 PROCEDURE — 81003 URINALYSIS AUTO W/O SCOPE: CPT | Mod: QW

## 2024-03-07 NOTE — PROGRESS NOTES
Rickey Butt comes into clinic today at the request of JERILYN Mcginins, Ordering Provider for UA/PVR.    PVR: 20 mL by bladder scan    This service provided today was under the supervising provider of the day Dr. Alonzo, who was available if needed.    Karoline Stearns, EMT

## 2024-05-06 DIAGNOSIS — F41.1 GENERALIZED ANXIETY DISORDER: ICD-10-CM

## 2024-05-06 DIAGNOSIS — F33.42 RECURRENT MAJOR DEPRESSIVE DISORDER, IN FULL REMISSION (H): ICD-10-CM

## 2024-05-06 RX ORDER — SERTRALINE HYDROCHLORIDE 25 MG/1
TABLET, FILM COATED ORAL
Qty: 135 TABLET | Refills: 3 | OUTPATIENT
Start: 2024-05-06

## 2024-05-15 ENCOUNTER — VIRTUAL VISIT (OUTPATIENT)
Dept: UROLOGY | Facility: CLINIC | Age: 49
End: 2024-05-15
Payer: COMMERCIAL

## 2024-05-15 DIAGNOSIS — N13.8 BPH WITH OBSTRUCTION/LOWER URINARY TRACT SYMPTOMS: Primary | ICD-10-CM

## 2024-05-15 DIAGNOSIS — Z80.42 FAMILY HISTORY OF PROSTATE CANCER: ICD-10-CM

## 2024-05-15 DIAGNOSIS — N40.1 BPH WITH OBSTRUCTION/LOWER URINARY TRACT SYMPTOMS: Primary | ICD-10-CM

## 2024-05-15 DIAGNOSIS — N39.43 URINARY INCONTINENCE, POST-VOID DRIBBLING: ICD-10-CM

## 2024-05-15 PROCEDURE — 99214 OFFICE O/P EST MOD 30 MIN: CPT | Mod: 95 | Performed by: PHYSICIAN ASSISTANT

## 2024-05-15 ASSESSMENT — PAIN SCALES - GENERAL: PAINLEVEL: NO PAIN (0)

## 2024-05-15 NOTE — PATIENT INSTRUCTIONS
CYSTOSCOPY    What is a Cystoscopy?  This is a procedure done to check for problems inside the bladder/prostate.  Problems may include polyps (growths), tumors, inflammation (swelling and redness), obstruction and other concerns.    The Urologist inserts a thin tube (called a cystoscope) into the bladder.  The tube is about the size of a pencil.  We will give you numbing medicine to reduce the pain or discomfort you may feel.    The Urologist will be able to see inside the bladder by filling the bladder with water.  The water makes it easier to see any problems that may be present. You will have a sense to need to urinate and this is normal.       How should I get ready for the exam?  Nothing to do to prepare. You may eat normally the day of the exam. There is no sedation, so you may drive yourself to and from if you can drive.       Please tell your doctor if:  You have a history of urinary tract infections.  You know that you have a tumor in your bladder.  You have bleeding problems.  You have any allergies.  You are or may be pregnant.      What happens after the exam?  You may go back to your normal diet and activity as you feel ready.    For the next two days after the exam, you may notice:  Some blood in your urine.  Some burning when you urinate (use the toilet).  An urge to urinate more often.  Bladder spasms.    These are normal after the procedure. They should go away on their own after a day or two.      You can help to relieve the above listed symptoms by:  Drinking 6 to 8 large glasses of water each day (includes drinks at meals).  This will help clear the urine.  Take warm baths to relieve pain and bladder spasms.  Do not add anything to the bath water.  You may take Tylenol (acetaminophen) per label instructions for discomfort.

## 2024-05-15 NOTE — LETTER
"5/15/2024       RE: Rickey Butt  [address]  [place] [address]     Dear Colleague,    Thank you for referring your patient, Rickey Butt, to the [facility] at [facility]. Please see a copy of my visit note below.        Virtual Visit Details    Type of service:  Video Visit     Originating Location (pt. Location): Home    Distant Location (provider location):  On-site  Platform used for Video Visit: [place]cross  Start time: 1050am  End time 1100am    CC: LUTS, FHx prostate cancer    HPI:  Juan Butt is a pleasant 48 year old male who presents in follow-up of the above.     2/28/24: Brother dx with prostate cancer at 45 yrs. Bx noted \"something to monitor\". Recalls his PSA to be slightly elevated at that time.    Dad dx with prostate cancer at ~70 yrs. Had prostatectomy and doing well.     Has incontinence that is unpredictable (post void within the hour). Stream is usually strong, sometimes can dribble/be slow. Post void dribbling. Ongoing for ~1 year.     Has not had UA yet.     TODAY 5/15/24:  3 mo follow-up of Flomax, PVR 20cc, UA neg in interim  Brother with \"Adenocarcinoma of prostate, Dianna score 6    First wk good, then a couple wks of worsening symptoms. No profound improvement in LUTS, maybe worse at times.     Past Medical History:   Diagnosis Date    CARDIOVASCULAR SCREENING; LDL GOAL LESS THAN 160 07/18/2013    Family hx of colon cancer 07/18/2013    Family hx of melanoma 07/18/2013    Generalised anxiety disorder 07/18/2013    Heart palpitations     Mild major depression (H) 07/18/2013    Mild major depression (H24) 07/18/2013       Past Surgical History:   Procedure Laterality Date    APPENDECTOMY  2007    HERNIA REPAIR         Social History     Socioeconomic History    Marital status:      Spouse name: Not on file    Number of children: Not on file    Years of education: Not on file    Highest " education level: Not on file   Occupational History    Not on file   Tobacco Use    Smoking status: Never    Smokeless tobacco: Never   Vaping Use    Vaping status: Never Used   Substance and Sexual Activity    Alcohol use: Yes     Comment: About 2 bottles per week    Drug use: No    Sexual activity: Yes     Partners: Female   Other Topics Concern    Parent/sibling w/ CABG, MI or angioplasty before 65F 55M? No   Social History Narrative    Not on file     Social Determinants of Health     Financial Resource Strain: Low Risk  (2/6/2024)    Financial Resource Strain     Within the past 12 months, have you or your family members you live with been unable to get utilities (heat, electricity) when it was really needed?: No   Food Insecurity: Low Risk  (2/6/2024)    Food Insecurity     Within the past 12 months, did you worry that your food would run out before you got money to buy more?: No     Within the past 12 months, did the food you bought just not last and you didn t have money to get more?: No   Transportation Needs: Low Risk  (2/6/2024)    Transportation Needs     Within the past 12 months, has lack of transportation kept you from medical appointments, getting your medicines, non-medical meetings or appointments, work, or from getting things that you need?: No   Physical Activity: Insufficiently Active (2/6/2024)    Exercise Vital Sign     Days of Exercise per Week: 1 day     Minutes of Exercise per Session: 20 min   Stress: No Stress Concern Present (2/6/2024)    [place] of Occupational Health - Occupational Stress Questionnaire     Feeling of Stress : Only a little   Social Connections: Moderately Integrated (2/6/2024)    Social Connection and Isolation Panel [NHANES]     Frequency of Communication with Friends and Family: Twice a week     Frequency of Social Gatherings with Friends and Family: Once a week     Attends [employer] Services: 1 to 4 times per year     Active Member of Clubs or  Organizations: No     Attends Club or Organization Meetings: Not on file     Marital Status:    Interpersonal Safety: Low Risk  (2/13/2024)    Interpersonal Safety     Do you feel physically and emotionally safe where you currently live?: Yes     Within the past 12 months, have you been hit, slapped, kicked or otherwise physically hurt by someone?: No     Within the past 12 months, have you been humiliated or emotionally abused in other ways by your partner or ex-partner?: No   Housing Stability: Low Risk  (2/6/2024)    Housing Stability     Do you have housing? : Yes     Are you worried about losing your housing?: No       Family History   Problem Relation Age of Onset    Cancer Mother         melanoma    Prostate Cancer Father     Prostate Cancer Brother 45       Allergies   Allergen Reactions    Seasonal Allergies        Current Outpatient Medications   Medication Sig Dispense Refill    fluticasone (FLONASE) 50 MCG/ACT spray Spray 1-2 sprays into both nostrils daily (Patient taking differently: Spray 1-2 sprays into both nostrils as needed) 1 Bottle 0    metoprolol succinate ER (TOPROL XL) 25 MG 24 hr tablet Take 1 tablet (25 mg) by mouth daily At bedtime 90 tablet 4    sertraline (ZOLOFT) 25 MG tablet TAKE 1 AND 1/2 TABLETS BY MOUTH DAILY 135 tablet 3    tamsulosin (FLOMAX) 0.4 MG capsule Take 1 capsule (0.4 mg) by mouth daily 90 capsule 3     No current facility-administered medications for this visit.         PEx:   There were no vitals taken for this visit.    PSYCH: NAD  EYES: EOMI  MOUTH: MMM  NEURO: AAO x3    Urine:    A/P: Rickey Butt is a 48 year old male with Fhx prostate cancer, BPH w/ LUTS  UA, PVR with nurse in [facility] reviewed.  Can trial off Flomax  Cysto to eval POLANCO, poss UDS.     Neisha Hansen PA-C  [facility]      25 minutes spent on the date of the encounter doing chart review, review of outside records, review of test results, interpretation of tests, patient visit and  documentation

## 2024-05-15 NOTE — PROGRESS NOTES
"    Virtual Visit Details    Type of service:  Video Visit     Originating Location (pt. Location): Home    Distant Location (provider location):  On-site  Platform used for Video Visit: Mere  Start time: 1050am  End time 1100am    CC: LUTS, FHx prostate cancer    HPI:  Juan Butt is a pleasant 48 year old male who presents in follow-up of the above.     2/28/24: Brother dx with prostate cancer at 45 yrs. Bx noted \"something to monitor\". Recalls his PSA to be slightly elevated at that time.    Dad dx with prostate cancer at ~70 yrs. Had prostatectomy and doing well.     Has incontinence that is unpredictable (post void within the hour). Stream is usually strong, sometimes can dribble/be slow. Post void dribbling. Ongoing for ~1 year.     Has not had UA yet.     TODAY 5/15/24:  3 mo follow-up of Flomax, PVR 20cc, UA neg in interim  Brother with \"Adenocarcinoma of prostate, Dianna score 6    First wk good, then a couple wks of worsening symptoms. No profound improvement in LUTS, maybe worse at times.     Past Medical History:   Diagnosis Date    CARDIOVASCULAR SCREENING; LDL GOAL LESS THAN 160 07/18/2013    Family hx of colon cancer 07/18/2013    Family hx of melanoma 07/18/2013    Generalised anxiety disorder 07/18/2013    Heart palpitations     Mild major depression (H) 07/18/2013    Mild major depression (H24) 07/18/2013       Past Surgical History:   Procedure Laterality Date    APPENDECTOMY  2007    HERNIA REPAIR         Social History     Socioeconomic History    Marital status:      Spouse name: Not on file    Number of children: Not on file    Years of education: Not on file    Highest education level: Not on file   Occupational History    Not on file   Tobacco Use    Smoking status: Never    Smokeless tobacco: Never   Vaping Use    Vaping status: Never Used   Substance and Sexual Activity    Alcohol use: Yes     Comment: About 2 bottles per week    Drug use: No    Sexual activity: Yes     " Partners: Female   Other Topics Concern    Parent/sibling w/ CABG, MI or angioplasty before 65F 55M? No   Social History Narrative    Not on file     Social Determinants of Health     Financial Resource Strain: Low Risk  (2/6/2024)    Financial Resource Strain     Within the past 12 months, have you or your family members you live with been unable to get utilities (heat, electricity) when it was really needed?: No   Food Insecurity: Low Risk  (2/6/2024)    Food Insecurity     Within the past 12 months, did you worry that your food would run out before you got money to buy more?: No     Within the past 12 months, did the food you bought just not last and you didn t have money to get more?: No   Transportation Needs: Low Risk  (2/6/2024)    Transportation Needs     Within the past 12 months, has lack of transportation kept you from medical appointments, getting your medicines, non-medical meetings or appointments, work, or from getting things that you need?: No   Physical Activity: Insufficiently Active (2/6/2024)    Exercise Vital Sign     Days of Exercise per Week: 1 day     Minutes of Exercise per Session: 20 min   Stress: No Stress Concern Present (2/6/2024)    [employer] [place] [employer] - Occupational Stress Questionnaire     Feeling of Stress : Only a little   Social Connections: Moderately Integrated (2/6/2024)    Social Connection and Isolation Panel [NHANES]     Frequency of Communication with Friends and Family: Twice a week     Frequency of Social Gatherings with Friends and Family: Once a week     Attends Orthodox Services: 1 to 4 times per year     Active Member of Clubs or Organizations: No     Attends Club or Organization Meetings: Not on file     Marital Status:    Interpersonal Safety: Low Risk  (2/13/2024)    Interpersonal Safety     Do you feel physically and emotionally safe where you currently live?: Yes     Within the past 12 months, have you been hit, slapped, kicked or  otherwise physically hurt by someone?: No     Within the past 12 months, have you been humiliated or emotionally abused in other ways by your partner or ex-partner?: No   Housing Stability: Low Risk  (2/6/2024)    Housing Stability     Do you have housing? : Yes     Are you worried about losing your housing?: No       Family History   Problem Relation Age of Onset    Cancer Mother         melanoma    Prostate Cancer Father     Prostate Cancer Brother 45       Allergies   Allergen Reactions    Seasonal Allergies        Current Outpatient Medications   Medication Sig Dispense Refill    fluticasone (FLONASE) 50 MCG/ACT spray Spray 1-2 sprays into both nostrils daily (Patient taking differently: Spray 1-2 sprays into both nostrils as needed) 1 Bottle 0    metoprolol succinate ER (TOPROL XL) 25 MG 24 hr tablet Take 1 tablet (25 mg) by mouth daily At bedtime 90 tablet 4    sertraline (ZOLOFT) 25 MG tablet TAKE 1 AND 1/2 TABLETS BY MOUTH DAILY 135 tablet 3    tamsulosin (FLOMAX) 0.4 MG capsule Take 1 capsule (0.4 mg) by mouth daily 90 capsule 3     No current facility-administered medications for this visit.         PEx:   There were no vitals taken for this visit.    PSYCH: NAD  EYES: EOMI  MOUTH: MMM  NEURO: AAO x3    Urine:      A/P: Rickey Butt is a 48 year old male with Fhx prostate cancer, BPH w/ LUTS  UA, PVR with nurse in [facility] reviewed.  Can trial off Flomax  Cysto to eval POLANCO, poss UDS.       Neisha Hansen PA-C  [facility]        25 minutes spent on the date of the encounter doing chart review, review of outside records, review of test results, interpretation of tests, patient visit and documentation

## 2024-05-15 NOTE — NURSING NOTE
Is the patient currently in the state of [place]? YES    Visit mode:VIDEO    If the visit is dropped, the patient can be reconnected by: VIDEO VISIT: Text to cell phone:   Telephone Information:   Mobile [phone]       Will anyone else be joining the visit? NO  (If patient encounters technical issues they should call [phone]    How would you like to obtain your AVS? MyChart    Are changes needed to the allergy or medication list? No    Are refills needed on medications prescribed by this physician? NO    Reason for visit: RECHECK (2-3 month follow-up )    Milli LAZCANO

## 2024-05-16 ENCOUNTER — TELEPHONE (OUTPATIENT)
Dept: UROLOGY | Facility: CLINIC | Age: 49
End: 2024-05-16
Payer: COMMERCIAL

## 2024-05-16 NOTE — TELEPHONE ENCOUNTER
----- Message from Maritza Saeed sent at 5/16/2024  8:38 AM CDT -----  Regarding: Cysto  Cysto, no benefit with Flomax    MANDO  5/15/24

## 2024-07-09 ENCOUNTER — OFFICE VISIT (OUTPATIENT)
Dept: UROLOGY | Facility: CLINIC | Age: 49
End: 2024-07-09
Payer: COMMERCIAL

## 2024-07-09 ENCOUNTER — TELEPHONE (OUTPATIENT)
Dept: UROLOGY | Facility: CLINIC | Age: 49
End: 2024-07-09

## 2024-07-09 VITALS
WEIGHT: 210 LBS | DIASTOLIC BLOOD PRESSURE: 80 MMHG | SYSTOLIC BLOOD PRESSURE: 110 MMHG | BODY MASS INDEX: 26.95 KG/M2 | HEIGHT: 74 IN

## 2024-07-09 DIAGNOSIS — R39.9 LOWER URINARY TRACT SYMPTOMS (LUTS): Primary | ICD-10-CM

## 2024-07-09 DIAGNOSIS — N39.43 POST-VOID DRIBBLING: ICD-10-CM

## 2024-07-09 LAB
ALBUMIN UR-MCNC: NEGATIVE MG/DL
APPEARANCE UR: CLEAR
BILIRUB UR QL STRIP: NEGATIVE
COLOR UR AUTO: YELLOW
GLUCOSE UR STRIP-MCNC: NEGATIVE MG/DL
HGB UR QL STRIP: NEGATIVE
KETONES UR STRIP-MCNC: NEGATIVE MG/DL
LEUKOCYTE ESTERASE UR QL STRIP: NEGATIVE
NITRATE UR QL: NEGATIVE
PH UR STRIP: 7 [PH] (ref 5–7)
SP GR UR STRIP: 1.01 (ref 1–1.03)
UROBILINOGEN UR STRIP-ACNC: 1 E.U./DL

## 2024-07-09 PROCEDURE — 99214 OFFICE O/P EST MOD 30 MIN: CPT | Mod: 25 | Performed by: STUDENT IN AN ORGANIZED HEALTH CARE EDUCATION/TRAINING PROGRAM

## 2024-07-09 PROCEDURE — 52000 CYSTOURETHROSCOPY: CPT | Performed by: STUDENT IN AN ORGANIZED HEALTH CARE EDUCATION/TRAINING PROGRAM

## 2024-07-09 PROCEDURE — 81003 URINALYSIS AUTO W/O SCOPE: CPT | Mod: QW | Performed by: STUDENT IN AN ORGANIZED HEALTH CARE EDUCATION/TRAINING PROGRAM

## 2024-07-09 RX ORDER — ALFUZOSIN HYDROCHLORIDE 10 MG/1
10 TABLET, EXTENDED RELEASE ORAL DAILY
Qty: 30 TABLET | Refills: 3 | Status: SHIPPED | OUTPATIENT
Start: 2024-07-09

## 2024-07-09 RX ORDER — LIDOCAINE HYDROCHLORIDE 20 MG/ML
JELLY TOPICAL ONCE
Status: COMPLETED | OUTPATIENT
Start: 2024-07-09 | End: 2024-07-09

## 2024-07-09 RX ADMIN — LIDOCAINE HYDROCHLORIDE: 20 JELLY TOPICAL at 09:39

## 2024-07-09 ASSESSMENT — PAIN SCALES - GENERAL: PAINLEVEL: NO PAIN (0)

## 2024-07-09 NOTE — PATIENT INSTRUCTIONS

## 2024-07-09 NOTE — PROGRESS NOTES
"CHIEF COMPLAINT   Rickey Butt who is a 48 year old male returns today for follow-up of LUTS.      HPI   Rickey Butt is a 48 year old male who presents with a history of LUTS including post void dribbling, intermittently slow stream    After urinating he wets enough that the urine spot is visible through pants    Tamsulosin did not significantly improve the symptoms    He feels the stream is slower than when he was younger      PHYSICAL EXAM  Patient is a 48 year old  male   Vitals: Blood pressure 110/80, height 1.88 m (6' 2\"), weight 95.3 kg (210 lb).  Body mass index is 26.96 kg/m .  General Appearance Adult:   Alert, no acute distress, oriented  HENT: throat/mouth:normal, good dentition  Lungs: no respiratory distress, or pursed lip breathing  Heart: No obvious jugular venous distension present  Abdomen: nondistended  Musculoskeltal: extremities normal, no peripheral edema  Skin: no suspicious lesions or rashes  Neuro: Alert, oriented, speech and mentation normal  Psych: affect and mood normal  Gait: Normal  : circ phallus    PRE-PROCEDURE DIAGNOSIS: lower urinary tract symptoms    POST-PROCEDURE DIAGNOSIS: lower urinary tract symptoms    PROCEDURE: Cystoscopy    DESCRIPTION OF PROCEDURE: After informed consent was obtained, the patient was brought to the procedure room where he was placed in the supine position with all pressure points well padded.  The penis was prepped and draped in sterile fashion. A flexible cystoscope was introduced through a well-lubricated urethra.     Anterior urethra normal  Prostatic urethra about 2 cm and nonobstructive  Tight bladder neck but the scope easily passes it  Mild trabeculation without cellules or diverticuli    The flexible cystoscope was removed and the findings were described to the patient.       ASSESSMENT and PLAN  48 year old male who presents with a history of LUTS including post void dribbling, intermittently slow stream (chronic illness, " stable)    On cysto, nonobstructive prostate, no strictures however bladder neck seems tight. Possible primary bladder neck obstruction. If PFPT is not successful in relieving symptoms may consider transurethral incision of bladder neck. Tamsulosin not effective, can try alternative alpha blocker alfuzsoin    Start alfuzosin 10 mg daily rx drug therapy  Recommend pelvic floor physical therapy to help relax the pelvic floor  Get video urodynamics to assess for PBNO    Return to me after with UA, PVR, AUA symptom score (next 3-4 months)      Pepito Alonzo MD   [facility]  [facility] Phone: [phone]

## 2024-07-09 NOTE — TELEPHONE ENCOUNTER
----- Message from Thalia RICHMOND sent at 7/9/2024 10:08 AM CDT -----  Hi, Dr. Alonzo has referred pt for videourodynamics, can you please give him a call to schedule? Thanks!

## 2024-07-09 NOTE — LETTER
"7/9/2024       RE: Rickey Butt  [address]  [place] [facility] [address]     Dear Colleague,    Thank you for referring your patient, Rickey Butt, to the [facility]. Please see a copy of my visit note below.    CHIEF COMPLAINT   Rickey Butt who is a 48 year old male returns today for follow-up of LUTS.      HPI   Rickey Butt is a 48 year old male who presents with a history of LUTS including post void dribbling, intermittently slow stream    After urinating he wets enough that the urine spot is visible through pants    Tamsulosin did not significantly improve the symptoms    He feels the stream is slower than when he was younger      PHYSICAL EXAM  Patient is a 48 year old  male   Vitals: Blood pressure 110/80, height 1.88 m (6' 2\"), weight 95.3 kg (210 lb).  Body mass index is 26.96 kg/m .  General Appearance Adult:   Alert, no acute distress, oriented  HENT: throat/mouth:normal, good dentition  Lungs: no respiratory distress, or pursed lip breathing  Heart: No obvious jugular venous distension present  Abdomen: nondistended  Musculoskeltal: extremities normal, no peripheral edema  Skin: no suspicious lesions or rashes  Neuro: Alert, oriented, speech and mentation normal  Psych: affect and mood normal  Gait: Normal  : circ phallus    PRE-PROCEDURE DIAGNOSIS: lower urinary tract symptoms    POST-PROCEDURE DIAGNOSIS: lower urinary tract symptoms    PROCEDURE: Cystoscopy    DESCRIPTION OF PROCEDURE: After informed consent was obtained, the patient was brought to the procedure room where he was placed in the supine position with all pressure points well padded.  The penis was prepped and draped in sterile fashion. A flexible cystoscope was introduced through a well-lubricated urethra.     Anterior urethra normal  Prostatic urethra about 2 cm and nonobstructive  Tight bladder neck but the scope easily " passes it  Mild trabeculation without cellules or diverticuli    The flexible cystoscope was removed and the findings were described to the patient.       ASSESSMENT and PLAN  48 year old male who presents with a history of LUTS including post void dribbling, intermittently slow stream (chronic illness, stable)    On cysto, nonobstructive prostate, no strictures however bladder neck seems tight. Possible primary bladder neck obstruction. If PFPT is not successful in relieving symptoms may consider transurethral incision of bladder neck. Tamsulosin not effective, can try alternative alpha blocker alfuzsoin    Start alfuzosin 10 mg daily rx drug therapy  Recommend pelvic floor physical therapy to help relax the pelvic floor  Get video urodynamics to assess for PBNO    Return to me after with UA, PVR, AUA symptom score (next 3-4 months)      Pepito Alonzo MD   [facility]  [facility] Phone: [phone]

## 2024-07-09 NOTE — NURSING NOTE
Chief Complaint   Patient presents with    Benign Prostatic Hypertrophy     Cystoscopy     Prior to the start of the procedure and with procedural staff participation, I verbally confirmed the patient s identity using two indicators, relevant allergies, that the procedure was appropriate and matched the consent or emergent situation, and that the correct equipment/implants were available. Immediately prior to starting the procedure I conducted the Time Out with the procedural staff and re-confirmed the patient s name, procedure, and site/side. I have wiped the patient off with the povidone-Iodine solution, draped them, and used Lidocaine hydrochloride jelly. (The Joint Commission universal protocol was followed.)  Yes    Sedation (Moderate or Deep): None    5mL 2% lidocaine hydrochloride Urojet instilled into urethra.    NDC# [address]-7  Lot #: [id number]  Expiration Date:  6/25    Karoline Stearns EMT

## 2024-07-31 ENCOUNTER — THERAPY VISIT (OUTPATIENT)
Dept: PHYSICAL THERAPY | Facility: CLINIC | Age: 49
End: 2024-07-31
Attending: STUDENT IN AN ORGANIZED HEALTH CARE EDUCATION/TRAINING PROGRAM
Payer: COMMERCIAL

## 2024-07-31 DIAGNOSIS — R39.9 LOWER URINARY TRACT SYMPTOMS (LUTS): ICD-10-CM

## 2024-07-31 PROCEDURE — 97110 THERAPEUTIC EXERCISES: CPT | Mod: GP | Performed by: PHYSICAL THERAPIST

## 2024-07-31 PROCEDURE — 97161 PT EVAL LOW COMPLEX 20 MIN: CPT | Mod: GP | Performed by: PHYSICAL THERAPIST

## 2024-07-31 NOTE — PROGRESS NOTES
PHYSICAL THERAPY EVALUATION  Type of Visit: Evaluation     Fall Risk Screen:  Fall screen completed by: PT  Have you fallen 2 or more times in the past year?: No  Have you fallen and had an injury in the past year?: No  Is patient a fall risk?: No    Subjective  onset of LUTS one year ago. Pt referred for physical therapy on 24 for instruction in exercises to control urinary incontinence       Presenting condition or subjective complaint: leaking after peeing  Date of onset:      Relevant medical history: Depression; Heart problems; Incontinence   Dates & types of surgery: Appendix removed, Aug 2007; Hernia repair, ?    Prior diagnostic imaging/testing results:       Prior therapy history for the same diagnosis, illness or injury: No      Prior Level of Function  Transfers: Independent  Ambulation: Independent  ADL: Independent  IADL: Driving, Housekeeping, Work    Living Environment  Social support: With a significant other or spouse   Type of home: House   Stairs to enter the home: No       Ramp: No   Stairs inside the home: Yes 1 Is there a railing: Yes     Help at home: None  Equipment owned:       Employment: Yes   Hobbies/Interests: [employer], [employer], reading, cooking    Patient goals for therapy: not leak    Pain assessment: Pain present  Location: pelvic /Ratin/10     Objective      PELVIC EVALUATION  ADDITIONAL HISTORY:  Sex assigned at birth: Male  Gender identity: Male    Pronouns: They/Them/Theirs      Bladder History:  Feels bladder filling: Yes  Triggers for feeling of inability to wait to go to the bathroom: No    How long can you wait to urinate: long enough to not be a problem  Gets up at night to urinate: No    Can stop the flow of urine when urinating: Yes  Volume of urine usually released: Average   Other issues: Dribbling after urinating  Number of bladder infections in last 12 months:    Fluid intake per day: ~40oz ~16oz    Medications taken for bladder: No      Activities causing urine leak:      Amount of urine typically leaked: enough to make a dime sized spot on the front of my pants  Pads used to help with leaking: Yes I use this many pads per day: less than 1   I use this type/brand: depends      Bowel History:  Frequency of bowel movement: every other day-jama  Consistency of stool: Soft-formed    Ignores the urge to defecate:    Other bowel issues:    Length of time spent trying to have a bowel movement:      Sexual Function History:  Sexual orientation: Straight    Sexually active: Yes  Lubrication used: No No  Pelvic pain:      Pain or difficulty with orgasms/erection/ejaculation: No    State of menopause:    Hormone medications: No      Are you currently pregnant: No  Number of previous pregnancies: 0  Number of deliveries: 0  Have you been diagnosed with pelvic prolapse or abdominal separation: No  Do you get regular exercise: No  Have you tried pelvic floor strengthening exercises for 4 weeks: No  Do you have any history of trauma that is relevant to your care that you d like to share: No      Discussed reason for referral regarding pelvic health needs and external/internal pelvic floor muscle examination with patient/guardian.  Opportunity provided to ask questions and verbal consent for assessment and intervention was given.    PAIN: Pain Level at Rest: 0/10  Pain Level with Use: 0/10  POSTURE: WNL  LUMBAR SCREEN: AROM WNL  HIP SCREEN:  Strength: WNL   Functional Strength Testing:     PELVIC/SI SCREEN:  WNL   PAIN PROVOCATION TEST: WNL  PELVIS/SI SPECIAL TESTS: WNL  BREATHING SYMMETRY: WNL    PELVIC EXAM  External Visual Inspection:      Integumentary:       External Digital Palpation per Perineum:       Scar:   Location/Type:   Mobility:     Internal Digital Palpation:  Per Vagina:      Per Rectum:        Pelvic Organ Prolapse:       ABDOMINAL ASSESSMENT  Diastasis Rectus Abdominis (DALE):      Abdominal Activation/Strength:  WNL    Scar:   Location/Type:    Mobility:     Fascial Tension/Restriction:     BIOFEEDBACK:  Position:   Surface Electrodes:     Abdominals:     Perianals:       DERMATOMES:   DTR S:     Assessment & Plan   CLINICAL IMPRESSIONS  Medical Diagnosis: LUTS    Treatment Diagnosis: urinary incontinence. decreased strength   Impression/Assessment: Patient is a 48 year old male with pelvic  complaints.  The following significant findings have been identified: Decreased strength. These impairments interfere with their ability to perform self care tasks, work tasks, recreational activities, household chores, driving , household mobility, and community mobility as compared to previous level of function.     Clinical Decision Making (Complexity):  Clinical Presentation: Stable/Uncomplicated  Clinical Presentation Rationale: based on medical and personal factors listed in PT evaluation  Clinical Decision Making (Complexity): Low complexity    PLAN OF CARE  Treatment Interventions:  Interventions: Therapeutic Exercise    Long Term Goals     PT Goal 1  Goal Identifier: urinary incontinence  Goal Description: no leaking of urine after urination  Rationale: to maximize safety and independence with self cares  Target Date: 08/08/24      Frequency of Treatment: 1x/week  Duration of Treatment: 6 weeks    Recommended Referrals to Other Professionals:   Education Assessment:   Learner/Method: No Barriers to Learning    Risks and benefits of evaluation/treatment have been explained.   Patient/Family/caregiver agrees with Plan of Care.     Evaluation Time:     PT Eval, Low Complexity Minutes (88407): 20       Signing Clinician: Jorge Roberts PT

## 2024-08-07 ENCOUNTER — THERAPY VISIT (OUTPATIENT)
Dept: PHYSICAL THERAPY | Facility: CLINIC | Age: 49
End: 2024-08-07
Attending: STUDENT IN AN ORGANIZED HEALTH CARE EDUCATION/TRAINING PROGRAM
Payer: COMMERCIAL

## 2024-08-07 DIAGNOSIS — R39.9 LOWER URINARY TRACT SYMPTOMS (LUTS): Primary | ICD-10-CM

## 2024-08-07 PROCEDURE — 97110 THERAPEUTIC EXERCISES: CPT | Mod: GP | Performed by: PHYSICAL THERAPIST

## 2024-08-14 ENCOUNTER — THERAPY VISIT (OUTPATIENT)
Dept: PHYSICAL THERAPY | Facility: CLINIC | Age: 49
End: 2024-08-14
Attending: STUDENT IN AN ORGANIZED HEALTH CARE EDUCATION/TRAINING PROGRAM
Payer: COMMERCIAL

## 2024-08-14 DIAGNOSIS — R39.9 LOWER URINARY TRACT SYMPTOMS (LUTS): Primary | ICD-10-CM

## 2024-08-14 PROCEDURE — 97110 THERAPEUTIC EXERCISES: CPT | Mod: GP | Performed by: PHYSICAL THERAPIST

## 2024-08-21 ENCOUNTER — THERAPY VISIT (OUTPATIENT)
Dept: PHYSICAL THERAPY | Facility: CLINIC | Age: 49
End: 2024-08-21
Payer: COMMERCIAL

## 2024-08-21 DIAGNOSIS — R39.9 LOWER URINARY TRACT SYMPTOMS (LUTS): Primary | ICD-10-CM

## 2024-08-21 PROCEDURE — 97110 THERAPEUTIC EXERCISES: CPT | Mod: GP | Performed by: PHYSICAL THERAPIST

## 2024-08-28 ENCOUNTER — THERAPY VISIT (OUTPATIENT)
Dept: PHYSICAL THERAPY | Facility: CLINIC | Age: 49
End: 2024-08-28
Payer: COMMERCIAL

## 2024-08-28 DIAGNOSIS — R39.9 LOWER URINARY TRACT SYMPTOMS (LUTS): Primary | ICD-10-CM

## 2024-08-28 PROCEDURE — 97110 THERAPEUTIC EXERCISES: CPT | Mod: GP | Performed by: PHYSICAL THERAPIST

## 2024-09-03 ENCOUNTER — THERAPY VISIT (OUTPATIENT)
Dept: PHYSICAL THERAPY | Facility: CLINIC | Age: 49
End: 2024-09-03
Payer: COMMERCIAL

## 2024-09-03 DIAGNOSIS — R39.9 LOWER URINARY TRACT SYMPTOMS (LUTS): Primary | ICD-10-CM

## 2024-09-03 PROCEDURE — 97110 THERAPEUTIC EXERCISES: CPT | Mod: GP | Performed by: PHYSICAL THERAPIST

## 2024-09-03 NOTE — PROGRESS NOTES
"   09/03/24 0500   Appointment Info   Signing clinician's name / credentials mitzy paul pt   Total/Authorized Visits 6   Visits Used 5   Medical Diagnosis LUTS   PT Tx Diagnosis urinary incontinence. decreased strength   Other pertinent information \" jr \"   Progress Note/Certification   Therapy Frequency 1x/week   Predicted Duration 6 weeks   PT Goal 1   Goal Identifier urinary incontinence   Goal Description no leaking of urine after urination   Rationale to maximize safety and independence with self cares   Target Date 08/08/24   Subjective Report   Subjective Report pt reports improvement in symptoms. pt still notes incontinence with activities that put pressure on the bladder such as sitting and bending at the waist. pt denies pain and urgency   Objective Measures   Objective Measures Objective Measure 1   Objective Measure 1   Objective Measure pt demonstrated improved control of pelvic floor muscles in supine, sitting , standing positions. pt also demonstrated improved control with activity   Treatment Interventions (PT)   Interventions Therapeutic Procedure/Exercise   Therapeutic Procedure/Exercise   Therapeutic Procedures: strength, endurance, ROM, flexibility minutes (96386) 37   Therapeutic Procedures Ther Proc 2;Ther Proc 3;Ther Proc 4;Ther Proc 5;Ther Proc 6;Ther Proc 7;Ther Proc 8   Ther Proc 1 golfers lift with kegel 10 x   Ther Proc 1 - Details supine elevator with kegel 10 x 10 seconds   Ther Proc 2 standing quick flicks 2 sets 15 reps   Ther Proc 2 - Details sitting elevators 10 x 10 seconds   Ther Proc 3 standing elevators 10 x 10 seconds   Ther Proc 3 - Details push pull with therapist and kegel 10x   Ther Proc 4 knee bends with kegel 10 x   Ther Proc 4 - Details leg press with kegel 3 sets 10 reps 95# seat 3   Ther Proc 5 row with kegel 3 sets 10x blue  (3 sets)   Ther Proc 5 - Details lunge lift with kegel 10 x   Ther Proc 6 agility ladder with kegel   Ther Proc 6 - Details body blade with " juana 4 x 15 seconds  (unilat)   Skilled Intervention verbal cueing   Patient Response/Progress tolerated well   Ther Proc 7 discussion of elimination techniques.   Education   Learner/Method No Barriers to Learning   Plan   Home program see PTRX   Plan for next session pt to continue with HEP until MD visit         PLAN  Continue therapy per current plan of care.    Beginning/End Dates of Progress Note Reporting Period:    to 09/03/2024    Referring Provider:  Pepito Alonzo

## 2024-10-07 ENCOUNTER — PRE VISIT (OUTPATIENT)
Dept: UROLOGY | Facility: CLINIC | Age: 49
End: 2024-10-07
Payer: COMMERCIAL

## 2024-10-07 NOTE — TELEPHONE ENCOUNTER
Reason for visit: VUDS     Study scheduled because: LUTS     Relevant information: tight bladder neck but nonobstructive    Records/imaging/labs/orders: all records available    UA/UC ordered: li Flowers  10/7/2024  7:20 AM

## 2024-10-11 NOTE — PROGRESS NOTES
PREPROCEDURE DIAGNOSES:    1. Post-void dribbling  2. Intermittently slow stream     POSTPROCEDURE DIAGNOSES:  -Normal bladder capacity (575 mL) with normal filling sensations.  -Good bladder compliance without DO/DOI.  -Moderate detrusor contraction during voiding to max Pdet 17 cm H2O.  -Borderline-slow flow rate (Qmax 13 mL/s) with a bell-shaped flow curve and complete bladder emptying (final PVR 58 mL).  -Increased EMG activity throughout due to detachment of EMG patches.  -BOOI is -16.5 which is negative for bladder outlet obstruction.  -Fluoroscopy reveals a smooth bladder wall without diverticulae or cellules.  No vesicoureteral reflux was observed.  The bladder neck was closed during filling. As he was unable to void on the UDS chair, no voiding imaging available.    PROCEDURE:    -Sterile urethral catheterization for measurement of postvoid residual urine volume.  -Complex filling cystometrogram with measurement of bladder and rectal pressures.  -Complex voiding cystometrogram with measurement of bladder and rectal pressures.  -Electromyography of the pelvic floor during urodynamics.  -Fluoroscopic imaging of the bladder during urodynamics, at least 3 views.    -Interpretation of urodynamics and flouroscopic imaging.      INDICATIONS FOR PROCEDURE:  Mr. Rickey Butt is a pleasant 49 year old male who presents for video urodynamic assessment. VUDS is requested today by Dr. Alonzo to better characterize Mr. Rickey Butt's voiding dysfunction.      DESCRIPTION OF PROCEDURE:  Risks, benefits, and alternatives to urodynamics were discussed with the patient and he wished to proceed.  Urodynamics are planned to better assess the primary etiology for Mr. Butt's urologic dysfunction.  After informed consent was obtained, the patient was taken to the procedure room where the study was initiated. Findings below.     Pretest urine dipstick was negative for leukocytes and nitrites.    Next a 7F double-lumen  urodynamics catheter was inserted into the bladder under sterile technique via the urethra.  A 7F abdominal manometry catheter was placed in the rectum.  EMG pads were placed on both sides of the anal verge.  The bladder was filled with 100 mL of Omnipaque at 30 mL/minute and serial pressures were recorded.  With coughing there was an appropriate rise in vesical and abdominal pressures with no change in detrusor pressure, confirming good study catheter placement.    Due to the national IV fluid shortage, the bladder was then filled with sterile water using manual pressure through a 60 mL syringe.     DURING THE FILLING PHASE:  First sensation: 400 mL.  First Desire: 450 mL.  Strong Desire: 475 mL.  Maximum Capacity: 575 mL.    Uninhibited detrusor contractions: None.  Compliance: Good. PDet=0 cmH20 at capacity.  Continence: No incontinence.  EMG: Active throughout, due to detachment of EMG patches.     DURING THE VOIDING PHASE:  He attempts to void from a seated position but is unable. Is therefore moved to standing for a second attempt, which yielded the following data:     Maximum detrusor contraction with void: 17 cm of H2O pressure.  Voided volume: 517 mL.  Maximum flow rate: 13 mL/sec.  Average flow rate: 6.0 mL/sec.  Postvoid Residual: 58 mL.  EMG activity: Increased, due to detachment of EMG patches.  Character of voiding curve: Bell-shaped.  BOOI: -16.5 (suggesting no obstruction - see key below)  [obstructed (POLANCO index [BOOI] ? 40); equivocal (no definite   obstruction; BOOI 20-40); and no obstruction (BOOI ? 20)]    FLUOROSCOPIC IMAGING OF THE BLADDER DURING URODYNAMICS:  Please note, image numbers on UDS tracings correlate with iSite series numbers on PACS images. Fluoroscopy during today's procedure demonstrated a smooth bladder wall without diverticulae or cellules.  No vesicoureteral reflux was observed.  The bladder neck was closed during filling. As he was unable to void on the UDS chair, no voiding  imaging available.  At the completion of the study, all catheters were removed and the patient was brought back into the consultation room to further discuss today's study results.      ASSESSMENT/PLAN:  Mr. Rickey Butt is a pleasant 49 year old male who demonstrated the following findings today on urodynamic evaluation:    -Normal bladder capacity (575 mL) with normal filling sensations.  -Good bladder compliance without DO/DOI.  -Moderate detrusor contraction during voiding to max Pdet 17 cm H2O.  -Borderline-slow flow rate (Qmax 13 mL/s) with a bell-shaped flow curve and complete bladder emptying (final PVR 58 mL).  -Increased EMG activity throughout due to detachment of EMG patches.  -BOOI is -16.5 which is negative for bladder outlet obstruction.  -Fluoroscopy reveals a smooth bladder wall without diverticulae or cellules.  No vesicoureteral reflux was observed.  The bladder neck was closed during filling. As he was unable to void on the UDS chair, no voiding imaging available.    The patient will follow up as scheduled with Dr. Alonzo to further discuss today's study results and make plans for how best to proceed.      - A single Bactrim was provided for UTI prophylaxis following completion of today's study per department protocol.  The risk of UTI with VUDS is low at ~2.5-3%.      Thank you for allowing me to participate in the care of Mr. Rickey Butt and please don't hesitate to contact me with any questions or concerns.      This procedure was performed under a collaborative agreement with Dr. Micah Dobbs, Professor and  of Urology, [facility].    KIM Love, CNP  Department of Urology

## 2024-10-14 ENCOUNTER — ALLIED HEALTH/NURSE VISIT (OUTPATIENT)
Dept: UROLOGY | Facility: CLINIC | Age: 49
End: 2024-10-14
Payer: COMMERCIAL

## 2024-10-14 ENCOUNTER — ANCILLARY PROCEDURE (OUTPATIENT)
Dept: RADIOLOGY | Facility: AMBULATORY SURGERY CENTER | Age: 49
End: 2024-10-14
Attending: NURSE PRACTITIONER
Payer: COMMERCIAL

## 2024-10-14 VITALS — SYSTOLIC BLOOD PRESSURE: 124 MMHG | DIASTOLIC BLOOD PRESSURE: 84 MMHG | HEART RATE: 59 BPM

## 2024-10-14 DIAGNOSIS — N39.43 POST-VOID DRIBBLING: Primary | ICD-10-CM

## 2024-10-14 DIAGNOSIS — R39.9 LOWER URINARY TRACT SYMPTOMS (LUTS): ICD-10-CM

## 2024-10-14 LAB
ALBUMIN UR-MCNC: NEGATIVE MG/DL
APPEARANCE UR: CLEAR
BILIRUB UR QL STRIP: NEGATIVE
COLOR UR AUTO: YELLOW
GLUCOSE UR STRIP-MCNC: NEGATIVE MG/DL
HGB UR QL STRIP: NEGATIVE
KETONES UR STRIP-MCNC: NEGATIVE MG/DL
LEUKOCYTE ESTERASE UR QL STRIP: NEGATIVE
NITRATE UR QL: NEGATIVE
PH UR STRIP: 6.5 [PH] (ref 5–8)
SP GR UR STRIP: 1.01 (ref 1–1.03)
UROBILINOGEN UR STRIP-ACNC: 0.2 E.U./DL

## 2024-10-14 PROCEDURE — 51741 ELECTRO-UROFLOWMETRY FIRST: CPT | Performed by: NURSE PRACTITIONER

## 2024-10-14 PROCEDURE — 51728 CYSTOMETROGRAM W/VP: CPT | Performed by: NURSE PRACTITIONER

## 2024-10-14 PROCEDURE — 51797 INTRAABDOMINAL PRESSURE TEST: CPT | Performed by: NURSE PRACTITIONER

## 2024-10-14 PROCEDURE — 81003 URINALYSIS AUTO W/O SCOPE: CPT | Performed by: PATHOLOGY

## 2024-10-14 PROCEDURE — 51784 ANAL/URINARY MUSCLE STUDY: CPT | Performed by: NURSE PRACTITIONER

## 2024-10-14 PROCEDURE — 74430 CONTRAST X-RAY BLADDER: CPT | Performed by: NURSE PRACTITIONER

## 2024-10-14 PROCEDURE — 51600 INJECTION FOR BLADDER X-RAY: CPT | Performed by: NURSE PRACTITIONER

## 2024-10-14 RX ORDER — SULFAMETHOXAZOLE AND TRIMETHOPRIM 800; 160 MG/1; MG/1
1 TABLET ORAL ONCE
Status: COMPLETED | OUTPATIENT
Start: 2024-10-14 | End: 2024-10-14

## 2024-10-14 RX ADMIN — SULFAMETHOXAZOLE AND TRIMETHOPRIM 1 TABLET: 800; 160 TABLET ORAL at 08:33

## 2024-10-14 ASSESSMENT — PAIN SCALES - GENERAL: PAINLEVEL: NO PAIN (0)

## 2024-10-14 NOTE — NURSING NOTE
.  Chief Complaint   Patient presents with    Urodynamics Study       There were no vitals taken for this visit. There is no height or weight on file to calculate BMI.    Patient Active Problem List   Diagnosis    CARDIOVASCULAR SCREENING; LDL GOAL LESS THAN 160    Family hx of colon cancer    Family hx of melanoma    Mild major depression (H)    Generalized anxiety disorder    Seasonal allergic rhinitis    Heart palpitations    Paroxysmal ventricular tachycardia (H)    Recurrent major depressive disorder, in full remission (H)    Family history of prostate cancer    Urinary hesitancy    Lower urinary tract symptoms (LUTS)       Allergies   Allergen Reactions    Seasonal Allergies        Current Outpatient Medications   Medication Sig Dispense Refill    alfuzosin ER (UROXATRAL) 10 MG 24 hr tablet Take 1 tablet (10 mg) by mouth daily 30 tablet 3    fluticasone (FLONASE) 50 MCG/ACT spray Spray 1-2 sprays into both nostrils daily (Patient taking differently: Spray 1-2 sprays into both nostrils as needed) 1 Bottle 0    metoprolol succinate ER (TOPROL XL) 25 MG 24 hr tablet Take 1 tablet (25 mg) by mouth daily At bedtime 90 tablet 4    sertraline (ZOLOFT) 25 MG tablet TAKE 1 AND 1/2 TABLETS BY MOUTH DAILY 135 tablet 3    tamsulosin (FLOMAX) 0.4 MG capsule Take 1 capsule (0.4 mg) by mouth daily 90 capsule 3       Social History     Tobacco Use    Smoking status: Never    Smokeless tobacco: Never   Vaping Use    Vaping status: Never Used   Substance Use Topics    Alcohol use: Yes     Comment: About 2 bottles per week    Drug use: No       Invasive Procedure Safety Checklist:    Procedure: Urodynamics    Action: Complete sections and checkboxes as appropriate.  Pre-procedure:  1. Patient ID Verified with 2 identifiers (Lisa and  or MRN) : YES    2. Procedure and site verified with patient/designee (when able) : YES    3. Accurate consent documentation in medical record : YES    4. H&P (or appropriate assessment)  documented in medical record : N/A  H&P must be up to 30 days prior to procedure an updated within 24 hours of Procedure as applicable.     5. Relevant diagnostic and radiology test results appropriately labeled and displayed as applicable : YES    6. Blood products, implants, devices, and/or special equipment available for the procedure as applicable : YES    7. Procedure site(s) marked with provider initials [Exclusions: none] : NO    8. Marking not required. Reason : Yes  Procedure does not require site marking    Time Out:     Time-Out performed immediately prior to starting procedure, including verbal and active participation of all team members addressing: YES    1. Correct patient identity.  2. Confirmed that the correct side and site are marked.  3. An accurate procedure to be done.  4. Agreement on the procedure to be done.  5. Correct patient position.  6. Relevant images and results are properly labeled and appropriately displayed.  7. The need to administer antibiotics or fluids for irrigation purposes during the procedure as applicable.  8. Safety precautions based on patient history or medication use.    During Procedure: Verification of correct person, site, and procedure occurs any time the responsibility for care of the patient is transferred to another member of the care team.      The following medication was given:     MEDICATION: Bactrim (Trimethoprim / Sulfamethoxazole)  ROUTE: PO  SITE: Medication was given orally  DOSE: 800mg/160mg  LOT #: [id number]  : [employer]  EXPIRATION DATE: 2026-01-31  NDC#: [id number]   Was there drug waste? No    Prior to medication administration, verified patient identity using patient's name and date of birth.  Due to medication administration, patient instructed to remain in clinic for 15 minutes  afterwards, and to report any adverse reaction to me immediately.   Prior to administration, verified patient identity using patient's name and date of  birth.  Due to administration, patient instructed to remain in clinic for 15 minutes  afterwards, and to report any adverse reaction to me immediately.    Drug Amount Wasted:  None.  Vial/Syringe: Single dose vial    The following medication was given:     MEDICATION:  Omnipaque (Iohexol Injection) (240mgI/mL)  ROUTE: Provider Administered  SITE: Provider Administered via catheter  DOSE: 100mL  LOT #: [id number]  : [employer]  EXPIRATION DATE: 23 APR 2026  NDC#: 2215-5913-52   Was there drug waste? No    Prior to injection, verified patient identity using patient's name and date of birth.  Due to injection administration, patient instructed to remain in clinic for 15 minutes  afterwards, and to report any adverse reaction to me immediately.    Drug Amount Wasted:  None.  Vial/Syringe: Single dose vial      The following medication was given: See Above    Patient did tolerate procedure well.    Patient instructed as to where to call or go for pain, fever, leakage, or decreased urine flow.     This service provided today was under the direct supervision of Beata Pittman CNP, who was available if needed.    Terra Flowers  10/14/2024  7:01 AM

## 2024-10-14 NOTE — PATIENT INSTRUCTIONS
UROLOGY CLINIC VISIT PATIENT INSTRUCTIONS    -Follow up with Dr. Alonzo, as scheduled, to review.     If you have any issues, questions or concerns in the meantime, do not hesitate to contact us at [phone] or via [employer].     Beata Pittman, CNP  Department of Urology

## 2024-10-24 ENCOUNTER — OFFICE VISIT (OUTPATIENT)
Dept: UROLOGY | Facility: CLINIC | Age: 49
End: 2024-10-24
Payer: COMMERCIAL

## 2024-10-24 VITALS
HEIGHT: 74 IN | WEIGHT: 210 LBS | DIASTOLIC BLOOD PRESSURE: 78 MMHG | BODY MASS INDEX: 26.95 KG/M2 | SYSTOLIC BLOOD PRESSURE: 128 MMHG

## 2024-10-24 DIAGNOSIS — Z80.42 FAMILY HISTORY OF PROSTATE CANCER: ICD-10-CM

## 2024-10-24 DIAGNOSIS — R39.9 LOWER URINARY TRACT SYMPTOMS (LUTS): Primary | ICD-10-CM

## 2024-10-24 LAB
ALBUMIN UR-MCNC: NEGATIVE MG/DL
APPEARANCE UR: CLEAR
BILIRUB UR QL STRIP: NEGATIVE
COLOR UR AUTO: YELLOW
GLUCOSE UR STRIP-MCNC: NEGATIVE MG/DL
HGB UR QL STRIP: NEGATIVE
KETONES UR STRIP-MCNC: NEGATIVE MG/DL
LEUKOCYTE ESTERASE UR QL STRIP: NEGATIVE
NITRATE UR QL: NEGATIVE
PH UR STRIP: 7 [PH] (ref 5–7)
RESIDUAL VOLUME (RV) (EXTERNAL): 32
SP GR UR STRIP: 1.02 (ref 1–1.03)
UROBILINOGEN UR STRIP-ACNC: 1 E.U./DL

## 2024-10-24 PROCEDURE — 81003 URINALYSIS AUTO W/O SCOPE: CPT | Mod: QW | Performed by: STUDENT IN AN ORGANIZED HEALTH CARE EDUCATION/TRAINING PROGRAM

## 2024-10-24 PROCEDURE — 99213 OFFICE O/P EST LOW 20 MIN: CPT | Mod: 25 | Performed by: STUDENT IN AN ORGANIZED HEALTH CARE EDUCATION/TRAINING PROGRAM

## 2024-10-24 PROCEDURE — 51798 US URINE CAPACITY MEASURE: CPT | Performed by: STUDENT IN AN ORGANIZED HEALTH CARE EDUCATION/TRAINING PROGRAM

## 2024-10-24 ASSESSMENT — PAIN SCALES - GENERAL: PAINLEVEL_OUTOF10: NO PAIN (0)

## 2024-10-24 NOTE — LETTER
"10/24/2024       RE: Rickey Butt  [address]  [facility] [address]     Dear Colleague,    Thank you for referring your patient, Rickey Butt, to the [facility] [place] [facility]. Please see a copy of my visit note below.    CHIEF COMPLAINT   Rickey Butt who is a 49 year old male returns today for follow-up of LUTS including post void dribbling, intermittently slow stream     HPI   Rickey Butt is a 49 year old male who presents with a history of LUTS including post void dribbling, intermittently slow stream     Started on alfuzosin at last visit after tamsulosin was not effective. He stopped because didn't seem to be helping much    AUA symptom score 1-4-2-1-1-0-0 = 5 qol mostly satisfied    He says the pelvic floor physical therapy does seem to be helping more than the alpha blockers    -Normal bladder capacity (575 mL) with normal filling sensations.  -Good bladder compliance without DO/DOI.  -Moderate detrusor contraction during voiding to max Pdet 17 cm H2O.  -Borderline-slow flow rate (Qmax 13 mL/s) with a bell-shaped flow curve and complete bladder emptying (final PVR 58 mL).  -Increased EMG activity throughout due to detachment of EMG patches.  -BOOI is -16.5 which is negative for bladder outlet obstruction.  -Fluoroscopy reveals a smooth bladder wall without diverticulae or cellules.  No vesicoureteral reflux was observed.  The bladder neck was closed during filling. As he was unable to void on the UDS chair, no voiding imaging available.    PHYSICAL EXAM  Patient is a 49 year old  male   Vitals: Blood pressure 128/78, height 1.88 m (6' 2\"), weight 95.3 kg (210 lb).  Body mass index is 26.96 kg/m .  General Appearance Adult:   Alert, no acute distress, oriented  HENT: throat/mouth:normal, good dentition  Lungs: no respiratory distress, or pursed lip breathing  Heart: No obvious jugular venous distension " present  Abdomen: nondistended  Musculoskeltal: extremities normal, no peripheral edema  Skin: no suspicious lesions or rashes  Neuro: Alert, oriented, speech and mentation normal  Psych: affect and mood normal  Gait: Normal  : deferred    Component      Latest Ref Rng 10/24/2024  9:18 AM   Color Urine      Colorless, Straw, Light Yellow, Yellow  Yellow    Appearance Urine      Clear  Clear    Glucose Urine      Negative mg/dL Negative    Bilirubin Urine      Negative  Negative    Ketones Urine      Negative mg/dL Negative    Specific Gravity Urine      1.003 - 1.035  1.020    Blood Urine      Negative  Negative    pH Urine      5.0 - 7.0  7.0    Protein Albumin Urine      Negative mg/dL Negative    Urobilinogen Urine      0.2, 1.0 E.U./dL 1.0    Nitrite Urine      Negative  Negative    Leukocyte Esterase Urine      Negative  Negative      Pvr 32 ml      ASSESSMENT and PLAN  49 year old male who presents with a history of LUTS including post void dribbling, intermittently slow stream. UDS shows a hypocontractile bladder without evidence of bladder outlet obstruction. Agree with stopping alpha blockers. He said PFPT helped the most which is concordant with the increased EMG activity seen on UDS. He continues to empty bladder to near completion albeit slowly (low PVR 32 ml today). At this point no further intervention aside from continued PFPT is recommended. If his urinary symptoms worsen and he goes into retention, would need to consider intermittent self catheterization vs. Referral for possible sacral neuromodulation    Patient will continue to monitor his symptoms and return to urology if worsening symptoms  He should continue to have his PSA checked annually due to his FH of prostate cancer and return to urology if PSA has concerning rise    Pepito Alonzo MD   [facility]  [facility] Phone: [phone]      Again, thank you for allowing me to participate in the care of your  patient.      Sincerely,    Pepito Alonzo MD

## 2024-10-24 NOTE — NURSING NOTE
Chief Complaint   Patient presents with    Benign Prostatic Hypertrophy     PVR: 32 mL by bladder scan    Karoline Stearns, Clinic Assistant

## 2024-10-24 NOTE — PROGRESS NOTES
"CHIEF COMPLAINT   Rickey Butt who is a 49 year old male returns today for follow-up of LUTS including post void dribbling, intermittently slow stream     HPI   Rickey Butt is a 49 year old male who presents with a history of LUTS including post void dribbling, intermittently slow stream     Started on alfuzosin at last visit after tamsulosin was not effective. He stopped because didn't seem to be helping much    AUA symptom score 2-5-5-1-1-0-0 = 5 qol mostly satisfied    He says the pelvic floor physical therapy does seem to be helping more than the alpha blockers    -Normal bladder capacity (575 mL) with normal filling sensations.  -Good bladder compliance without DO/DOI.  -Moderate detrusor contraction during voiding to max Pdet 17 cm H2O.  -Borderline-slow flow rate (Qmax 13 mL/s) with a bell-shaped flow curve and complete bladder emptying (final PVR 58 mL).  -Increased EMG activity throughout due to detachment of EMG patches.  -BOOI is -16.5 which is negative for bladder outlet obstruction.  -Fluoroscopy reveals a smooth bladder wall without diverticulae or cellules.  No vesicoureteral reflux was observed.  The bladder neck was closed during filling. As he was unable to void on the UDS chair, no voiding imaging available.    PHYSICAL EXAM  Patient is a 49 year old  male   Vitals: Blood pressure 128/78, height 1.88 m (6' 2\"), weight 95.3 kg (210 lb).  Body mass index is 26.96 kg/m .  General Appearance Adult:   Alert, no acute distress, oriented  HENT: throat/mouth:normal, good dentition  Lungs: no respiratory distress, or pursed lip breathing  Heart: No obvious jugular venous distension present  Abdomen: nondistended  Musculoskeltal: extremities normal, no peripheral edema  Skin: no suspicious lesions or rashes  Neuro: Alert, oriented, speech and mentation normal  Psych: affect and mood normal  Gait: Normal  : deferred    Component      Latest Ref Rng 10/24/2024  9:18 AM   Color Urine      " Colorless, Straw, Light Yellow, Yellow  Yellow    Appearance Urine      Clear  Clear    Glucose Urine      Negative mg/dL Negative    Bilirubin Urine      Negative  Negative    Ketones Urine      Negative mg/dL Negative    Specific Gravity Urine      1.003 - 1.035  1.020    Blood Urine      Negative  Negative    pH Urine      5.0 - 7.0  7.0    Protein Albumin Urine      Negative mg/dL Negative    Urobilinogen Urine      0.2, 1.0 E.U./dL 1.0    Nitrite Urine      Negative  Negative    Leukocyte Esterase Urine      Negative  Negative      Pvr 32 ml      ASSESSMENT and PLAN  49 year old male who presents with a history of LUTS including post void dribbling, intermittently slow stream. UDS shows a hypocontractile bladder without evidence of bladder outlet obstruction. Agree with stopping alpha blockers. He said PFPT helped the most which is concordant with the increased EMG activity seen on UDS. He continues to empty bladder to near completion albeit slowly (low PVR 32 ml today). At this point no further intervention aside from continued PFPT is recommended. If his urinary symptoms worsen and he goes into retention, would need to consider intermittent self catheterization vs. Referral for possible sacral neuromodulation    Patient will continue to monitor his symptoms and return to urology if worsening symptoms  He should continue to have his PSA checked annually due to his FH of prostate cancer and return to urology if PSA has concerning rise    Pepito Alonzo MD   [facility]  [facility] Phone: [phone]

## 2025-02-10 SDOH — HEALTH STABILITY: PHYSICAL HEALTH: ON AVERAGE, HOW MANY MINUTES DO YOU ENGAGE IN EXERCISE AT THIS LEVEL?: 20 MIN

## 2025-02-10 SDOH — HEALTH STABILITY: PHYSICAL HEALTH: ON AVERAGE, HOW MANY DAYS PER WEEK DO YOU ENGAGE IN MODERATE TO STRENUOUS EXERCISE (LIKE A BRISK WALK)?: 1 DAY

## 2025-02-10 ASSESSMENT — ANXIETY QUESTIONNAIRES
6. BECOMING EASILY ANNOYED OR IRRITABLE: NOT AT ALL
GAD7 TOTAL SCORE: 3
7. FEELING AFRAID AS IF SOMETHING AWFUL MIGHT HAPPEN: SEVERAL DAYS
1. FEELING NERVOUS, ANXIOUS, OR ON EDGE: NOT AT ALL
7. FEELING AFRAID AS IF SOMETHING AWFUL MIGHT HAPPEN: SEVERAL DAYS
3. WORRYING TOO MUCH ABOUT DIFFERENT THINGS: SEVERAL DAYS
8. IF YOU CHECKED OFF ANY PROBLEMS, HOW DIFFICULT HAVE THESE MADE IT FOR YOU TO DO YOUR WORK, TAKE CARE OF THINGS AT HOME, OR GET ALONG WITH OTHER PEOPLE?: NOT DIFFICULT AT ALL
2. NOT BEING ABLE TO STOP OR CONTROL WORRYING: SEVERAL DAYS
GAD7 TOTAL SCORE: 3
GAD7 TOTAL SCORE: 3
IF YOU CHECKED OFF ANY PROBLEMS ON THIS QUESTIONNAIRE, HOW DIFFICULT HAVE THESE PROBLEMS MADE IT FOR YOU TO DO YOUR WORK, TAKE CARE OF THINGS AT HOME, OR GET ALONG WITH OTHER PEOPLE: NOT DIFFICULT AT ALL
5. BEING SO RESTLESS THAT IT IS HARD TO SIT STILL: NOT AT ALL
4. TROUBLE RELAXING: NOT AT ALL

## 2025-02-10 ASSESSMENT — SOCIAL DETERMINANTS OF HEALTH (SDOH): HOW OFTEN DO YOU GET TOGETHER WITH FRIENDS OR RELATIVES?: ONCE A WEEK

## 2025-02-12 ASSESSMENT — PATIENT HEALTH QUESTIONNAIRE - PHQ9
10. IF YOU CHECKED OFF ANY PROBLEMS, HOW DIFFICULT HAVE THESE PROBLEMS MADE IT FOR YOU TO DO YOUR WORK, TAKE CARE OF THINGS AT HOME, OR GET ALONG WITH OTHER PEOPLE: NOT DIFFICULT AT ALL
SUM OF ALL RESPONSES TO PHQ QUESTIONS 1-9: 1
SUM OF ALL RESPONSES TO PHQ QUESTIONS 1-9: 1

## 2025-02-13 ENCOUNTER — OFFICE VISIT (OUTPATIENT)
Dept: FAMILY MEDICINE | Facility: CLINIC | Age: 50
End: 2025-02-13
Attending: PHYSICIAN ASSISTANT
Payer: COMMERCIAL

## 2025-02-13 VITALS
HEART RATE: 65 BPM | OXYGEN SATURATION: 98 % | TEMPERATURE: 98.5 F | DIASTOLIC BLOOD PRESSURE: 74 MMHG | RESPIRATION RATE: 16 BRPM | HEIGHT: 74 IN | SYSTOLIC BLOOD PRESSURE: 118 MMHG | WEIGHT: 224 LBS | BODY MASS INDEX: 28.75 KG/M2

## 2025-02-13 DIAGNOSIS — F41.1 GENERALIZED ANXIETY DISORDER: ICD-10-CM

## 2025-02-13 DIAGNOSIS — Z12.5 SCREENING FOR PROSTATE CANCER: ICD-10-CM

## 2025-02-13 DIAGNOSIS — F33.42 RECURRENT MAJOR DEPRESSIVE DISORDER, IN FULL REMISSION: ICD-10-CM

## 2025-02-13 DIAGNOSIS — Z13.220 LIPID SCREENING: ICD-10-CM

## 2025-02-13 DIAGNOSIS — Z80.42 FAMILY HISTORY OF PROSTATE CANCER: ICD-10-CM

## 2025-02-13 DIAGNOSIS — I49.3 PVC'S (PREMATURE VENTRICULAR CONTRACTIONS): ICD-10-CM

## 2025-02-13 DIAGNOSIS — Z00.00 ROUTINE GENERAL MEDICAL EXAMINATION AT A HEALTH CARE FACILITY: Primary | ICD-10-CM

## 2025-02-13 DIAGNOSIS — J30.1 SEASONAL ALLERGIC RHINITIS DUE TO POLLEN: ICD-10-CM

## 2025-02-13 DIAGNOSIS — I47.29 PAROXYSMAL VENTRICULAR TACHYCARDIA (H): ICD-10-CM

## 2025-02-13 LAB
ALBUMIN SERPL BCG-MCNC: 4.5 G/DL (ref 3.5–5.2)
ALP SERPL-CCNC: 48 U/L (ref 40–150)
ALT SERPL W P-5'-P-CCNC: 37 U/L (ref 0–70)
ANION GAP SERPL CALCULATED.3IONS-SCNC: 13 MMOL/L (ref 7–15)
AST SERPL W P-5'-P-CCNC: 30 U/L (ref 0–45)
BILIRUB SERPL-MCNC: 0.7 MG/DL
BUN SERPL-MCNC: 10.1 MG/DL (ref 6–20)
CALCIUM SERPL-MCNC: 9.3 MG/DL (ref 8.8–10.4)
CHLORIDE SERPL-SCNC: 108 MMOL/L (ref 98–107)
CHOLEST SERPL-MCNC: 166 MG/DL
CREAT SERPL-MCNC: 0.91 MG/DL (ref 0.67–1.17)
EGFRCR SERPLBLD CKD-EPI 2021: >90 ML/MIN/1.73M2
FASTING STATUS PATIENT QL REPORTED: NO
FASTING STATUS PATIENT QL REPORTED: NO
GLUCOSE SERPL-MCNC: 88 MG/DL (ref 70–99)
HCO3 SERPL-SCNC: 23 MMOL/L (ref 22–29)
HDLC SERPL-MCNC: 37 MG/DL
LDLC SERPL CALC-MCNC: 86 MG/DL
NONHDLC SERPL-MCNC: 129 MG/DL
POTASSIUM SERPL-SCNC: 4.1 MMOL/L (ref 3.4–5.3)
PROT SERPL-MCNC: 6.7 G/DL (ref 6.4–8.3)
PSA SERPL DL<=0.01 NG/ML-MCNC: 1.14 NG/ML (ref 0–2.5)
SODIUM SERPL-SCNC: 144 MMOL/L (ref 135–145)
TRIGL SERPL-MCNC: 214 MG/DL

## 2025-02-13 RX ORDER — FLUTICASONE PROPIONATE 50 MCG
1-2 SPRAY, SUSPENSION (ML) NASAL DAILY
Qty: 48 G | Refills: 3 | Status: SHIPPED | OUTPATIENT
Start: 2025-02-13

## 2025-02-13 RX ORDER — METOPROLOL SUCCINATE 25 MG/1
25 TABLET, EXTENDED RELEASE ORAL DAILY
Qty: 90 TABLET | Refills: 3 | Status: SHIPPED | OUTPATIENT
Start: 2025-02-13

## 2025-02-13 RX ORDER — SERTRALINE HYDROCHLORIDE 25 MG/1
TABLET, FILM COATED ORAL
Qty: 135 TABLET | Refills: 3 | Status: SHIPPED | OUTPATIENT
Start: 2025-02-13

## 2025-02-13 NOTE — PROGRESS NOTES
"Preventive Care Visit  [facility]  KIM Mendoza CNP, Family Medicine  Feb 13, 2025      Assessment & Plan     (Z00.00) Routine general medical examination at a health care facility  (primary encounter diagnosis)  Comment: Reviewed health maintenance/screening services guidelines/recommendations as well as vaccination recommendations as indicated which Juan understands. Services ordered after shared decision making agreed upon. Recommended healthy habits/diet and exercise.    Plan: Lipid panel reflex to direct LDL Fasting,         Comprehensive metabolic panel (BMP + Alb, Alk         Phos, ALT, AST, Total. Bili, TP)    (I49.3) PVC's (premature ventricular contractions)  (I47.29) Paroxysmal ventricular tachycardia (H)  Comment: Chronic stable medical condition; continue present management on current treatment regimen. Rarely feels palpitations. Refill provided.  Plan: metoprolol succinate ER (TOPROL XL) 25 MG 24 hr        tablet    (F33.42) Recurrent major depressive disorder, in full remission  (F41.1) Generalized anxiety disorder  Comment: Chronic stable medical condition; continue present management on current treatment regimen; refill provided.   Plan: sertraline (ZOLOFT) 25 MG tablet    (J30.1) Seasonal allergic rhinitis due to pollen  Comment: Chronic stable medical condition; continue present management on current treatment regimen; refill provided.   Plan: fluticasone (FLONASE) 50 MCG/ACT nasal spray    (Z12.5) Screening for prostate cancer  (Z80.42) Family history of prostate cancer  Plan: PSA, screen    (Z13.220) Lipid screening  Plan: Lipid panel reflex to direct LDL Fasting    BMI  Estimated body mass index is 28.76 kg/m  as calculated from the following:    Height as of this encounter: 1.88 m (6' 2\").    Weight as of this encounter: 101.6 kg (224 lb).   Weight management plan: Discussed healthy diet and exercise guidelines    Counseling  Appropriate preventive services " were addressed with this patient via screening, questionnaire, or discussion as appropriate for fall prevention, nutrition, physical activity, Tobacco-use cessation, social engagement, weight loss and cognition.  Checklist reviewing preventive services available has been given to the patient.  Reviewed patient's diet, addressing concerns and/or questions.   He is at risk for lack of exercise and has been provided with information to increase physical activity for the benefit of his well-being.       Benito Martinez is a 49 year old, presenting for the following:  Physical        2/13/2025     8:25 AM   Additional Questions   Roomed by Thu HOFF  Patient presents to the clinic today for an annual physical exam and to follow-up on his chronic medical conditions.          2/10/2025   General Health   How would you rate your overall physical health? Good   Feel stress (tense, anxious, or unable to sleep) Only a little   (!) STRESS CONCERN      2/10/2025   Nutrition   Three or more servings of calcium each day? Yes   Diet: Other   If other, please elaborate: Lactose Intolerant   How many servings of fruit and vegetables per day? (!) 2-3   How many sweetened beverages each day? 0-1         2/10/2025   Exercise   Days per week of moderate/strenous exercise 1 day   Average minutes spent exercising at this level 20 min   (!) EXERCISE CONCERN      2/10/2025   Social Factors   Frequency of gathering with friends or relatives Once a week   Worry food won't last until get money to buy more No   Food not last or not have enough money for food? No   Do you have housing? (Housing is defined as stable permanent housing and does not include staying ouside in a car, in a tent, in an abandoned building, in an overnight shelter, or couch-surfing.) Yes   Are you worried about losing your housing? No   Lack of transportation? No   Unable to get utilities (heat,electricity)? No         2/10/2025   Dental   Dentist two times  "every year? Yes         2/6/2024   TB Screening   Were you born outside of the [place]? No       Today's PHQ-9 Score:       2/12/2025     6:59 PM   PHQ-9 SCORE   PHQ-9 Total Score MyChart 1 (Minimal depression)   PHQ-9 Total Score 1        Patient-reported         2/10/2025   Substance Use   Alcohol more than 3/day or more than 7/wk No   Do you use any other substances recreationally? No     Social History     Tobacco Use    Smoking status: Never    Smokeless tobacco: Never   Vaping Use    Vaping status: Never Used   Substance Use Topics    Alcohol use: Yes     Comment: About 2 bottles per week    Drug use: No           2/10/2025   STI Screening   New sexual partner(s) since last STI/HIV test? No   ASCVD Risk   The 10-year ASCVD risk score (Dea ANN, et al., 2019) is: 2.5%    Values used to calculate the score:      Age: 49 years      Sex: Male      Is Non- : No      Diabetic: No      Tobacco smoker: No      Systolic Blood Pressure: 118 mmHg      Is BP treated: No      HDL Cholesterol: 37 mg/dL      Total Cholesterol: 164 mg/dL        2/10/2025   Contraception/Family Planning   Questions about contraception or family planning No        Reviewed and updated as needed this visit by Provider   Tobacco  Allergies  Meds  Problems  Med Hx  Surg Hx  Fam Hx            Objective    Exam  /74 (BP Location: Right arm, Patient Position: Chair, Cuff Size: Adult Large)   Pulse 65   Temp 98.5  F (36.9  C) (Oral)   Resp 16   Ht 1.88 m (6' 2\")   Wt 101.6 kg (224 lb)   SpO2 98%   BMI 28.76 kg/m     Estimated body mass index is 28.76 kg/m  as calculated from the following:    Height as of this encounter: 1.88 m (6' 2\").    Weight as of this encounter: 101.6 kg (224 lb).    Physical Exam  GENERAL: alert and no distress  EYES: Eyes grossly normal to inspection, PERRL and conjunctivae and sclerae normal  HENT: ear canals and TM's normal, nose and mouth without ulcers or lesions  NECK: no " adenopathy, no asymmetry, masses, or scars  RESP: lungs clear to auscultation - no rales, rhonchi or wheezes  CV: regular rate and rhythm, normal S1 S2, no S3 or S4, no murmur, click or rub, no peripheral edema  ABDOMEN: soft, nontender, no hepatosplenomegaly, no masses and bowel sounds normal  MS: no gross musculoskeletal defects noted, no edema  SKIN: no suspicious lesions or rashes  NEURO: Normal strength and tone, mentation intact and speech normal  PSYCH: mentation appears normal, affect normal/bright        Signed Electronically by: KIM Mendoza CNP    Answers submitted by the patient for this visit:  Patient Health Questionnaire (Submitted on 2/12/2025)  If you checked off any problems, how difficult have these problems made it for you to do your work, take care of things at home, or get along with other people?: Not difficult at all  PHQ9 TOTAL SCORE: 1  Patient Health Questionnaire (G7) (Submitted on 2/10/2025)  JENIFER 7 TOTAL SCORE: 3

## 2025-02-13 NOTE — PATIENT INSTRUCTIONS
Patient Education   Preventive Care Advice   This is general advice given by our system to help you stay healthy. However, your care team may have specific advice just for you. Please talk to your care team about your preventive care needs.  Nutrition  Eat 5 or more servings of fruits and vegetables each day.  Try wheat bread, brown rice and whole grain pasta (instead of white bread, rice, and pasta).  Get enough calcium and vitamin D. Check the label on foods and aim for 100% of the RDA (recommended daily allowance).  Lifestyle  Exercise at least 150 minutes each week  (30 minutes a day, 5 days a week).  Do muscle strengthening activities 2 days a week. These help control your weight and prevent disease.  No smoking.  Wear sunscreen to prevent skin cancer.  Have a dental exam and cleaning every 6 months.  Yearly exams  See your health care team every year to talk about:  Any changes in your health.  Any medicines your care team has prescribed.  Preventive care, family planning, and ways to prevent chronic diseases.  Shots (vaccines)   HPV shots (up to age 26), if you've never had them before.  Hepatitis B shots (up to age 59), if you've never had them before.  COVID-19 shot: Get this shot when it's due.  Flu shot: Get a flu shot every year.  Tetanus shot: Get a tetanus shot every 10 years.  Pneumococcal, hepatitis A, and RSV shots: Ask your care team if you need these based on your risk.  Shingles shot (for age 50 and up)  General health tests  Diabetes screening:  Starting at age 35, Get screened for diabetes at least every 3 years.  If you are younger than age 35, ask your care team if you should be screened for diabetes.  Cholesterol test: At age 39, start having a cholesterol test every 5 years, or more often if advised.  Bone density scan (DEXA): At age 50, ask your care team if you should have this scan for osteoporosis (brittle bones).  Hepatitis C: Get tested at least once in your life.  STIs (sexually  transmitted infections)  Before age 24: Ask your care team if you should be screened for STIs.  After age 24: Get screened for STIs if you're at risk. You are at risk for STIs (including HIV) if:  You are sexually active with more than one person.  You don't use condoms every time.  You or a partner was diagnosed with a sexually transmitted infection.  If you are at risk for HIV, ask about PrEP medicine to prevent HIV.  Get tested for HIV at least once in your life, whether you are at risk for HIV or not.  Cancer screening tests  Cervical cancer screening: If you have a cervix, begin getting regular cervical cancer screening tests starting at age 21.  Breast cancer scan (mammogram): If you've ever had breasts, begin having regular mammograms starting at age 40. This is a scan to check for breast cancer.  Colon cancer screening: It is important to start screening for colon cancer at age 45.  Have a colonoscopy test every 10 years (or more often if you're at risk) Or, ask your provider about stool tests like a FIT test every year or Cologuard test every 3 years.  To learn more about your testing options, visit:   .  For help making a decision, visit:   https://bit.ly/yf96838.  Prostate cancer screening test: If you have a prostate, ask your care team if a prostate cancer screening test (PSA) at age 55 is right for you.  Lung cancer screening: If you are a current or former smoker ages 50 to 80, ask your care team if ongoing lung cancer screenings are right for you.  For informational purposes only. Not to replace the advice of your health care provider. Copyright   2023 [employer]. All rights reserved. Clinically reviewed by the [facility] Transitions Program. [employer] [id number] - REV 01/24.

## 2025-03-03 ENCOUNTER — LAB (OUTPATIENT)
Dept: LAB | Facility: CLINIC | Age: 50
End: 2025-03-03
Attending: FAMILY MEDICINE
Payer: COMMERCIAL

## 2025-03-03 ENCOUNTER — E-VISIT (OUTPATIENT)
Dept: URGENT CARE | Facility: CLINIC | Age: 50
End: 2025-03-03
Payer: COMMERCIAL

## 2025-03-03 DIAGNOSIS — R05.9 COUGH, UNSPECIFIED TYPE: Primary | ICD-10-CM

## 2025-03-03 DIAGNOSIS — R05.9 COUGH, UNSPECIFIED TYPE: ICD-10-CM

## 2025-03-03 LAB
FLUAV AG SPEC QL IA: NEGATIVE
FLUBV AG SPEC QL IA: NEGATIVE
SARS-COV-2 RNA RESP QL NAA+PROBE: NEGATIVE

## 2025-03-03 PROCEDURE — 87804 INFLUENZA ASSAY W/OPTIC: CPT

## 2025-03-03 PROCEDURE — 87635 SARS-COV-2 COVID-19 AMP PRB: CPT

## 2025-03-03 NOTE — PATIENT INSTRUCTIONS
Dear Juan,    After reviewing your responses, I would like you to come in for a swab to make sure we treat you correctly. This swab is to evaluate you for possible COVID and Flu, and should be scheduled for today or tomorrow. Please use the Schedule Now button in [employer] to schedule your swab. Otherwise, click this link to schedule a lab only appointment.    Lab appointments are not available at most locations on the weekends. If no Lab Only appointment is available, you should be seen in any of our convenient Urgent Care Centers for an in person visit, which can be found on our website here.    You will receive instructions with your results in [employer] once they are available.     If your symptoms worsen, you develop difficulty breathing, difficulty with drinking enough to stay hydrated, or fevers for more than 5 days, please contact your primary care provider for an appointment or visit an Urgent Care Center to be seen.      Thanks again for choosing us as your health care partner.   Trista Moreau MD

## 2025-04-15 ENCOUNTER — LAB (OUTPATIENT)
Dept: LAB | Facility: CLINIC | Age: 50
End: 2025-04-15
Payer: COMMERCIAL

## 2025-04-15 DIAGNOSIS — I47.20 PAROXYSMAL VENTRICULAR TACHYCARDIA (H): ICD-10-CM

## 2025-04-15 DIAGNOSIS — I49.3 PVC'S (PREMATURE VENTRICULAR CONTRACTIONS): ICD-10-CM

## 2025-04-15 DIAGNOSIS — E78.6 HDL DEFICIENCY: ICD-10-CM

## 2025-04-15 DIAGNOSIS — R00.2 HEART PALPITATIONS: ICD-10-CM

## 2025-04-15 DIAGNOSIS — E78.1 HYPERTRIGLYCERIDEMIA: ICD-10-CM

## 2025-04-15 LAB
ANION GAP SERPL CALCULATED.3IONS-SCNC: 11 MMOL/L (ref 7–15)
BUN SERPL-MCNC: 12.5 MG/DL (ref 6–20)
CALCIUM SERPL-MCNC: 9.2 MG/DL (ref 8.8–10.4)
CHLORIDE SERPL-SCNC: 111 MMOL/L (ref 98–107)
CHOLEST SERPL-MCNC: 156 MG/DL
CREAT SERPL-MCNC: 0.97 MG/DL (ref 0.67–1.17)
EGFRCR SERPLBLD CKD-EPI 2021: >90 ML/MIN/1.73M2
FASTING STATUS PATIENT QL REPORTED: YES
GLUCOSE SERPL-MCNC: 95 MG/DL (ref 70–99)
HCO3 SERPL-SCNC: 22 MMOL/L (ref 22–29)
HDLC SERPL-MCNC: 33 MG/DL
LDLC SERPL CALC-MCNC: 92 MG/DL
NONHDLC SERPL-MCNC: 123 MG/DL
POTASSIUM SERPL-SCNC: 4.1 MMOL/L (ref 3.4–5.3)
SODIUM SERPL-SCNC: 144 MMOL/L (ref 135–145)
TRIGL SERPL-MCNC: 154 MG/DL

## 2025-04-17 ENCOUNTER — OFFICE VISIT (OUTPATIENT)
Dept: CARDIOLOGY | Facility: CLINIC | Age: 50
End: 2025-04-17
Payer: COMMERCIAL

## 2025-04-17 VITALS
BODY MASS INDEX: 27.94 KG/M2 | DIASTOLIC BLOOD PRESSURE: 74 MMHG | WEIGHT: 217.7 LBS | HEART RATE: 72 BPM | SYSTOLIC BLOOD PRESSURE: 114 MMHG | OXYGEN SATURATION: 96 % | HEIGHT: 74 IN

## 2025-04-17 DIAGNOSIS — I47.20 PAROXYSMAL VENTRICULAR TACHYCARDIA (H): Primary | ICD-10-CM

## 2025-04-17 DIAGNOSIS — E78.6 HDL DEFICIENCY: ICD-10-CM

## 2025-04-17 DIAGNOSIS — I49.3 PVC'S (PREMATURE VENTRICULAR CONTRACTIONS): ICD-10-CM

## 2025-04-17 DIAGNOSIS — E78.1 HYPERTRIGLYCERIDEMIA: ICD-10-CM

## 2025-04-17 DIAGNOSIS — R00.2 HEART PALPITATIONS: ICD-10-CM

## 2025-04-17 NOTE — LETTER
4/17/2025    [facility]  [address]  [facility] [address]    RE: Rickey WENDIE Butt       Dear Colleague,     I had the pleasure of seeing Rickey Butt in the [facility].  HPI and Plan:   It is my pleasure to see your patient Juan Butt who is a very pleasant 49-year-old patient who was complained of palpitations which were subsequently found to be due to primary treatment ventricular contractions.  He did in the past also have a short episodes of paroxysmal ventricular tachycardia and ischemic workup was negative at that time.  He also tends to have HDL deficiency and mild hypertriglyceridemia.  With that background in mind he has been doing well since I last saw him.  His palpitations are infrequent.  They occur maybe once every other month.  The Toprol has been successful at suppressing these.  His LDL was 92, he has HDL deficiency at 33 and borderline hypertriglyceridemia at 154 with a total cholesterol of 156.  He is mildly overweight with a BMI of 28 and weighs 217 pounds.  Twelve-lead electrocardiogram which was performed today in which I reviewed personally today is normal.    Impression:  1.  Palpitations.  These are infrequent now occurring only once every other month.  Metoprolol has been successful at suppressing these.  2.  Mixed hyperlipidemia with normal LDL and HDL deficiency and mild hypertriglyceridemia possibly related to weight and lack of exercise.    Plan:  1.  I have advised the patient to try and exercise more and to lose weight which will tend to raise his HDL and lower triglycerides.  2.  We will continue the metoprolol as this has been successful as suppressing his palpitations to a greater extent.  3.  I will see the patient back again in 1 years time with a repeat lipid profile and twelve-lead electrocardiogram.  He is to contact us if he notices any worsening of his palpitations.    The longitudinal plan of care for the  diagnosis(es)/condition(s) as documented were addressed during this visit. Due to the added complexity in care, I will continue to support Juan in the subsequent management and with ongoing continuity of care.    Ángel Amaro MD, FAC, FRCPI    Orders Placed This Encounter   Procedures     Lipid Profile     Follow-Up with Cardiology     EKG 12-lead complete w/read - Clinics (to be scheduled)       No orders of the defined types were placed in this encounter.      There are no discontinued medications.      Encounter Diagnoses   Name Primary?     PVC's (premature ventricular contractions)      Paroxysmal ventricular tachycardia (H) Yes     Heart palpitations      Hypertriglyceridemia      HDL deficiency        CURRENT MEDICATIONS:  Current Outpatient Medications   Medication Sig Dispense Refill     fluticasone (FLONASE) 50 MCG/ACT nasal spray Spray 1-2 sprays into both nostrils daily. 48 g 3     metoprolol succinate ER (TOPROL XL) 25 MG 24 hr tablet Take 1 tablet (25 mg) by mouth daily. At bedtime 90 tablet 3     sertraline (ZOLOFT) 25 MG tablet TAKE 1 AND 1/2 TABLETS BY MOUTH DAILY 135 tablet 3       ALLERGIES     Allergies   Allergen Reactions     Seasonal Allergies        PAST MEDICAL HISTORY:  Past Medical History:   Diagnosis Date     CARDIOVASCULAR SCREENING; LDL GOAL LESS THAN 160 07/18/2013     Family hx of colon cancer 07/18/2013     Family hx of melanoma 07/18/2013     Generalised anxiety disorder 07/18/2013     Heart palpitations      Mild major depression 07/18/2013     Mild major depression (H) 07/18/2013       PAST SURGICAL HISTORY:  Past Surgical History:   Procedure Laterality Date     APPENDECTOMY  01/01/2007     CYSTOSCOPY       HERNIA REPAIR         FAMILY HISTORY:  Family History   Problem Relation Age of Onset     Cancer Mother         melanoma     Prostate Cancer Father      Prostate Cancer Brother 45       SOCIAL HISTORY:  Social History     Socioeconomic History     Marital status:       Spouse name: None     Number of children: None     Years of education: None     Highest education level: None   Tobacco Use     Smoking status: Never     Smokeless tobacco: Never   Vaping Use     Vaping status: Never Used   Substance and Sexual Activity     Alcohol use: Yes     Comment: About 1 beer per week     Drug use: No     Sexual activity: Yes     Partners: Female   Other Topics Concern     Parent/sibling w/ CABG, MI or angioplasty before 65F 55M? No     Social Drivers of Health     Financial Resource Strain: Low Risk  (2/10/2025)    Financial Resource Strain      Within the past 12 months, have you or your family members you live with been unable to get utilities (heat, electricity) when it was really needed?: No   Food Insecurity: Low Risk  (2/10/2025)    Food Insecurity      Within the past 12 months, did you worry that your food would run out before you got money to buy more?: No      Within the past 12 months, did the food you bought just not last and you didn t have money to get more?: No   Transportation Needs: Low Risk  (2/10/2025)    Transportation Needs      Within the past 12 months, has lack of transportation kept you from medical appointments, getting your medicines, non-medical meetings or appointments, work, or from getting things that you need?: No   Physical Activity: Insufficiently Active (2/10/2025)    Exercise Vital Sign      Days of Exercise per Week: 1 day      Minutes of Exercise per Session: 20 min   Stress: No Stress Concern Present (2/10/2025)    [employer] - Occupational Stress Questionnaire      Feeling of Stress : Only a little   Social Connections: Unknown (2/10/2025)    Social Connection and Isolation Panel [NHANES]      Frequency of Social Gatherings with Friends and Family: Once a week   Interpersonal Safety: Low Risk  (2/13/2025)    Interpersonal Safety      Do you feel physically and emotionally safe where you currently live?: Yes     "  Within the past 12 months, have you been hit, slapped, kicked or otherwise physically hurt by someone?: No      Within the past 12 months, have you been humiliated or emotionally abused in other ways by your partner or ex-partner?: No   Housing Stability: Low Risk  (2/10/2025)    Housing Stability      Do you have housing? : Yes      Are you worried about losing your housing?: No       Review of Systems:  Skin:          Eyes:         ENT:         Respiratory:  Negative       Cardiovascular:    Positive for, palpitations    Gastroenterology:        Genitourinary:         Musculoskeletal:         Neurologic:         Psychiatric:         Heme/Lymph/Imm:         Endocrine:           Physical Exam:  Vitals: /74 (BP Location: Right arm, Patient Position: Sitting, Cuff Size: Adult Large)   Pulse 72   Ht 1.88 m (6' 2\")   Wt 98.7 kg (217 lb 11.2 oz)   SpO2 96%   BMI 27.95 kg/m      Constitutional:  cooperative, alert and oriented, well developed, well nourished, in no acute distress overweight      Skin:  warm and dry to the touch, no apparent skin lesions or masses noted          Head:  normocephalic, no masses or lesions        Eyes:  pupils equal and round, conjunctivae and lids unremarkable, sclera white, no xanthalasma, EOMS intact, no nystagmus        Lymph:      ENT:  no pallor or cyanosis        Neck:  carotid pulses are full and equal bilaterally, JVP normal, no carotid bruit        Respiratory:  normal breath sounds, clear to auscultation, normal A-P diameter, normal symmetry, normal respiratory excursion, no use of accessory muscles         Cardiac: regular rhythm, normal S1 and S2, no S3 or S4, apical impulse not displaced, no murmurs, gallops or rubs detected                pulses full and equal                                        GI:  not assessed this visit        Extremities and Muscular Skeletal:  no deformities, clubbing, cyanosis, erythema observed, no edema              Neurological:  no " gross motor deficits, affect appropriate        Psych:  Alert and Oriented x 3        CC  Ángel Amaro MD  [address]  [place] [id number]                  Thank you for allowing me to participate in the care of your patient.      Sincerely,     Ángel Amaro MD, MD     [facility]  cc:   Ángel Amaro MD  [address]  [place] [id number]

## 2025-04-17 NOTE — PROGRESS NOTES
HPI and Plan:   It is my pleasure to see your patient Juan Butt who is a very pleasant 49-year-old patient who was complained of palpitations which were subsequently found to be due to primary treatment ventricular contractions.  He did in the past also have a short episodes of paroxysmal ventricular tachycardia and ischemic workup was negative at that time.  He also tends to have HDL deficiency and mild hypertriglyceridemia.  With that background in mind he has been doing well since I last saw him.  His palpitations are infrequent.  They occur maybe once every other month.  The Toprol has been successful at suppressing these.  His LDL was 92, he has HDL deficiency at 33 and borderline hypertriglyceridemia at 154 with a total cholesterol of 156.  He is mildly overweight with a BMI of 28 and weighs 217 pounds.  Twelve-lead electrocardiogram which was performed today in which I reviewed personally today is normal.    Impression:  1.  Palpitations.  These are infrequent now occurring only once every other month.  Metoprolol has been successful at suppressing these.  2.  Mixed hyperlipidemia with normal LDL and HDL deficiency and mild hypertriglyceridemia possibly related to weight and lack of exercise.    Plan:  1.  I have advised the patient to try and exercise more and to lose weight which will tend to raise his HDL and lower triglycerides.  2.  We will continue the metoprolol as this has been successful as suppressing his palpitations to a greater extent.  3.  I will see the patient back again in 1 years time with a repeat lipid profile and twelve-lead electrocardiogram.  He is to contact us if he notices any worsening of his palpitations.    The longitudinal plan of care for the diagnosis(es)/condition(s) as documented were addressed during this visit. Due to the added complexity in care, I will continue to support Juan in the subsequent management and with ongoing continuity of care.    Ángel Amaro MD,  FACC, FRCPI    Orders Placed This Encounter   Procedures    Lipid Profile    Follow-Up with Cardiology    EKG 12-lead complete w/read - Clinics (to be scheduled)       No orders of the defined types were placed in this encounter.      There are no discontinued medications.      Encounter Diagnoses   Name Primary?    PVC's (premature ventricular contractions)     Paroxysmal ventricular tachycardia (H) Yes    Heart palpitations     Hypertriglyceridemia     HDL deficiency        CURRENT MEDICATIONS:  Current Outpatient Medications   Medication Sig Dispense Refill    fluticasone (FLONASE) 50 MCG/ACT nasal spray Spray 1-2 sprays into both nostrils daily. 48 g 3    metoprolol succinate ER (TOPROL XL) 25 MG 24 hr tablet Take 1 tablet (25 mg) by mouth daily. At bedtime 90 tablet 3    sertraline (ZOLOFT) 25 MG tablet TAKE 1 AND 1/2 TABLETS BY MOUTH DAILY 135 tablet 3       ALLERGIES     Allergies   Allergen Reactions    Seasonal Allergies        PAST MEDICAL HISTORY:  Past Medical History:   Diagnosis Date    CARDIOVASCULAR SCREENING; LDL GOAL LESS THAN 160 07/18/2013    Family hx of colon cancer 07/18/2013    Family hx of melanoma 07/18/2013    Generalised anxiety disorder 07/18/2013    Heart palpitations     Mild major depression 07/18/2013    Mild major depression (H) 07/18/2013       PAST SURGICAL HISTORY:  Past Surgical History:   Procedure Laterality Date    APPENDECTOMY  01/01/2007    CYSTOSCOPY      HERNIA REPAIR         FAMILY HISTORY:  Family History   Problem Relation Age of Onset    Cancer Mother         melanoma    Prostate Cancer Father     Prostate Cancer Brother 45       SOCIAL HISTORY:  Social History     Socioeconomic History    Marital status:      Spouse name: None    Number of children: None    Years of education: None    Highest education level: None   Tobacco Use    Smoking status: Never    Smokeless tobacco: Never   Vaping Use    Vaping status: Never Used   Substance and Sexual Activity     Alcohol use: Yes     Comment: About 1 beer per week    Drug use: No    Sexual activity: Yes     Partners: Female   Other Topics Concern    Parent/sibling w/ CABG, MI or angioplasty before 65F 55M? No     Social Drivers of Health     Financial Resource Strain: Low Risk  (2/10/2025)    Financial Resource Strain     Within the past 12 months, have you or your family members you live with been unable to get utilities (heat, electricity) when it was really needed?: No   Food Insecurity: Low Risk  (2/10/2025)    Food Insecurity     Within the past 12 months, did you worry that your food would run out before you got money to buy more?: No     Within the past 12 months, did the food you bought just not last and you didn t have money to get more?: No   Transportation Needs: Low Risk  (2/10/2025)    Transportation Needs     Within the past 12 months, has lack of transportation kept you from medical appointments, getting your medicines, non-medical meetings or appointments, work, or from getting things that you need?: No   Physical Activity: Insufficiently Active (2/10/2025)    Exercise Vital Sign     Days of Exercise per Week: 1 day     Minutes of Exercise per Session: 20 min   Stress: No Stress Concern Present (2/10/2025)    [employer] - Occupational Stress Questionnaire     Feeling of Stress : Only a little   Social Connections: Unknown (2/10/2025)    Social Connection and Isolation Panel [NHANES]     Frequency of Social Gatherings with Friends and Family: Once a week   Interpersonal Safety: Low Risk  (2/13/2025)    Interpersonal Safety     Do you feel physically and emotionally safe where you currently live?: Yes     Within the past 12 months, have you been hit, slapped, kicked or otherwise physically hurt by someone?: No     Within the past 12 months, have you been humiliated or emotionally abused in other ways by your partner or ex-partner?: No   Housing Stability: Low Risk  (2/10/2025)     "Housing Stability     Do you have housing? : Yes     Are you worried about losing your housing?: No       Review of Systems:  Skin:          Eyes:         ENT:         Respiratory:  Negative       Cardiovascular:    Positive for, palpitations    Gastroenterology:        Genitourinary:         Musculoskeletal:         Neurologic:         Psychiatric:         Heme/Lymph/Imm:         Endocrine:           Physical Exam:  Vitals: /74 (BP Location: Right arm, Patient Position: Sitting, Cuff Size: Adult Large)   Pulse 72   Ht 1.88 m (6' 2\")   Wt 98.7 kg (217 lb 11.2 oz)   SpO2 96%   BMI 27.95 kg/m      Constitutional:  cooperative, alert and oriented, well developed, well nourished, in no acute distress overweight      Skin:  warm and dry to the touch, no apparent skin lesions or masses noted          Head:  normocephalic, no masses or lesions        Eyes:  pupils equal and round, conjunctivae and lids unremarkable, sclera white, no xanthalasma, EOMS intact, no nystagmus        Lymph:      ENT:  no pallor or cyanosis        Neck:  carotid pulses are full and equal bilaterally, JVP normal, no carotid bruit        Respiratory:  normal breath sounds, clear to auscultation, normal A-P diameter, normal symmetry, normal respiratory excursion, no use of accessory muscles         Cardiac: regular rhythm, normal S1 and S2, no S3 or S4, apical impulse not displaced, no murmurs, gallops or rubs detected                pulses full and equal                                        GI:  not assessed this visit        Extremities and Muscular Skeletal:  no deformities, clubbing, cyanosis, erythema observed, no edema              Neurological:  no gross motor deficits, affect appropriate        Psych:  Alert and Oriented x 3        CC  Ángel Amaro MD  [id number]  [place] [id number]                "

## (undated) RX ORDER — SULFAMETHOXAZOLE AND TRIMETHOPRIM 800; 160 MG/1; MG/1
TABLET ORAL
Status: DISPENSED
Start: 2024-10-14